# Patient Record
Sex: FEMALE | Race: WHITE | NOT HISPANIC OR LATINO | Employment: OTHER | URBAN - METROPOLITAN AREA
[De-identification: names, ages, dates, MRNs, and addresses within clinical notes are randomized per-mention and may not be internally consistent; named-entity substitution may affect disease eponyms.]

---

## 2017-01-16 ENCOUNTER — APPOINTMENT (OUTPATIENT)
Dept: ADMISSIONS | Facility: MEDICAL CENTER | Age: 62
End: 2017-01-16
Payer: COMMERCIAL

## 2017-01-18 ENCOUNTER — APPOINTMENT (OUTPATIENT)
Dept: ADMISSIONS | Facility: MEDICAL CENTER | Age: 62
End: 2017-01-18
Attending: OPHTHALMOLOGY
Payer: COMMERCIAL

## 2017-01-19 ENCOUNTER — HOSPITAL ENCOUNTER (OUTPATIENT)
Dept: RADIOLOGY | Facility: MEDICAL CENTER | Age: 62
End: 2017-01-19
Attending: NURSE PRACTITIONER
Payer: COMMERCIAL

## 2017-01-19 DIAGNOSIS — Z13.9 SCREENING: ICD-10-CM

## 2017-01-19 PROCEDURE — G0202 SCR MAMMO BI INCL CAD: HCPCS

## 2017-01-24 ENCOUNTER — HOSPITAL ENCOUNTER (OUTPATIENT)
Facility: MEDICAL CENTER | Age: 62
End: 2017-01-24
Attending: OPHTHALMOLOGY | Admitting: OPHTHALMOLOGY
Payer: COMMERCIAL

## 2017-01-24 VITALS
BODY MASS INDEX: 36.21 KG/M2 | OXYGEN SATURATION: 93 % | RESPIRATION RATE: 16 BRPM | SYSTOLIC BLOOD PRESSURE: 137 MMHG | WEIGHT: 204.37 LBS | TEMPERATURE: 97.1 F | HEIGHT: 63 IN | DIASTOLIC BLOOD PRESSURE: 72 MMHG

## 2017-01-24 PROBLEM — H25.12 NUCLEAR SCLEROTIC CATARACT OF LEFT EYE: Status: ACTIVE | Noted: 2017-01-24

## 2017-01-24 PROCEDURE — 700101 HCHG RX REV CODE 250

## 2017-01-24 PROCEDURE — 160002 HCHG RECOVERY MINUTES (STAT): Performed by: OPHTHALMOLOGY

## 2017-01-24 PROCEDURE — 501749 HCHG SHELL REV 276: Performed by: OPHTHALMOLOGY

## 2017-01-24 PROCEDURE — 160029 HCHG SURGERY MINUTES - 1ST 30 MINS LEVEL 4: Performed by: OPHTHALMOLOGY

## 2017-01-24 PROCEDURE — 700111 HCHG RX REV CODE 636 W/ 250 OVERRIDE (IP): Performed by: ANESTHESIOLOGY

## 2017-01-24 PROCEDURE — 700111 HCHG RX REV CODE 636 W/ 250 OVERRIDE (IP)

## 2017-01-24 PROCEDURE — 160009 HCHG ANES TIME/MIN: Performed by: OPHTHALMOLOGY

## 2017-01-24 PROCEDURE — 501539 HCHG TIP, PHACO: Performed by: OPHTHALMOLOGY

## 2017-01-24 PROCEDURE — 160048 HCHG OR STATISTICAL LEVEL 1-5: Performed by: OPHTHALMOLOGY

## 2017-01-24 PROCEDURE — 160035 HCHG PACU - 1ST 60 MINS PHASE I: Performed by: OPHTHALMOLOGY

## 2017-01-24 DEVICE — IMPLANTABLE DEVICE: Type: IMPLANTABLE DEVICE | Status: FUNCTIONAL

## 2017-01-24 RX ORDER — KETOROLAC TROMETHAMINE 5 MG/ML
SOLUTION OPHTHALMIC
Status: DISCONTINUED | OUTPATIENT
Start: 2017-01-24 | End: 2017-01-24 | Stop reason: HOSPADM

## 2017-01-24 RX ORDER — PHENYLEPHRINE HYDROCHLORIDE 25 MG/ML
SOLUTION/ DROPS OPHTHALMIC
Status: COMPLETED
Start: 2017-01-24 | End: 2017-01-24

## 2017-01-24 RX ORDER — SODIUM CHLORIDE, SODIUM LACTATE, POTASSIUM CHLORIDE, CALCIUM CHLORIDE 600; 310; 30; 20 MG/100ML; MG/100ML; MG/100ML; MG/100ML
INJECTION, SOLUTION INTRAVENOUS CONTINUOUS
Status: DISCONTINUED | OUTPATIENT
Start: 2017-01-24 | End: 2017-01-24 | Stop reason: HOSPADM

## 2017-01-24 RX ORDER — TETRACAINE HYDROCHLORIDE 5 MG/ML
SOLUTION OPHTHALMIC
Status: DISCONTINUED | OUTPATIENT
Start: 2017-01-24 | End: 2017-01-24 | Stop reason: HOSPADM

## 2017-01-24 RX ORDER — CYCLOPENTOLATE HYDROCHLORIDE 10 MG/ML
SOLUTION/ DROPS OPHTHALMIC
Status: COMPLETED
Start: 2017-01-24 | End: 2017-01-24

## 2017-01-24 RX ORDER — MOXIFLOXACIN 5 MG/ML
SOLUTION/ DROPS OPHTHALMIC
Status: DISCONTINUED
Start: 2017-01-24 | End: 2017-01-24 | Stop reason: HOSPADM

## 2017-01-24 RX ORDER — MOXIFLOXACIN 5 MG/ML
SOLUTION/ DROPS OPHTHALMIC
Status: DISCONTINUED | OUTPATIENT
Start: 2017-01-24 | End: 2017-01-24 | Stop reason: HOSPADM

## 2017-01-24 RX ORDER — TETRACAINE HYDROCHLORIDE 5 MG/ML
SOLUTION OPHTHALMIC
Status: COMPLETED
Start: 2017-01-24 | End: 2017-01-24

## 2017-01-24 RX ORDER — LIDOCAINE HYDROCHLORIDE 10 MG/ML
INJECTION, SOLUTION EPIDURAL; INFILTRATION; INTRACAUDAL; PERINEURAL
Status: DISCONTINUED | OUTPATIENT
Start: 2017-01-24 | End: 2017-01-24 | Stop reason: HOSPADM

## 2017-01-24 RX ADMIN — TETRACAINE HYDROCHLORIDE 1 DROP: 5 SOLUTION OPHTHALMIC at 06:49

## 2017-01-24 RX ADMIN — CYCLOPENTOLATE HYDROCHLORIDE 1 DROP: 10 SOLUTION/ DROPS OPHTHALMIC at 07:00

## 2017-01-24 RX ADMIN — TETRACAINE HYDROCHLORIDE 1 DROP: 5 SOLUTION OPHTHALMIC at 06:55

## 2017-01-24 RX ADMIN — CYCLOPENTOLATE HYDROCHLORIDE 1 DROP: 10 SOLUTION/ DROPS OPHTHALMIC at 06:49

## 2017-01-24 RX ADMIN — PHENYLEPHRINE HYDROCHLORIDE 1 DROP: 2.5 SOLUTION/ DROPS OPHTHALMIC at 07:00

## 2017-01-24 RX ADMIN — SODIUM CHLORIDE, POTASSIUM CHLORIDE, SODIUM LACTATE AND CALCIUM CHLORIDE 500 ML: 600; 310; 30; 20 INJECTION, SOLUTION INTRAVENOUS at 06:52

## 2017-01-24 RX ADMIN — TETRACAINE HYDROCHLORIDE 1 DROP: 5 SOLUTION OPHTHALMIC at 07:00

## 2017-01-24 RX ADMIN — PHENYLEPHRINE HYDROCHLORIDE 1 DROP: 2.5 SOLUTION/ DROPS OPHTHALMIC at 06:55

## 2017-01-24 RX ADMIN — PHENYLEPHRINE HYDROCHLORIDE 1 DROP: 2.5 SOLUTION/ DROPS OPHTHALMIC at 06:49

## 2017-01-24 RX ADMIN — CYCLOPENTOLATE HYDROCHLORIDE 1 DROP: 10 SOLUTION/ DROPS OPHTHALMIC at 06:55

## 2017-01-24 ASSESSMENT — PAIN SCALES - GENERAL
PAINLEVEL_OUTOF10: 0
PAINLEVEL_OUTOF10: 0

## 2017-01-24 NOTE — IP AVS SNAPSHOT
1/24/2017          Sona Thakur  [address] [place] [address]    Dear Sona:    [facility] wants to ensure your discharge home is safe and you or your loved ones have had all your questions answered regarding your care after you leave the hospital.    You may receive a telephone call within two days of your discharge.  This call is to make certain you understand your discharge instructions as well as ensure we provided you with the best care possible during your stay with us.     The call will only last approximately 3-5 minutes and will be done by a nurse.    Once again, we want to ensure your discharge home is safe and that you have a clear understanding of any next steps in your care.  If you have any questions or concerns, please do not hesitate to contact us, we are here for you.  Thank you for choosing [facility] for your healthcare needs.    Sincerely,    Shlomo Jean    [facility]

## 2017-01-24 NOTE — IP AVS SNAPSHOT
" After Visit Summary                                                                                                                Name:Sona Thakur  Medical Record Number:[id number]  CSN: [id number]    YOB: 1955   Age: 61 y.o.  Sex: female  HT:1.6 m (5' 3\") WT: 92.7 kg (204 lb 5.9 oz)          Admit Date: 1/24/2017     Discharge Date:   Today's Date: 1/24/2017  Attending Doctor:  Yuan Sousa M.D.                  Allergies:  Allegra; Lipitor; and Pravastatin              Follow-up Information     1. Follow up with Yuan Sousa M.D. Today.    Specialty:  Ophthalmology    Why:  For wound re-check    Contact information    [address]  [place] [address] [phone]          Discharge Instructions         ACTIVITY: Rest and take it easy for the first 24 hours.  A responsible adult is recommended to remain with you during that time.  It is normal to feel sleepy.  We encourage you to not do anything that requires balance, judgment or coordination.    MILD FLU-LIKE SYMPTOMS ARE NORMAL. YOU MAY EXPERIENCE GENERALIZED MUSCLE ACHES, THROAT IRRITATION, HEADACHE AND/OR SOME NAUSEA.    FOR 24 HOURS DO NOT:  Drive, operate machinery or run household appliances.  Drink beer or alcoholic beverages.   Make important decisions or sign legal documents.    SPECIAL INSTRUCTIONS: PER MD'S INSTRUCTIONS    DIET: To avoid nausea, slowly advance diet as tolerated, avoiding spicy or greasy foods for the first day.  Add more substantial food to your diet according to your physician's instructions.  Babies can be fed formula or breast milk as soon as they are hungry.  INCREASE FLUIDS AND FIBER TO AVOID CONSTIPATION.    SURGICAL DRESSING/BATHING: PER MD'S INSTRUCTIONS    FOLLOW-UP APPOINTMENT:  A follow-up appointment should be arranged with your doctor in TOMORROW; call to schedule.    You should CALL YOUR PHYSICIAN if you develop:  Fever greater than 101 degrees F.  Pain not relieved by medication, or " persistent nausea or vomiting.  Excessive bleeding (blood soaking through dressing) or unexpected drainage from the wound.  Extreme redness or swelling around the incision site, drainage of pus or foul smelling drainage.  Inability to urinate or empty your bladder within 8 hours.  Problems with breathing or chest pain.    You should call 911 if you develop problems with breathing or chest pain.  If you are unable to contact your doctor or surgical center, you should go to the nearest emergency room or urgent care center.  Physician's telephone #: [phone]    If any questions arise, call your doctor.  If your doctor is not available, please feel free to call the Surgical Center at [phone].  The Center is open Monday through Friday from 7AM to 7PM.  You can also call the [facility] HOTLINE open 24 hours/day, 7 days/week and speak to a nurse at [phone], or toll free at [phone].    A registered nurse may call you a few days after your surgery to see how you are doing after your procedure.    MEDICATIONS: Resume taking daily medication.  Take prescribed pain medication with food.  If no medication is prescribed, you may take non-aspirin pain medication if needed.  PAIN MEDICATION CAN BE VERY CONSTIPATING.  Take a stool softener or laxative such as senokot, pericolace, or milk of magnesia if needed.    Prescription given for NA.  Last pain medication given at .    If your physician has prescribed pain medication that includes Acetaminophen (Tylenol), do not take additional Acetaminophen (Tylenol) while taking the prescribed medication.    Depression / Suicide Risk    As you are discharged from this [facility] facility, it is important to learn how to keep safe from harming yourself.    Recognize the warning signs:  · Abrupt changes in personality, positive or negative- including increase in energy   · Giving away possessions  · Change in eating patterns- significant weight changes-  positive or  negative  · Change in sleeping patterns- unable to sleep or sleeping all the time   · Unwillingness or inability to communicate  · Depression  · Unusual sadness, discouragement and loneliness  · Talk of wanting to die  · Neglect of personal appearance   · Rebelliousness- reckless behavior  · Withdrawal from people/activities they love  · Confusion- inability to concentrate     If you or a loved one observes any of these behaviors or has concerns about self-harm, here's what you can do:  · Talk about it- your feelings and reasons for harming yourself  · Remove any means that you might use to hurt yourself (examples: pills, rope, extension cords, firearm)  · Get professional help from the community (Mental Health, Substance Abuse, psychological counseling)  · Do not be alone:Call your Safe Contact- someone whom you trust who will be there for you.  · Call your local CRISIS HOTLINE 949-4834 or 976-358-8059  · Call your local Children's Mobile Crisis Response Team [place] (728) 000-7066 or www.[employer]  · Call the toll free National Suicide Prevention Hotlines   · National Suicide Prevention Lifeline 236-030-IFRT (1606)  · National Hope Line Network 800-SUICIDE (639-0242)       Medication List      ASK your doctor about these medications        Instructions    acetaminophen 325 MG Tabs   Commonly known as:  TYLENOL    Take 2 Tabs by mouth every four hours as needed.   Dose:  650 mg       acetaminophen/caffeine/butalbital 325-40-50 mg -40 MG Tabs   Commonly known as:  FIORICET    Doctor's comments:  Must last 30 days   Take 1 Tab by mouth every four hours as needed for Headache.   Dose:  1 Tab       ADVIL PO    Take  by mouth.       aspirin  MG Tbec   Commonly known as:  ECOTRIN    Take 1 Tab by mouth every day.   Dose:  325 mg       chlorthalidone 25 MG Tabs   Commonly known as:  HYGROTON    Take 1 Tab by mouth every day.   Dose:  25 mg       cholestyramine 4 G packet   Commonly known as:   QUESTRAN    Take 4 g by mouth 2 times a day.   Dose:  1 Packet       fish oil 1000 MG Caps capsule    Take 1 Cap by mouth 3 times a day, with meals.   Dose:  1000 mg       losartan 50 MG Tabs   Commonly known as:  COZAAR    Take 1 Tab by mouth every day.   Dose:  50 mg       omeprazole 20 MG delayed-release capsule   Commonly known as:  PRILOSEC    take 20 mgs AM and 40 mgs PM.       potassium chloride SA 10 MEQ Tbcr   Commonly known as:  K-DUR    Take 1 Tab by mouth in the PM on Mon, Wed, Fri, and Sat.   Dose:  10 mEq       timolol 0.5 % Soln   Commonly known as:  TIMOPTIC    Place 1 Drop in both eyes 2 times a day.   Dose:  1 Drop       tramadol 50 MG Tabs   Commonly known as:  ULTRAM    Take 1 Tab by mouth every 24 hours as needed.   Dose:  50 mg       travoprost 0.004 % Soln   Commonly known as:  TRAVATAN Z    Place 1 Drop in left eye every evening.   Dose:  1 Drop       Vitamin D 2000 UNITS Caps    Take 1 Cap by mouth every day.   Dose:  2000 Units               Medication Information     Above and/or attached are the medications your physician expects you to take upon discharge. Review all of your home medications and newly ordered medications with your doctor and/or pharmacist. Follow medication instructions as directed by your doctor and/or pharmacist. Please keep your medication list with you and share with your physician. Update the information when medications are discontinued, doses are changed, or new medications (including over-the-counter products) are added; and carry medication information at all times in the event of emergency situations.        Resources     Quit Smoking / Tobacco Use:    I understand the use of any tobacco products increases my chance of suffering from future heart disease or stroke and could cause other illnesses which may shorten my life. Quitting the use of tobacco products is the single most important thing I can do to improve my health. For further information on smoking /  tobacco cessation call a Toll Free Quit Line at 1-176.419.8407 (*[employer] [place]) or 1-562.730.4041 (American Lung Association) or you can access the web based program at www.lungusa.org.    [place] Tobacco Users Help Line:  (680) 178-8791       Toll Free: 1-242.483.7441  Quit Tobacco Program [facility] (685)547-9133    Crisis Hotline:    [place] Crisis Hotline:  2-262-PESJRXG or 1-933.994.2517    [place] Crisis Hotline:    1-619.133.4673 or 220-643-9306    Discharge Survey:   Thank you for choosing [facility]. We hope we did everything we could to make your hospital stay a pleasant one. You may be receiving a survey and we would appreciate your time and participation in answering the questions. Your input is very valuable to us in our efforts to improve our service to our patients and their families.            Signatures     My signature on this form indicates that:    1. I acknowledge receipt and understanding of these Home Care Instruction.  2. My questions regarding this information have been answered to my satisfaction.  3. I have formulated a plan with my discharge nurse to obtain my prescribed medications for home.    __________________________________      __________________________________                   Patient Signature                                 Guardian/Responsible Adult Signature      __________________________________                 __________       ________                       Nurse Signature                                               Date                 Time

## 2017-01-24 NOTE — DISCHARGE INSTRUCTIONS
ACTIVITY: Rest and take it easy for the first 24 hours.  A responsible adult is recommended to remain with you during that time.  It is normal to feel sleepy.  We encourage you to not do anything that requires balance, judgment or coordination.    MILD FLU-LIKE SYMPTOMS ARE NORMAL. YOU MAY EXPERIENCE GENERALIZED MUSCLE ACHES, THROAT IRRITATION, HEADACHE AND/OR SOME NAUSEA.    FOR 24 HOURS DO NOT:  Drive, operate machinery or run household appliances.  Drink beer or alcoholic beverages.   Make important decisions or sign legal documents.    SPECIAL INSTRUCTIONS: PER MD'S INSTRUCTIONS    DIET: To avoid nausea, slowly advance diet as tolerated, avoiding spicy or greasy foods for the first day.  Add more substantial food to your diet according to your physician's instructions.  Babies can be fed formula or breast milk as soon as they are hungry.  INCREASE FLUIDS AND FIBER TO AVOID CONSTIPATION.    SURGICAL DRESSING/BATHING: PER MD'S INSTRUCTIONS    FOLLOW-UP APPOINTMENT:  A follow-up appointment should be arranged with your doctor in TOMORROW; call to schedule.    You should CALL YOUR PHYSICIAN if you develop:  Fever greater than 101 degrees F.  Pain not relieved by medication, or persistent nausea or vomiting.  Excessive bleeding (blood soaking through dressing) or unexpected drainage from the wound.  Extreme redness or swelling around the incision site, drainage of pus or foul smelling drainage.  Inability to urinate or empty your bladder within 8 hours.  Problems with breathing or chest pain.    You should call 911 if you develop problems with breathing or chest pain.  If you are unable to contact your doctor or surgical center, you should go to the nearest emergency room or urgent care center.  Physician's telephone #: [phone]    If any questions arise, call your doctor.  If your doctor is not available, please feel free to call the Surgical Center at [phone].  The Center is open Monday through Friday from  7AM to 7PM.  You can also call the HEALTH HOTLINE open 24 hours/day, 7 days/week and speak to a nurse at [phone], or toll free at [phone].    A registered nurse may call you a few days after your surgery to see how you are doing after your procedure.    MEDICATIONS: Resume taking daily medication.  Take prescribed pain medication with food.  If no medication is prescribed, you may take non-aspirin pain medication if needed.  PAIN MEDICATION CAN BE VERY CONSTIPATING.  Take a stool softener or laxative such as senokot, pericolace, or milk of magnesia if needed.    Prescription given for NA.  Last pain medication given at NA.    If your physician has prescribed pain medication that includes Acetaminophen (Tylenol), do not take additional Acetaminophen (Tylenol) while taking the prescribed medication.    Depression / Suicide Risk    As you are discharged from this [facility] facility, it is important to learn how to keep safe from harming yourself.    Recognize the warning signs:  · Abrupt changes in personality, positive or negative- including increase in energy   · Giving away possessions  · Change in eating patterns- significant weight changes-  positive or negative  · Change in sleeping patterns- unable to sleep or sleeping all the time   · Unwillingness or inability to communicate  · Depression  · Unusual sadness, discouragement and loneliness  · Talk of wanting to die  · Neglect of personal appearance   · Rebelliousness- reckless behavior  · Withdrawal from people/activities they love  · Confusion- inability to concentrate     If you or a loved one observes any of these behaviors or has concerns about self-harm, here's what you can do:  · Talk about it- your feelings and reasons for harming yourself  · Remove any means that you might use to hurt yourself (examples: pills, rope, extension cords, firearm)  · Get professional help from the community (Mental Health, Substance Abuse, psychological  counseling)  · Do not be alone:Call your Safe Contact- someone whom you trust who will be there for you.  · Call your local CRISIS HOTLINE 259-8226 or 303-576-2049  · Call your local Children's Mobile Crisis Response Team [place] (666) 683-2090 or www.[employer]  · Call the toll free National Suicide Prevention Hotlines   · National Suicide Prevention Lifeline 286-203-PXIH (0171)  · National Hope Line Network 800-SUICIDE (719-9518)

## 2017-01-24 NOTE — OR NURSING
Patient properly identified in pre op. Patient allergies and NPO status verified, home medication reconciliation completed and belongings secured. Patient verbalizes understanding of pain scale, expected course of stay and plan of care. Surgical site verified with patient. IV access established.

## 2017-01-24 NOTE — OP REPORT
DATE OF SERVICE:  01/24/2017    PREOPERATIVE DIAGNOSIS:  Cataract, left eye.    POSTOPERATIVE DIAGNOSIS:  Cataract, left eye.    PROCEDURE:  Phacoemulsification with intraocular lens implant, left eye.    SURGEON:  Yuan Sousa MD    ANESTHESIA:  Local MAC.    PROSTHETIC DEVICES:  Softec HD intraocular lens, 14.0 diopter, serial   #[id number].    INDICATIONS:  The patient has a visually significant cataract in the left eye.    DESCRIPTION OF OPERATION:  The patient was placed in the supine position on   the operating room table.  Appropriate anesthetic monitors were positioned.    The eye was prepped and draped in the usual sterile fashion for ocular surgery   using Betadine solution.  A wire lid speculum was positioned for exposure.  A   clear cornea temporal incision was made with a robby keratome and a   paracentesis was made with a robby knife.  The anterior chamber was filled   with viscoelastic and a continuous curvilinear capsulorrhexis was made with   the capsulorrhexis forceps.  The lens was hydrodissected and the nucleus was   removed using the phacoemulsification handpiece and the cortex was aspirated   with the IA handpiece.  The capsular bag was filled with viscoelastic and the   intraocular lens was positioned into the capsular bag.  Residual viscoelastic   was aspirated from the anterior chamber, and the wound was hydrated with   saline solution producing a watertight closure.  The wire lid speculum was   removed and the patient was taken to the recovery room in stable condition.       ____________________________________     MD BIBI MOSLEY / NTS    DD:  01/24/2017 08:07:59  DT:  01/24/2017 08:19:09    D#:  [id number]  Job#:  [id number]

## 2017-02-14 ENCOUNTER — HOSPITAL ENCOUNTER (OUTPATIENT)
Facility: MEDICAL CENTER | Age: 62
End: 2017-02-14
Attending: OPHTHALMOLOGY | Admitting: OPHTHALMOLOGY
Payer: COMMERCIAL

## 2017-02-14 VITALS
HEIGHT: 63 IN | HEART RATE: 68 BPM | DIASTOLIC BLOOD PRESSURE: 66 MMHG | OXYGEN SATURATION: 91 % | SYSTOLIC BLOOD PRESSURE: 111 MMHG | WEIGHT: 203.48 LBS | TEMPERATURE: 97 F | BODY MASS INDEX: 36.05 KG/M2 | RESPIRATION RATE: 18 BRPM

## 2017-02-14 PROCEDURE — 501539 HCHG TIP, PHACO: Performed by: OPHTHALMOLOGY

## 2017-02-14 PROCEDURE — 700101 HCHG RX REV CODE 250

## 2017-02-14 PROCEDURE — 700111 HCHG RX REV CODE 636 W/ 250 OVERRIDE (IP): Performed by: ANESTHESIOLOGY

## 2017-02-14 PROCEDURE — 99153 MOD SED SAME PHYS/QHP EA: CPT | Performed by: OPHTHALMOLOGY

## 2017-02-14 PROCEDURE — 160002 HCHG RECOVERY MINUTES (STAT): Performed by: OPHTHALMOLOGY

## 2017-02-14 PROCEDURE — 160029 HCHG SURGERY MINUTES - 1ST 30 MINS LEVEL 4: Performed by: OPHTHALMOLOGY

## 2017-02-14 PROCEDURE — 99152 MOD SED SAME PHYS/QHP 5/>YRS: CPT | Performed by: OPHTHALMOLOGY

## 2017-02-14 PROCEDURE — 160048 HCHG OR STATISTICAL LEVEL 1-5: Performed by: OPHTHALMOLOGY

## 2017-02-14 PROCEDURE — 160035 HCHG PACU - 1ST 60 MINS PHASE I: Performed by: OPHTHALMOLOGY

## 2017-02-14 PROCEDURE — 700111 HCHG RX REV CODE 636 W/ 250 OVERRIDE (IP)

## 2017-02-14 PROCEDURE — 501749 HCHG SHELL REV 276: Performed by: OPHTHALMOLOGY

## 2017-02-14 DEVICE — IMPLANTABLE DEVICE: Type: IMPLANTABLE DEVICE | Status: FUNCTIONAL

## 2017-02-14 RX ORDER — SODIUM CHLORIDE, SODIUM LACTATE, POTASSIUM CHLORIDE, CALCIUM CHLORIDE 600; 310; 30; 20 MG/100ML; MG/100ML; MG/100ML; MG/100ML
INJECTION, SOLUTION INTRAVENOUS CONTINUOUS
Status: DISCONTINUED | OUTPATIENT
Start: 2017-02-14 | End: 2017-02-14 | Stop reason: HOSPADM

## 2017-02-14 RX ORDER — MOXIFLOXACIN 5 MG/ML
SOLUTION/ DROPS OPHTHALMIC
Status: DISCONTINUED | OUTPATIENT
Start: 2017-02-14 | End: 2017-02-14 | Stop reason: HOSPADM

## 2017-02-14 RX ORDER — TETRACAINE HYDROCHLORIDE 5 MG/ML
SOLUTION OPHTHALMIC
Status: COMPLETED
Start: 2017-02-14 | End: 2017-02-14

## 2017-02-14 RX ORDER — TETRACAINE HYDROCHLORIDE 5 MG/ML
SOLUTION OPHTHALMIC
Status: DISCONTINUED | OUTPATIENT
Start: 2017-02-14 | End: 2017-02-14 | Stop reason: HOSPADM

## 2017-02-14 RX ORDER — KETOROLAC TROMETHAMINE 5 MG/ML
SOLUTION OPHTHALMIC
Status: DISCONTINUED | OUTPATIENT
Start: 2017-02-14 | End: 2017-02-14 | Stop reason: HOSPADM

## 2017-02-14 RX ORDER — CYCLOPENTOLATE HYDROCHLORIDE 10 MG/ML
SOLUTION/ DROPS OPHTHALMIC
Status: COMPLETED
Start: 2017-02-14 | End: 2017-02-14

## 2017-02-14 RX ORDER — PHENYLEPHRINE HYDROCHLORIDE 25 MG/ML
SOLUTION/ DROPS OPHTHALMIC
Status: COMPLETED
Start: 2017-02-14 | End: 2017-02-14

## 2017-02-14 RX ADMIN — CYCLOPENTOLATE HYDROCHLORIDE 1 DROP: 10 SOLUTION/ DROPS OPHTHALMIC at 07:09

## 2017-02-14 RX ADMIN — PHENYLEPHRINE HYDROCHLORIDE 1 DROP: 2.5 SOLUTION/ DROPS OPHTHALMIC at 07:14

## 2017-02-14 RX ADMIN — PHENYLEPHRINE HYDROCHLORIDE 1 DROP: 2.5 SOLUTION/ DROPS OPHTHALMIC at 07:19

## 2017-02-14 RX ADMIN — CYCLOPENTOLATE HYDROCHLORIDE 1 DROP: 10 SOLUTION/ DROPS OPHTHALMIC at 07:19

## 2017-02-14 RX ADMIN — TETRACAINE HYDROCHLORIDE 1 DROP: 5 SOLUTION OPHTHALMIC at 07:09

## 2017-02-14 RX ADMIN — CYCLOPENTOLATE HYDROCHLORIDE 1 DROP: 10 SOLUTION/ DROPS OPHTHALMIC at 07:14

## 2017-02-14 RX ADMIN — TETRACAINE HYDROCHLORIDE 1 DROP: 5 SOLUTION OPHTHALMIC at 07:14

## 2017-02-14 RX ADMIN — TETRACAINE HYDROCHLORIDE 1 DROP: 5 SOLUTION OPHTHALMIC at 07:19

## 2017-02-14 RX ADMIN — PHENYLEPHRINE HYDROCHLORIDE 1 DROP: 2.5 SOLUTION/ DROPS OPHTHALMIC at 07:09

## 2017-02-14 ASSESSMENT — PAIN SCALES - GENERAL
PAINLEVEL_OUTOF10: 0

## 2017-02-14 NOTE — IP AVS SNAPSHOT
2/14/2017          Sona Thakur  [address] [place] [address]    Dear Sona:    [facility] wants to ensure your discharge home is safe and you or your loved ones have had all your questions answered regarding your care after you leave the hospital.    You may receive a telephone call within two days of your discharge.  This call is to make certain you understand your discharge instructions as well as ensure we provided you with the best care possible during your stay with us.     The call will only last approximately 3-5 minutes and will be done by a nurse.    Once again, we want to ensure your discharge home is safe and that you have a clear understanding of any next steps in your care.  If you have any questions or concerns, please do not hesitate to contact us, we are here for you.  Thank you for choosing [facility] for your healthcare needs.    Sincerely,    Shlomo Jean    [facility] [place] [facility]

## 2017-02-14 NOTE — IP AVS SNAPSHOT
" Home Care Instructions                                                                                                                Name:Sona Thakur  Medical Record Number:[id number]  CSN: [id number]    YOB: 1955   Age: 61 y.o.  Sex: female  HT:1.6 m (5' 3\") WT: 92.3 kg (203 lb 7.8 oz)          Admit Date: 2/14/2017     Discharge Date:   Today's Date: 2/14/2017  Attending Doctor:  Yuan Sousa M.D.                  Allergies:  Allegra; Lipitor; and Pravastatin              Follow-up Information     1. Follow up with Yuan Sousa M.D. In 1 day.    Specialty:  Ophthalmology    Why:  For wound re-check    Contact information    [address] [place] [address]  [phone]          Discharge Instructions         ACTIVITY: Rest and take it easy for the first 24 hours.  A responsible adult is recommended to remain with you during that time.  It is normal to feel sleepy.  We encourage you to not do anything that requires balance, judgment or coordination.    MILD FLU-LIKE SYMPTOMS ARE NORMAL. YOU MAY EXPERIENCE GENERALIZED MUSCLE ACHES, THROAT IRRITATION, HEADACHE AND/OR SOME NAUSEA.    FOR 24 HOURS DO NOT:  Drive, operate machinery or run household appliances.  Drink beer or alcoholic beverages.   Make important decisions or sign legal documents.    SPECIAL INSTRUCTIONS: PER MD'S INSTRUCTIONS    DIET: To avoid nausea, slowly advance diet as tolerated, avoiding spicy or greasy foods for the first day.  Add more substantial food to your diet according to your physician's instructions.  Babies can be fed formula or breast milk as soon as they are hungry.  INCREASE FLUIDS AND FIBER TO AVOID CONSTIPATION.    SURGICAL DRESSING/BATHING: PER MD'S INSTRUCTIONS    FOLLOW-UP APPOINTMENT:  A follow-up appointment should be arranged with your doctor in TOMORROW; call to schedule.    You should CALL YOUR PHYSICIAN if you develop:  Fever greater than 101 degrees F.  Pain not relieved by medication, or " persistent nausea or vomiting.  Excessive bleeding (blood soaking through dressing) or unexpected drainage from the wound.  Extreme redness or swelling around the incision site, drainage of pus or foul smelling drainage.  Inability to urinate or empty your bladder within 8 hours.  Problems with breathing or chest pain.    You should call 911 if you develop problems with breathing or chest pain.  If you are unable to contact your doctor or surgical center, you should go to the nearest emergency room or urgent care center.  Physician's telephone #: [phone]    If any questions arise, call your doctor.  If your doctor is not available, please feel free to call the Surgical Center at [phone].  The Center is open Monday through Friday from 7AM to 7PM.  You can also call the [facility] HOTLINE open 24 hours/day, 7 days/week and speak to a nurse at [phone], or toll free at [phone].    A registered nurse may call you a few days after your surgery to see how you are doing after your procedure.    MEDICATIONS: Resume taking daily medication.  Take prescribed pain medication with food.  If no medication is prescribed, you may take non-aspirin pain medication if needed.  PAIN MEDICATION CAN BE VERY CONSTIPATING.  Take a stool softener or laxative such as senokot, pericolace, or milk of magnesia if needed.    Prescription given for NA.  Last pain medication given at .    If your physician has prescribed pain medication that includes Acetaminophen (Tylenol), do not take additional Acetaminophen (Tylenol) while taking the prescribed medication.    Depression / Suicide Risk    As you are discharged from this [facility] facility, it is important to learn how to keep safe from harming yourself.    Recognize the warning signs:  · Abrupt changes in personality, positive or negative- including increase in energy   · Giving away possessions  · Change in eating patterns- significant weight changes-  positive or  negative  · Change in sleeping patterns- unable to sleep or sleeping all the time   · Unwillingness or inability to communicate  · Depression  · Unusual sadness, discouragement and loneliness  · Talk of wanting to die  · Neglect of personal appearance   · Rebelliousness- reckless behavior  · Withdrawal from people/activities they love  · Confusion- inability to concentrate     If you or a loved one observes any of these behaviors or has concerns about self-harm, here's what you can do:  · Talk about it- your feelings and reasons for harming yourself  · Remove any means that you might use to hurt yourself (examples: pills, rope, extension cords, firearm)  · Get professional help from the community (Mental Health, Substance Abuse, psychological counseling)  · Do not be alone:Call your Safe Contact- someone whom you trust who will be there for you.  · Call your local CRISIS HOTLINE 691-2230 or 073-336-8195  · Call your local Children's Mobile Crisis Response Team [place] (663) 098-6136 or www.[employer]  · Call the toll free National Suicide Prevention Hotlines   · National Suicide Prevention Lifeline 578-382-AJHU (3250)  · National Hope Line Network 800-SUICIDE (863-5927)       Medication List      ASK your doctor about these medications        Instructions    acetaminophen 325 MG Tabs   Commonly known as:  TYLENOL    Take 2 Tabs by mouth every four hours as needed.   Dose:  650 mg       acetaminophen/caffeine/butalbital 325-40-50 mg -40 MG Tabs   Commonly known as:  FIORICET    Doctor's comments:  Must last 30 days   Take 1 Tab by mouth every four hours as needed for Headache.   Dose:  1 Tab       ADVIL PO    Take  by mouth.       aspirin  MG Tbec   Commonly known as:  ECOTRIN    Take 1 Tab by mouth every day.   Dose:  325 mg       chlorthalidone 25 MG Tabs   Commonly known as:  HYGROTON    Take 1 Tab by mouth every day.   Dose:  25 mg       cholestyramine 4 G packet   Commonly known as:   QUESTRAN    Take 4 g by mouth 2 times a day.   Dose:  1 Packet       fish oil 1000 MG Caps capsule    Take 1 Cap by mouth 3 times a day, with meals.   Dose:  1000 mg       losartan 50 MG Tabs   Commonly known as:  COZAAR    Take 1 Tab by mouth every day.   Dose:  50 mg       omeprazole 20 MG delayed-release capsule   Commonly known as:  PRILOSEC    take 20 mgs AM and 40 mgs PM.       potassium chloride SA 10 MEQ Tbcr   Commonly known as:  K-DUR    Take 1 Tab by mouth in the PM on Mon, Wed, Fri, and Sat.   Dose:  10 mEq       timolol 0.5 % Soln   Commonly known as:  TIMOPTIC    Place 1 Drop in both eyes 2 times a day.   Dose:  1 Drop       tramadol 50 MG Tabs   Commonly known as:  ULTRAM    Take 1 Tab by mouth every 24 hours as needed.   Dose:  50 mg       travoprost 0.004 % Soln   Commonly known as:  TRAVATAN Z    Place 1 Drop in left eye every evening.   Dose:  1 Drop       Vitamin D 2000 UNITS Caps    Take 1 Cap by mouth every day.   Dose:  2000 Units               Medication Information     Above and/or attached are the medications your physician expects you to take upon discharge. Review all of your home medications and newly ordered medications with your doctor and/or pharmacist. Follow medication instructions as directed by your doctor and/or pharmacist. Please keep your medication list with you and share with your physician. Update the information when medications are discontinued, doses are changed, or new medications (including over-the-counter products) are added; and carry medication information at all times in the event of emergency situations.        Resources     Quit Smoking / Tobacco Use:    I understand the use of any tobacco products increases my chance of suffering from future heart disease or stroke and could cause other illnesses which may shorten my life. Quitting the use of tobacco products is the single most important thing I can do to improve my health. For further information on smoking /  tobacco cessation call a Toll Free Quit Line at 1-727.145.2333 (*[employer] [place]) or 1-696.597.6110 (American Lung Association) or you can access the web based program at www.lungusa.org.    [place] Tobacco Users Help Line:  (434) 445-7846       Toll Free: 1-285.789.8972  Quit Tobacco Program [facility] (129)[phone]    Crisis Hotline:    [place] Crisis Hotline:  6-285-JPWZLEX or 1-372.271.2448    [place] Crisis Hotline:    1-408.482.5809 or 307-032-0142    Discharge Survey:   Thank you for choosing [facility]. We hope we did everything we could to make your hospital stay a pleasant one. You may be receiving a survey and we would appreciate your time and participation in answering the questions. Your input is very valuable to us in our efforts to improve our service to our patients and their families.            Signatures     My signature on this form indicates that:    1. I acknowledge receipt and understanding of these Home Care Instruction.  2. My questions regarding this information have been answered to my satisfaction.  3. I have formulated a plan with my discharge nurse to obtain my prescribed medications for home.    __________________________________      __________________________________                   Patient Signature                                 Guardian/Responsible Adult Signature      __________________________________                 __________       ________                       Nurse Signature                                               Date                 Time

## 2017-02-14 NOTE — OP REPORT
DATE OF SERVICE:  02/14/2017    PREOPERATIVE DIAGNOSIS:  Cataract, right eye.    POSTOPERATIVE DIAGNOSIS:  Cataract, right eye.    PROCEDURE PERFORMED:  Phacoemulsification with intraocular lens implant, right   eye.    SURGEON:  Yuan Sousa MD    ANESTHESIA:  Local MAC.    PROSTHETIC DEVICES:  Softec HD intraocular lens, 15.25 diopter, serial   #[id number].    INDICATIONS:  The patient has a visually significant cataract in the right   eye.    DESCRIPTION OF OPERATION:  The patient was placed in the supine position on   the operating room table.  Appropriate anesthetic monitors were positioned.    The eye was prepped and draped in the usual sterile fashion for ocular surgery   using Betadine solution.  A wire lid speculum was positioned for exposure.  A   clear cornea temporal incision was made with a robby keratome and a   paracentesis was made with a robby knife.  The anterior chamber was filled   with viscoelastic and a continuous curvilinear capsulorrhexis was made with   the capsulorrhexis forceps.  The lens was hydrodissected and the nucleus was   removed using the phacoemulsification handpiece and the cortex was aspirated   with the IA handpiece.  The capsular bag was filled with viscoelastic and the   intraocular lens was positioned into the capsular bag.  Residual viscoelastic   was aspirated from the anterior chamber, and the wound was hydrated with   saline solution producing a watertight closure.  The wire lid speculum was   removed and the patient was taken to the recovery room in stable condition.       ____________________________________     MD BIBI MOSLEY / NTS    DD:  02/14/2017 08:42:28  DT:  02/14/2017 09:32:24    D#:  [id number]  Job#:  [id number]

## 2017-02-14 NOTE — DISCHARGE INSTRUCTIONS
ACTIVITY: Rest and take it easy for the first 24 hours.  A responsible adult is recommended to remain with you during that time.  It is normal to feel sleepy.  We encourage you to not do anything that requires balance, judgment or coordination.    MILD FLU-LIKE SYMPTOMS ARE NORMAL. YOU MAY EXPERIENCE GENERALIZED MUSCLE ACHES, THROAT IRRITATION, HEADACHE AND/OR SOME NAUSEA.    FOR 24 HOURS DO NOT:  Drive, operate machinery or run household appliances.  Drink beer or alcoholic beverages.   Make important decisions or sign legal documents.    SPECIAL INSTRUCTIONS: PER MD'S INSTRUCTIONS    DIET: To avoid nausea, slowly advance diet as tolerated, avoiding spicy or greasy foods for the first day.  Add more substantial food to your diet according to your physician's instructions.  Babies can be fed formula or breast milk as soon as they are hungry.  INCREASE FLUIDS AND FIBER TO AVOID CONSTIPATION.    SURGICAL DRESSING/BATHING: PER MD'S INSTRUCTIONS    FOLLOW-UP APPOINTMENT:  A follow-up appointment should be arranged with your doctor in TOMORROW; call to schedule.    You should CALL YOUR PHYSICIAN if you develop:  Fever greater than 101 degrees F.  Pain not relieved by medication, or persistent nausea or vomiting.  Excessive bleeding (blood soaking through dressing) or unexpected drainage from the wound.  Extreme redness or swelling around the incision site, drainage of pus or foul smelling drainage.  Inability to urinate or empty your bladder within 8 hours.  Problems with breathing or chest pain.    You should call 911 if you develop problems with breathing or chest pain.  If you are unable to contact your doctor or surgical center, you should go to the nearest emergency room or urgent care center.  Physician's telephone #: [phone]    If any questions arise, call your doctor.  If your doctor is not available, please feel free to call the Surgical Center at [phone].  The Center is open Monday through Friday from  7AM to 7PM.  You can also call the HEALTH HOTLINE open 24 hours/day, 7 days/week and speak to a nurse at [phone], or toll free at [phone].    A registered nurse may call you a few days after your surgery to see how you are doing after your procedure.    MEDICATIONS: Resume taking daily medication.  Take prescribed pain medication with food.  If no medication is prescribed, you may take non-aspirin pain medication if needed.  PAIN MEDICATION CAN BE VERY CONSTIPATING.  Take a stool softener or laxative such as senokot, pericolace, or milk of magnesia if needed.    Prescription given for NA.  Last pain medication given at NA.    If your physician has prescribed pain medication that includes Acetaminophen (Tylenol), do not take additional Acetaminophen (Tylenol) while taking the prescribed medication.    Depression / Suicide Risk    As you are discharged from this [facility] facility, it is important to learn how to keep safe from harming yourself.    Recognize the warning signs:  · Abrupt changes in personality, positive or negative- including increase in energy   · Giving away possessions  · Change in eating patterns- significant weight changes-  positive or negative  · Change in sleeping patterns- unable to sleep or sleeping all the time   · Unwillingness or inability to communicate  · Depression  · Unusual sadness, discouragement and loneliness  · Talk of wanting to die  · Neglect of personal appearance   · Rebelliousness- reckless behavior  · Withdrawal from people/activities they love  · Confusion- inability to concentrate     If you or a loved one observes any of these behaviors or has concerns about self-harm, here's what you can do:  · Talk about it- your feelings and reasons for harming yourself  · Remove any means that you might use to hurt yourself (examples: pills, rope, extension cords, firearm)  · Get professional help from the community (Mental Health, Substance Abuse, psychological  counseling)  · Do not be alone:Call your Safe Contact- someone whom you trust who will be there for you.  · Call your local CRISIS HOTLINE 369-1924 or 004-724-9098  · Call your local Children's Mobile Crisis Response Team [place] (341) 815-5768 or www.[employer]  · Call the toll free National Suicide Prevention Hotlines   · National Suicide Prevention Lifeline 761-981-ACVO (9419)  · National Hope Line Network 800-SUICIDE (179-0345)

## 2017-04-07 ENCOUNTER — OFFICE VISIT (OUTPATIENT)
Dept: URGENT CARE | Facility: CLINIC | Age: 62
End: 2017-04-07
Payer: COMMERCIAL

## 2017-04-07 VITALS
HEART RATE: 66 BPM | RESPIRATION RATE: 16 BRPM | TEMPERATURE: 97.3 F | DIASTOLIC BLOOD PRESSURE: 70 MMHG | BODY MASS INDEX: 36.5 KG/M2 | WEIGHT: 206 LBS | OXYGEN SATURATION: 95 % | SYSTOLIC BLOOD PRESSURE: 104 MMHG

## 2017-04-07 DIAGNOSIS — J40 BRONCHITIS: ICD-10-CM

## 2017-04-07 DIAGNOSIS — J06.9 ACUTE URI: ICD-10-CM

## 2017-04-07 PROCEDURE — 99214 OFFICE O/P EST MOD 30 MIN: CPT | Performed by: PHYSICIAN ASSISTANT

## 2017-04-07 RX ORDER — AZITHROMYCIN 250 MG/1
TABLET, FILM COATED ORAL
Qty: 6 TAB | Refills: 0 | Status: SHIPPED | OUTPATIENT
Start: 2017-04-07 | End: 2017-05-15

## 2017-04-07 RX ORDER — PREDNISONE 20 MG/1
40 TABLET ORAL EVERY MORNING
Qty: 10 TAB | Refills: 0 | Status: SHIPPED | OUTPATIENT
Start: 2017-04-07 | End: 2017-05-15

## 2017-04-07 ASSESSMENT — ENCOUNTER SYMPTOMS
NAUSEA: 0
DIARRHEA: 0
MYALGIAS: 1
SORE THROAT: 1
DIZZINESS: 0
ABDOMINAL PAIN: 0
COUGH: 1
SHORTNESS OF BREATH: 0
HEADACHES: 1
FEVER: 1
CHILLS: 0

## 2017-04-07 NOTE — MR AVS SNAPSHOT
"        Sona Thakur   2017 3:25 PM   Office Visit   MRN: [id number]    Department:  [facility]   Dept Phone:  [phone]    Description:  Female : 1955   Provider:  Chyna Valdovinos PA-C           Reason for Visit     Sinus Problem x 1 wk, nasal congestion, runny nose, face pressure, post nasal drip and throat feels sore     Cough x 1 wk, some productive cough and chest feels heavy      Allergies as of 2017     Allergen Noted Reactions    Allegra 2010       \"Gave me a bad headache    Lipitor [Atorvastatin Calcium] 03/15/2013       Pravastatin 2016       Back, legs and hip pain      You were diagnosed with     Bronchitis   [784785]       Acute URI   [144735]         Vital Signs     Blood Pressure Pulse Temperature Respirations Weight Oxygen Saturation    104/70 mmHg 66 36.3 °C (97.3 °F) 16 93.441 kg (206 lb) 95%    Smoking Status                   Former Smoker           Basic Information     Date Of Birth Sex Race Ethnicity Preferred Language    1955 Female White Non- English      Your appointments     2017  7:30 AM   Established Patient with JOSS Pedersen   [facility] [place] [facility]    [address]  [facility] [id number] [phone]           You will be receiving a confirmation call a few days before your appointment from our automated call confirmation system.              Problem List              ICD-10-CM Priority Class Noted - Resolved    Preventative health care Z00.00   2010 - Present    GERD (gastroesophageal reflux disease) K21.9   2012 - Present    Allergic rhinitis    2012 - Present    Hypertension I10   2012 - Present    Atrial fibrillation (CMS-[facility]) I48.91   2/15/2013 - Present    Glaucoma H40.9   Unknown - Present    Cataract H26.9   Unknown - Present    Nuclear sclerotic cataract of left eye H25.12   2017 - Present    Nuclear cataract H25.10   2017 - " Present      Health Maintenance        Date Due Completion Dates    MAMMOGRAM 1/19/2018 1/19/2017, 12/30/2015, 11/17/2014, 11/15/2013, 11/14/2012, 11/7/2011, 11/2/2010, 10/28/2009, 10/28/2009, 10/27/2008, 10/27/2008, 10/23/2007, 10/23/2007, 10/5/2006, 10/4/2005, 9/15/2004    IMM DTaP/Tdap/Td Vaccine (2 - Td) 5/23/2023 5/23/2013    COLONOSCOPY 3/29/2026 3/29/2016 (Done), 10/1/2005 (Done)    Override on 3/29/2016: Done    Override on 10/1/2005: Done            Current Immunizations     Influenza TIV (IM) 10/21/2014, 10/30/2013    Influenza Vaccine Adult HD 10/5/2016, 10/19/2015    SHINGLES VACCINE 6/26/2015  4:49 PM    Tdap Vaccine 5/23/2013      Below and/or attached are the medications your provider expects you to take. Review all of your home medications and newly ordered medications with your provider and/or pharmacist. Follow medication instructions as directed by your provider and/or pharmacist. Please keep your medication list with you and share with your provider. Update the information when medications are discontinued, doses are changed, or new medications (including over-the-counter products) are added; and carry medication information at all times in the event of emergency situations     Allergies:  ALLEGRA - (reactions not documented)     LIPITOR - (reactions not documented)     PRAVASTATIN - (reactions not documented)               Medications  Valid as of: April 07, 2017 -  4:08 PM    Generic Name Brand Name Tablet Size Instructions for use    Acetaminophen (Tab) TYLENOL 325 MG Take 2 Tabs by mouth every four hours as needed.        Aspirin (Tablet Delayed Response) ECOTRIN 325 MG Take 1 Tab by mouth every day.        Azithromycin (Tab) ZITHROMAX 250 MG 2 tabs day number one then one tab daily for remaining 4 days        Butalbital-APAP-Caffeine (Tab) FIORICET -40 MG Take 1 Tab by mouth every four hours as needed for Headache.        Chlorthalidone (Tab) HYGROTON 25 MG Take 1 Tab by mouth every  day.        Cholecalciferol (Cap) Vitamin D 2000 UNITS Take 1 Cap by mouth every day.        Cholestyramine (Pack) QUESTRAN 4 G Take 4 g by mouth 2 times a day.        Ibuprofen   Take  by mouth.        Losartan Potassium (Tab) COZAAR 50 MG Take 1 Tab by mouth every day.        Omega-3 Fatty Acids (Cap) fish oil 1000 MG Take 1 Cap by mouth 3 times a day, with meals.        Omeprazole (CAPSULE DELAYED RELEASE) PRILOSEC 20 MG take 20 mgs AM and 40 mgs PM.        Potassium Chloride Ellen CR (Tab CR) K-DUR 10 MEQ Take 1 Tab by mouth in the PM on Mon, Wed, Fri, and Sat.        PredniSONE (Tab) DELTASONE 20 MG Take 2 Tabs by mouth every morning.        Timolol Maleate (Solution) TIMOPTIC 0.5 % Place 1 Drop in both eyes 2 times a day.        TraMADol HCl (Tab) ULTRAM 50 MG Take 1 Tab by mouth every 24 hours as needed.        Travoprost (Solution) TRAVATAN Z 0.004 % Place 1 Drop in left eye every evening.        .                 Medicines prescribed today were sent to:     [facility] [address] [place] [address]), [place] - [address] () [place] [address]    Phone: [phone] Fax: [phone]    Open 24 Hours?: No      Medication refill instructions:       If your prescription bottle indicates you have medication refills left, it is not necessary to call your provider’s office. Please contact your pharmacy and they will refill your medication.    If your prescription bottle indicates you do not have any refills left, you may request refills at any time through one of the following ways: The online [employer] system (except Urgent Care), by calling your provider’s office, or by asking your pharmacy to contact your provider’s office with a refill request. Medication refills are processed only during regular business hours and may not be available until the next business day. Your provider may request additional information or to have a follow-up visit with you prior to refilling your medication.   *Please  Note: Medication refills are assigned a new Rx number when refilled electronically. Your pharmacy may indicate that no refills were authorized even though a new prescription for the same medication is available at the pharmacy. Please request the medicine by name with the pharmacy before contacting your provider for a refill.           [employer] Access Code: Activation code not generated  Current behaview Status: Active

## 2017-04-07 NOTE — PROGRESS NOTES
Subjective:      Sona Thakur is a 61 y.o. female who presents with Sinus Problem and Cough    Current medications reviewed.  Past Medical History   Diagnosis Date   • Allergy    • Hypertension    • GERD (gastroesophageal reflux disease)    • Unspecified cataract      early stages   • Atrial fibrillation (CMS-[facility]) 2/15/2013   • Heart burn    • Arthritis      osteoarthritis, right knee, left hand   • High cholesterol    • Glaucoma      left eye     Social History   Substance Use Topics   • Smoking status: Former Smoker -- 0.50 packs/day for 5 years     Types: Cigars     Quit date: 01/28/2015   • Smokeless tobacco: Never Used   • Alcohol Use: No     Family History Reviewed: noncontributory      Over one week ill with sinus congestion and drainage, now has burning in chest and heavy cough.  Denies sob, sputum, fevers in last 2 days.      Sinus Problem  Associated symptoms include congestion, coughing, headaches and a sore throat. Pertinent negatives include no chills, ear pain or shortness of breath.   Cough  Associated symptoms include a fever, headaches, myalgias and a sore throat. Pertinent negatives include no chest pain, chills, ear pain, rash or shortness of breath.       Review of Systems   Constitutional: Positive for fever and malaise/fatigue. Negative for chills.   HENT: Positive for congestion and sore throat. Negative for ear pain.    Respiratory: Positive for cough. Negative for shortness of breath.    Cardiovascular: Negative for chest pain.   Gastrointestinal: Negative for nausea, abdominal pain and diarrhea.   Musculoskeletal: Positive for myalgias. Negative for joint pain.   Skin: Negative for rash.   Neurological: Positive for headaches. Negative for dizziness.   All other systems reviewed and are negative.         Objective:     /70 mmHg  Pulse 66  Temp(Src) 36.3 °C (97.3 °F)  Resp 16  Wt 93.441 kg (206 lb)  SpO2 95%     Physical Exam   Constitutional: She is oriented to person,  place, and time. She appears well-developed and well-nourished. No distress.   HENT:   Right Ear: Tympanic membrane and ear canal normal.   Left Ear: Tympanic membrane and ear canal normal.   Nose: Mucosal edema present. Right sinus exhibits no maxillary sinus tenderness and no frontal sinus tenderness. Left sinus exhibits no maxillary sinus tenderness and no frontal sinus tenderness.   Mouth/Throat: Posterior oropharyngeal edema and posterior oropharyngeal erythema present. No oropharyngeal exudate.   Eyes: EOM are normal. Pupils are equal, round, and reactive to light.   Cardiovascular: Normal rate and regular rhythm.    Pulmonary/Chest: Effort normal. No respiratory distress. She has decreased breath sounds (mild) in the right middle field, the right lower field, the left middle field and the left lower field. She has no wheezes. She has no rales.   Lymphadenopathy:     She has no cervical adenopathy.   Neurological: She is alert and oriented to person, place, and time.   Skin: Skin is warm and dry.   Nursing note and vitals reviewed.              Assessment/Plan:     1. Bronchitis  azithromycin (ZITHROMAX) 250 MG Tab    predniSONE (DELTASONE) 20 MG Tab   2. Acute URI  azithromycin (ZITHROMAX) 250 MG Tab    predniSONE (DELTASONE) 20 MG Tab     Take all abx, push fluids rest.  OTC meds discussed to control symptoms.  Follow up with pcp in 2 weeks with further concerns. Patient reports understanding and agrees with plan.

## 2017-05-01 DIAGNOSIS — E87.6 HYPOKALEMIA: ICD-10-CM

## 2017-05-01 RX ORDER — POTASSIUM CHLORIDE 750 MG/1
10 TABLET, EXTENDED RELEASE ORAL
Qty: 30 TAB | Refills: 0 | Status: SHIPPED | OUTPATIENT
Start: 2017-05-01 | End: 2017-09-20 | Stop reason: SDUPTHER

## 2017-05-12 LAB
CHOLEST SERPL-MCNC: 225 MG/DL (ref 100–199)
COMMENT 011824: ABNORMAL
HDLC SERPL-MCNC: 55 MG/DL
LDLC SERPL CALC-MCNC: 128 MG/DL (ref 0–99)
TRIGL SERPL-MCNC: 208 MG/DL (ref 0–149)
VLDLC SERPL CALC-MCNC: 42 MG/DL (ref 5–40)

## 2017-05-14 RX ORDER — CHLORTHALIDONE 25 MG/1
TABLET ORAL
Qty: 90 TAB | Refills: 0 | Status: SHIPPED | OUTPATIENT
Start: 2017-05-14 | End: 2017-08-13 | Stop reason: SDUPTHER

## 2017-05-14 RX ORDER — LOSARTAN POTASSIUM 50 MG/1
TABLET ORAL
Qty: 90 TAB | Refills: 0 | Status: SHIPPED | OUTPATIENT
Start: 2017-05-14 | End: 2017-08-13 | Stop reason: SDUPTHER

## 2017-05-14 RX ORDER — OMEPRAZOLE 20 MG/1
CAPSULE, DELAYED RELEASE ORAL
Qty: 270 CAP | Refills: 0 | Status: SHIPPED | OUTPATIENT
Start: 2017-05-14 | End: 2017-09-25 | Stop reason: SDUPTHER

## 2017-05-15 ENCOUNTER — OFFICE VISIT (OUTPATIENT)
Dept: MEDICAL GROUP | Facility: MEDICAL CENTER | Age: 62
End: 2017-05-15
Payer: COMMERCIAL

## 2017-05-15 VITALS
HEIGHT: 63 IN | OXYGEN SATURATION: 97 % | TEMPERATURE: 97 F | HEART RATE: 63 BPM | WEIGHT: 203 LBS | DIASTOLIC BLOOD PRESSURE: 82 MMHG | BODY MASS INDEX: 35.97 KG/M2 | SYSTOLIC BLOOD PRESSURE: 130 MMHG

## 2017-05-15 DIAGNOSIS — E66.9 OBESITY (BMI 30-39.9): ICD-10-CM

## 2017-05-15 DIAGNOSIS — E78.5 HYPERLIPIDEMIA LDL GOAL <100: ICD-10-CM

## 2017-05-15 DIAGNOSIS — M25.50 ARTHRALGIA, UNSPECIFIED JOINT: ICD-10-CM

## 2017-05-15 DIAGNOSIS — G43.909 MIGRAINE SYNDROME: ICD-10-CM

## 2017-05-15 DIAGNOSIS — M79.10 MYALGIA: ICD-10-CM

## 2017-05-15 PROCEDURE — 99214 OFFICE O/P EST MOD 30 MIN: CPT | Performed by: NURSE PRACTITIONER

## 2017-05-15 RX ORDER — BUTALBITAL, ACETAMINOPHEN AND CAFFEINE 50; 325; 40 MG/1; MG/1; MG/1
1 TABLET ORAL EVERY 4 HOURS PRN
Qty: 30 TAB | Refills: 0 | Status: SHIPPED | OUTPATIENT
Start: 2017-05-15 | End: 2017-11-15 | Stop reason: SDUPTHER

## 2017-05-15 RX ORDER — TRAMADOL HYDROCHLORIDE 50 MG/1
50 TABLET ORAL
Qty: 30 TAB | Refills: 0 | Status: SHIPPED | OUTPATIENT
Start: 2017-05-15 | End: 2017-10-04

## 2017-05-15 NOTE — MR AVS SNAPSHOT
"        Sona Thakur   5/15/2017 3:30 PM   Office Visit   MRN: [id number]    Department:  [facility]   Dept Phone:  [phone]    Description:  Female : 1955   Provider:  JOSS Pedersen           Allergies as of 5/15/2017     Allergen Noted Reactions    Allegra 2010       \"Gave me a bad headache    Lipitor [Atorvastatin Calcium] 03/15/2013       Pravastatin 2016       Back, legs and hip pain      You were diagnosed with     Hyperlipidemia LDL goal <100   [052336]   improve diet and exercise.  f/u 6 months    Myalgia   [2013]   intolerant of statins.  continue low fat/chol/complex carb diet, inc exercise.  f/u 6 months call for lab slip    Arthralgia, unspecified joint   [7696499]   refill ultram.  last refill was .      Migraine syndrome   [743453]   refill fiorcet    Obesity (BMI 30-39.9)   [057023]         Vital Signs     Blood Pressure Pulse Temperature Height Weight Body Mass Index    130/82 mmHg 63 36.1 °C (97 °F) 1.6 m (5' 3\") 92.08 kg (203 lb) 35.97 kg/m2    Oxygen Saturation Breastfeeding? Smoking Status             97% No Former Smoker         Basic Information     Date Of Birth Sex Race Ethnicity Preferred Language    1955 Female White Non- English      Your appointments     Nov 15, 2017  7:30 AM   Established Patient with JOSS Pedersen   [facility])    [address]  [facility] [id number] [phone]           You will be receiving a confirmation call a few days before your appointment from our automated call confirmation system.              Problem List              ICD-10-CM Priority Class Noted - Resolved    Preventative health care Z00.00   2010 - Present    GERD (gastroesophageal reflux disease) K21.9   2012 - Present    Allergic rhinitis    2012 - Present    Hypertension I10   2012 - Present    Atrial fibrillation (CMS-HCC) I48.91   2/15/2013 - Present   " Glaucoma H40.9   Unknown - Present    Cataract H26.9   Unknown - Present    Nuclear sclerotic cataract of left eye H25.12   1/24/2017 - Present    Nuclear cataract H25.10   2/14/2017 - Present    Obesity (BMI 30-39.9) E66.9   5/15/2017 - Present      Health Maintenance        Date Due Completion Dates    MAMMOGRAM 1/19/2018 1/19/2017, 12/30/2015, 11/17/2014, 11/15/2013, 11/14/2012, 11/7/2011, 11/2/2010, 10/28/2009, 10/28/2009, 10/27/2008, 10/27/2008, 10/23/2007, 10/23/2007, 10/5/2006, 10/4/2005, 9/15/2004    IMM DTaP/Tdap/Td Vaccine (2 - Td) 5/23/2023 5/23/2013    COLONOSCOPY 3/29/2026 3/29/2016 (Done), 10/1/2005 (Done)    Override on 3/29/2016: Done    Override on 10/1/2005: Done            Current Immunizations     Influenza TIV (IM) 10/21/2014, 10/30/2013    Influenza Vaccine Adult HD 10/5/2016, 10/19/2015    SHINGLES VACCINE 6/26/2015  4:49 PM    Tdap Vaccine 5/23/2013      Below and/or attached are the medications your provider expects you to take. Review all of your home medications and newly ordered medications with your provider and/or pharmacist. Follow medication instructions as directed by your provider and/or pharmacist. Please keep your medication list with you and share with your provider. Update the information when medications are discontinued, doses are changed, or new medications (including over-the-counter products) are added; and carry medication information at all times in the event of emergency situations     Allergies:  ALLEGRA - (reactions not documented)     LIPITOR - (reactions not documented)     PRAVASTATIN - (reactions not documented)               Medications  Valid as of: May 15, 2017 -  4:00 PM    Generic Name Brand Name Tablet Size Instructions for use    Acetaminophen (Tab) TYLENOL 325 MG Take 2 Tabs by mouth every four hours as needed.        Aspirin (Tablet Delayed Response) ECOTRIN 325 MG Take 1 Tab by mouth every day.        Butalbital-APAP-Caffeine (Tab) FIORICET -40 MG  Take 1 Tab by mouth every four hours as needed for Headache.        Chlorthalidone (Tab) HYGROTON 25 MG TAKE ONE TABLET BY MOUTH ONCE DAILY        Cholecalciferol (Cap) Vitamin D 2000 UNITS Take 1 Cap by mouth every day.        Cholestyramine (Pack) QUESTRAN 4 G Take 4 g by mouth 2 times a day.        Ibuprofen   Take  by mouth.        Losartan Potassium (Tab) COZAAR 50 MG TAKE ONE TABLET BY MOUTH ONCE DAILY        Omega-3 Fatty Acids (Cap) fish oil 1000 MG Take 1 Cap by mouth 3 times a day, with meals.        Omeprazole (CAPSULE DELAYED RELEASE) PRILOSEC 20 MG TAKE ONE CAPSULE BY MOUTH IN THE MORNING AND THEN TAKE TWO CAPS IN THE EVENING        Potassium Chloride Ellen CR (Tab CR) K-DUR 10 MEQ Take 1 Tab by mouth in the PM on Mon, Wed, Fri, and Sat.        Timolol Maleate (Solution) TIMOPTIC 0.5 % Place 1 Drop in both eyes 2 times a day.        TraMADol HCl (Tab) ULTRAM 50 MG Take 1 Tab by mouth every 24 hours as needed.        Travoprost (Solution) TRAVATAN Z 0.004 % Place 1 Drop in left eye every evening.        .                 Medicines prescribed today were sent to:     [facility] [address] [place] [address] (), [place] [address] () [place] [address]    Phone: [phone] Fax: [phone]    Open 24 Hours?: No      Medication refill instructions:       If your prescription bottle indicates you have medication refills left, it is not necessary to call your provider’s office. Please contact your pharmacy and they will refill your medication.    If your prescription bottle indicates you do not have any refills left, you may request refills at any time through one of the following ways: The online [employer] system (except Urgent Care), by calling your provider’s office, or by asking your pharmacy to contact your provider’s office with a refill request. Medication refills are processed only during regular business hours and may not be available until the next business day. Your provider may  request additional information or to have a follow-up visit with you prior to refilling your medication.   *Please Note: Medication refills are assigned a new Rx number when refilled electronically. Your pharmacy may indicate that no refills were authorized even though a new prescription for the same medication is available at the pharmacy. Please request the medicine by name with the pharmacy before contacting your provider for a refill.           [facility] Access Code: Activation code not generated  Current [facility] Status: Active

## 2017-05-15 NOTE — PROGRESS NOTES
Subjective:      Sona Thakur is a 61 y.o. female who presents with No chief complaint on file.            HPI  Seen in f/u for migraines and joint pain r/t OA.  She is using ultram rarely for her various joint pains.  It controls her pain well along with activity.   She has migraines.  Sometimes r/t allergies.  Needs fiorcet refilled.  Uses it rarely.  Also has been grinking her teeth.   She hasn't been able to take the cholestyramine regularly d/t lots of gas.  Couldn't do and work.  She retired 1 week ago. Going to try and take more regularly.  She was taken off lipitor d/t myalgias all over.  Lipitor hurt her legs.  Pravastatin hurt her hips and back.  She has gained 20 lbs over the last 2 yrs d/t lots of stress with work and ill husbad.  Now she has quit work.  Going to start exercising.   Reviewed lab wiht pt. Her LP shows much inc in trg from 98 to 208.   She has been eating a lot of carbs.  HDL is good at 55.  LDL is up from 104 to 128.      Patient Active Problem List    Diagnosis Date Noted   • Obesity (BMI 30-39.9) 05/15/2017   • Nuclear cataract 02/14/2017   • Nuclear sclerotic cataract of left eye 01/24/2017   • Cataract    • Glaucoma    • Atrial fibrillation (CMS-[facility]) 02/15/2013   • GERD (gastroesophageal reflux disease) 05/22/2012   • Allergic rhinitis 05/22/2012   • Hypertension 05/22/2012   • Preventative health care 11/09/2010     Current Outpatient Prescriptions   Medication Sig Dispense Refill   • chlorthalidone (HYGROTON) 25 MG Tab TAKE ONE TABLET BY MOUTH ONCE DAILY 90 Tab 0   • omeprazole (PRILOSEC) 20 MG delayed-release capsule TAKE ONE CAPSULE BY MOUTH IN THE MORNING AND THEN TAKE TWO CAPS IN THE EVENING 270 Cap 0   • losartan (COZAAR) 50 MG Tab TAKE ONE TABLET BY MOUTH ONCE DAILY 90 Tab 0   • potassium chloride SA (K-DUR) 10 MEQ Tab CR Take 1 Tab by mouth in the PM on Mon, Wed, Fri, and Sat. 30 Tab 0   • cholestyramine (QUESTRAN) 4 G packet Take 4 g by mouth 2 times a day. 60 Each 3  "  • tramadol (ULTRAM) 50 MG Tab Take 1 Tab by mouth every 24 hours as needed. 30 Tab 0   • Omega-3 Fatty Acids (FISH OIL) 1000 MG Cap capsule Take 1 Cap by mouth 3 times a day, with meals. 90 Cap 0   • Cholecalciferol (VITAMIN D) 2000 UNITS Cap Take 1 Cap by mouth every day. 30 Cap 0   • acetaminophen (TYLENOL) 325 MG Tab Take 2 Tabs by mouth every four hours as needed. 30 Tab 0   • acetaminophen/caffeine/butalbital 325-40-50 mg (FIORICET) -40 MG Tab Take 1 Tab by mouth every four hours as needed for Headache. 30 Tab 0   • aspirin EC (ECOTRIN) 325 MG Tablet Delayed Response Take 1 Tab by mouth every day. 60 Tab    • timolol (TIMOPTIC) 0.5 % SOLN Place 1 Drop in both eyes 2 times a day.     • Ibuprofen (ADVIL PO) Take  by mouth.     • travoprost (TRAVOPROST) 0.004 % SOLN Place 1 Drop in left eye every evening.       No current facility-administered medications for this visit.     Allergies   Allergen Reactions   • Allegra      \"Gave me a bad headache   • Lipitor [Atorvastatin Calcium]    • Pravastatin      Back, legs and hip pain         ROS    Review of Systems   Constitutional: Negative.  Negative for fever, chills, weight loss, malaise/fatigue and diaphoresis.   HENT: Negative.  Negative for hearing loss, ear pain, nosebleeds, congestion, sore throat, neck pain, tinnitus and ear discharge.    Respiratory: Negative.  Negative for cough, hemoptysis, sputum production, shortness of breath, wheezing and stridor.    Cardiovascular: Negative.  Negative for chest pain, palpitations, orthopnea, claudication, leg swelling and PND.   Gastrointestinal: denies nausea, vomiting, diarrhea, constipation, heartburn, melena or hematochezia.  Genitourinary: Denies dysuria, hematuria, urinary incontinence, frequency or urgency.         Objective:     /82 mmHg  Pulse 63  Temp(Src) 36.1 °C (97 °F)  Ht 1.6 m (5' 3\")  Wt 92.08 kg (203 lb)  BMI 35.97 kg/m2  SpO2 97%  Breastfeeding? No     Physical Exam      Physical " Exam   Vitals reviewed.  Constitutional: oriented to person, place, and time. appears well-developed and well-nourished. No distress.   Cardiovascular: Normal rate, regular rhythm, normal heart sounds and intact distal pulses.  Exam reveals no gallop and no friction rub.  No murmur heard.  No carotid bruits.   Pulmonary/Chest: Effort normal and breath sounds normal. No stridor. No respiratory distress. no wheezes or rales. exhibits no tenderness.   Musculoskeletal: Normal range of motion. exhibits no edema. taran pedal pulses 2+.  Neurological: alert and oriented to person, place, and time. exhibits normal muscle tone. Coordination normal.   Skin: Skin is warm and dry. no diaphoresis.   Psychiatric: normal mood and affect. behavior is normal.            Assessment/Plan:     1. Hyperlipidemia LDL goal <100      improve diet and exercise.  f/u 6 months   2. Myalgia  tramadol (ULTRAM) 50 MG Tab    intolerant of statins.  continue low fat/chol/complex carb diet, inc exercise.  f/u 6 months call for lab slip   3. Arthralgia, unspecified joint      refill ultram.  last refill was 12/16.     4. Migraine syndrome  acetaminophen/caffeine/butalbital 325-40-50 mg (FIORICET) -40 MG Tab    refill fiorcet   5. Obesity (BMI 30-39.9)  Patient identified as having weight management issue.  Appropriate orders and counseling given.

## 2017-06-13 ENCOUNTER — OFFICE VISIT (OUTPATIENT)
Dept: MEDICAL GROUP | Facility: MEDICAL CENTER | Age: 62
End: 2017-06-13
Payer: COMMERCIAL

## 2017-06-13 VITALS
BODY MASS INDEX: 36.32 KG/M2 | SYSTOLIC BLOOD PRESSURE: 128 MMHG | TEMPERATURE: 97 F | HEART RATE: 78 BPM | DIASTOLIC BLOOD PRESSURE: 86 MMHG | OXYGEN SATURATION: 92 % | WEIGHT: 205 LBS | HEIGHT: 63 IN

## 2017-06-13 DIAGNOSIS — F32.89 OTHER DEPRESSION: ICD-10-CM

## 2017-06-13 DIAGNOSIS — M26.649 TMJ ARTHRITIS: ICD-10-CM

## 2017-06-13 PROCEDURE — 99214 OFFICE O/P EST MOD 30 MIN: CPT | Performed by: NURSE PRACTITIONER

## 2017-06-13 RX ORDER — DICLOFENAC SODIUM 75 MG/1
75 TABLET, DELAYED RELEASE ORAL 2 TIMES DAILY
Qty: 30 TAB | Refills: 0 | Status: SHIPPED | OUTPATIENT
Start: 2017-06-13 | End: 2017-10-04

## 2017-06-13 NOTE — PROGRESS NOTES
Subjective:      Sona Thakur is a 61 y.o. female who presents with Depression            Depression      Seen in f/u for depression.  She is having left jaw pain.  Went to dentist and mouth guard iddn't help. Pain in TMJ area.  Crackling in ear. p ain in lower jaw now all the way down.  Pain with eating and chewing.  Somedays pain is severe.  Trying to eat softer foods.  Using advil and tyl with limited relief.  Her  just had his 5th cva.  She is his caregiver.  Last week had a seizure.  She just feels too tired.  She has retired. They don'th ave any family here.   She has taken meds in the past once for depression but that was long ago. Having some suicidal thoughts.  He is still in rehab now but requires lots of care.   All she does is sleep.  She feels guilty about him having the strokes.        Patient Active Problem List    Diagnosis Date Noted   • Obesity (BMI 30-39.9) 05/15/2017   • Nuclear cataract 02/14/2017   • Nuclear sclerotic cataract of left eye 01/24/2017   • Cataract    • Glaucoma    • Atrial fibrillation (CMS-HCC) 02/15/2013   • GERD (gastroesophageal reflux disease) 05/22/2012   • Allergic rhinitis 05/22/2012   • Hypertension 05/22/2012   • Preventative health care 11/09/2010     Current Outpatient Prescriptions   Medication Sig Dispense Refill   • tramadol (ULTRAM) 50 MG Tab Take 1 Tab by mouth every 24 hours as needed. 30 Tab 0   • acetaminophen/caffeine/butalbital 325-40-50 mg (FIORICET) -40 MG Tab Take 1 Tab by mouth every four hours as needed for Headache. 30 Tab 0   • chlorthalidone (HYGROTON) 25 MG Tab TAKE ONE TABLET BY MOUTH ONCE DAILY 90 Tab 0   • omeprazole (PRILOSEC) 20 MG delayed-release capsule TAKE ONE CAPSULE BY MOUTH IN THE MORNING AND THEN TAKE TWO CAPS IN THE EVENING 270 Cap 0   • losartan (COZAAR) 50 MG Tab TAKE ONE TABLET BY MOUTH ONCE DAILY 90 Tab 0   • potassium chloride SA (K-DUR) 10 MEQ Tab CR Take 1 Tab by mouth in the PM on Mon, Wed, Fri, and Sat. 30  "Tab 0   • cholestyramine (QUESTRAN) 4 G packet Take 4 g by mouth 2 times a day. 60 Each 3   • Omega-3 Fatty Acids (FISH OIL) 1000 MG Cap capsule Take 1 Cap by mouth 3 times a day, with meals. 90 Cap 0   • Cholecalciferol (VITAMIN D) 2000 UNITS Cap Take 1 Cap by mouth every day. 30 Cap 0   • acetaminophen (TYLENOL) 325 MG Tab Take 2 Tabs by mouth every four hours as needed. 30 Tab 0   • aspirin EC (ECOTRIN) 325 MG Tablet Delayed Response Take 1 Tab by mouth every day. 60 Tab    • timolol (TIMOPTIC) 0.5 % SOLN Place 1 Drop in both eyes 2 times a day.     • Ibuprofen (ADVIL PO) Take  by mouth.     • travoprost (TRAVOPROST) 0.004 % SOLN Place 1 Drop in left eye every evening.       No current facility-administered medications for this visit.     Allergies   Allergen Reactions   • Allegra      \"Gave me a bad headache   • Lipitor [Atorvastatin Calcium]    • Pravastatin      Back, legs and hip pain       ROS    Review of Systems   Constitutional: Negative.  Negative for fever, chills, weight loss, malaise/fatigue and diaphoresis.   HENT: Negative.  Negative for hearing loss, ear pain, nosebleeds, congestion, sore throat, neck pain, tinnitus and ear discharge.    Respiratory: Negative.  Negative for cough, hemoptysis, sputum production, shortness of breath, wheezing and stridor.    Cardiovascular: Negative.  Negative for chest pain, palpitations, orthopnea, claudication, leg swelling and PND.   Gastrointestinal: denies nausea, vomiting, diarrhea, constipation, heartburn, melena or hematochezia.  Genitourinary: Denies dysuria, hematuria, urinary incontinence, frequency or urgency.         Objective:     /86 mmHg  Pulse 78  Temp(Src) 36.1 °C (97 °F)  Ht 1.6 m (5' 3\")  Wt 92.987 kg (205 lb)  BMI 36.32 kg/m2  SpO2 92%  Breastfeeding? No     Physical Exam      Physical Exam   Vitals reviewed.  Constitutional: oriented to person, place, and time. appears well-developed and well-nourished. No distress.   HENT:  Head: " Normocephalic and atraumatic. Right Ear: External ear normal. Left Ear: External ear normal. Nose: Nose normal. Mouth/Throat: Oropharynx is clear and moist. No oropharyngeal exudate.  taran tm wnl.  Eyes: Right eye exhibits no discharge. Left eye exhibits no discharge. No scleral icterus.  Neck: No JVD present.  Cardiovascular: Normal rate, regular rhythm, normal heart sounds and intact distal pulses.  Exam reveals no gallop and no friction rub.  No murmur heard.  No carotid bruits.   Pulmonary/Chest: Effort normal and breath sounds normal. No stridor. No respiratory distress. no wheezes or rales. exhibits no tenderness.   Musculoskeletal: Normal range of motion. exhibits no edema. taran pedal pulses 2+.  Lymphadenopathy: no cervical or supraclavicular adenopathy.   Abd:  No CVAT,  Soft,  Bs noted in all quadrants.  No HSM.  No abdominal tenderness.  Neurological: alert and oriented to person, place, and time. exhibits normal muscle tone. Coordination normal.   Skin: Skin is warm and dry. no diaphoresis.   Psychiatric: normal mood and affect. behavior is normal.            Assessment/Plan:       1. Other depression  sertraline (ZOLOFT) 50 MG Tab    try zoloft.  f/u if not helping.  call if needs referral to counselor   2. TMJ arthritis  DX-TEMPOROMANDIBULAR JOINTS (TMJ)    diclofenac EC (VOLTAREN) 75 MG Tablet Delayed Response    xray left tmj.  try voltaren.  refer physiatry if not better.

## 2017-06-13 NOTE — MR AVS SNAPSHOT
"        Sona Thakur   2017 8:45 AM   Office Visit   MRN: [id number]    Department:  [facility]   Dept Phone:  [phone]    Description:  Female : 1955   Provider:  JOSS Pedersen           Reason for Visit     Depression           Allergies as of 2017     Allergen Noted Reactions    Allegra 2010       \"Gave me a bad headache    Lipitor [Atorvastatin Calcium] 03/15/2013       Pravastatin 2016       Back, legs and hip pain      You were diagnosed with     Other depression   [3949804]   try zoloft.  f/u if not helping.  call if needs referral to counselor    TMJ arthritis   [[id number]]   xray left tmj.  try voltaren.  refer physiatry if not better.      Vital Signs     Blood Pressure Pulse Temperature Height Weight Body Mass Index    128/86 mmHg 78 36.1 °C (97 °F) 1.6 m (5' 3\") 92.987 kg (205 lb) 36.32 kg/m2    Oxygen Saturation Breastfeeding? Smoking Status             92% No Former Smoker         Basic Information     Date Of Birth Sex Race Ethnicity Preferred Language    1955 Female White Non- English      Your appointments     Nov 15, 2017  7:30 AM   Established Patient with JOSS Pedersen   [facility])    [address]  [facility] [address] [phone]           You will be receiving a confirmation call a few days before your appointment from our automated call confirmation system.              Problem List              ICD-10-CM Priority Class Noted - Resolved    Preventative health care Z00.00   2010 - Present    GERD (gastroesophageal reflux disease) K21.9   2012 - Present    Allergic rhinitis    2012 - Present    Hypertension I10   2012 - Present    Atrial fibrillation (CMS-HCC) I48.91   2/15/2013 - Present    Glaucoma H40.9   Unknown - Present    Cataract H26.9   Unknown - Present    Nuclear sclerotic cataract of left eye H25.12   2017 - Present    Nuclear " cataract H25.10   2/14/2017 - Present    Obesity (BMI 30-39.9) E66.9   5/15/2017 - Present      Health Maintenance        Date Due Completion Dates    MAMMOGRAM 1/19/2018 1/19/2017, 12/30/2015, 11/17/2014, 11/15/2013, 11/14/2012, 11/7/2011, 11/2/2010, 10/28/2009, 10/28/2009, 10/27/2008, 10/27/2008, 10/23/2007, 10/23/2007, 10/5/2006, 10/4/2005, 9/15/2004    IMM DTaP/Tdap/Td Vaccine (2 - Td) 5/23/2023 5/23/2013    COLONOSCOPY 3/29/2026 3/29/2016 (Done), 10/1/2005 (Done)    Override on 3/29/2016: Done    Override on 10/1/2005: Done            Current Immunizations     Influenza TIV (IM) 10/21/2014, 10/30/2013    Influenza Vaccine Adult HD 10/5/2016, 10/19/2015    SHINGLES VACCINE 6/26/2015  4:49 PM    Tdap Vaccine 5/23/2013      Below and/or attached are the medications your provider expects you to take. Review all of your home medications and newly ordered medications with your provider and/or pharmacist. Follow medication instructions as directed by your provider and/or pharmacist. Please keep your medication list with you and share with your provider. Update the information when medications are discontinued, doses are changed, or new medications (including over-the-counter products) are added; and carry medication information at all times in the event of emergency situations     Allergies:  ALLEGRA - (reactions not documented)     LIPITOR - (reactions not documented)     PRAVASTATIN - (reactions not documented)               Medications  Valid as of: June 13, 2017 -  9:21 AM    Generic Name Brand Name Tablet Size Instructions for use    Acetaminophen (Tab) TYLENOL 325 MG Take 2 Tabs by mouth every four hours as needed.        Aspirin (Tablet Delayed Response) ECOTRIN 325 MG Take 1 Tab by mouth every day.        Butalbital-APAP-Caffeine (Tab) FIORICET -40 MG Take 1 Tab by mouth every four hours as needed for Headache.        Chlorthalidone (Tab) HYGROTON 25 MG TAKE ONE TABLET BY MOUTH ONCE DAILY         Cholecalciferol (Cap) Vitamin D 2000 UNITS Take 1 Cap by mouth every day.        Cholestyramine (Pack) QUESTRAN 4 G Take 4 g by mouth 2 times a day.        Diclofenac Sodium (Tablet Delayed Response) VOLTAREN 75 MG Take 1 Tab by mouth 2 times a day.        Ibuprofen   Take  by mouth.        Losartan Potassium (Tab) COZAAR 50 MG TAKE ONE TABLET BY MOUTH ONCE DAILY        Omega-3 Fatty Acids (Cap) fish oil 1000 MG Take 1 Cap by mouth 3 times a day, with meals.        Omeprazole (CAPSULE DELAYED RELEASE) PRILOSEC 20 MG TAKE ONE CAPSULE BY MOUTH IN THE MORNING AND THEN TAKE TWO CAPS IN THE EVENING        Potassium Chloride Ellen CR (Tab CR) K-DUR 10 MEQ Take 1 Tab by mouth in the PM on Mon, Wed, Fri, and Sat.        Sertraline HCl (Tab) ZOLOFT 50 MG Take 1 Tab by mouth every day.        Timolol Maleate (Solution) TIMOPTIC 0.5 % Place 1 Drop in both eyes 2 times a day.        TraMADol HCl (Tab) ULTRAM 50 MG Take 1 Tab by mouth every 24 hours as needed.        Travoprost (Solution) TRAVATAN Z 0.004 % Place 1 Drop in left eye every evening.        .                 Medicines prescribed today were sent to:     [facility] [address] [place] [address]), [place] - [address]) [place] [address]    Phone: [phone] Fax: [phone]    Open 24 Hours?: No      Medication refill instructions:       If your prescription bottle indicates you have medication refills left, it is not necessary to call your provider’s office. Please contact your pharmacy and they will refill your medication.    If your prescription bottle indicates you do not have any refills left, you may request refills at any time through one of the following ways: The online [email or web address] system (except Urgent Care), by calling your provider’s office, or by asking your pharmacy to contact your provider’s office with a refill request. Medication refills are processed only during regular business hours and may not be available until the next  business day. Your provider may request additional information or to have a follow-up visit with you prior to refilling your medication.   *Please Note: Medication refills are assigned a new Rx number when refilled electronically. Your pharmacy may indicate that no refills were authorized even though a new prescription for the same medication is available at the pharmacy. Please request the medicine by name with the pharmacy before contacting your provider for a refill.        Your To Do List     Future Labs/Procedures Complete By Expires    DX-TEMPOROMANDIBULAR JOINTS (TMJ)  As directed 12/14/2017         Yours Florally Access Code: Activation code not generated  Current Yours Florally Status: Active

## 2017-06-16 ENCOUNTER — HOSPITAL ENCOUNTER (OUTPATIENT)
Dept: RADIOLOGY | Facility: MEDICAL CENTER | Age: 62
End: 2017-06-16
Attending: NURSE PRACTITIONER
Payer: COMMERCIAL

## 2017-06-16 DIAGNOSIS — M26.649 TMJ ARTHRITIS: ICD-10-CM

## 2017-06-16 PROCEDURE — 70330 X-RAY EXAM OF JAW JOINTS: CPT

## 2017-06-18 ENCOUNTER — TELEPHONE (OUTPATIENT)
Dept: MEDICAL GROUP | Facility: MEDICAL CENTER | Age: 62
End: 2017-06-18

## 2017-06-18 DIAGNOSIS — R68.84 JAW PAIN: ICD-10-CM

## 2017-06-19 NOTE — TELEPHONE ENCOUNTER
Please let pt know that the TMJ is wnl but they recommend further testing.  We can proceed with MRI if she is willing.

## 2017-06-25 ENCOUNTER — HOSPITAL ENCOUNTER (OUTPATIENT)
Dept: RADIOLOGY | Facility: MEDICAL CENTER | Age: 62
End: 2017-06-25
Attending: NURSE PRACTITIONER
Payer: COMMERCIAL

## 2017-06-25 DIAGNOSIS — R68.84 JAW PAIN: ICD-10-CM

## 2017-06-25 PROCEDURE — 70336 MAGNETIC IMAGE JAW JOINT: CPT

## 2017-06-27 ENCOUNTER — TELEPHONE (OUTPATIENT)
Dept: MEDICAL GROUP | Facility: MEDICAL CENTER | Age: 62
End: 2017-06-27

## 2017-06-27 NOTE — TELEPHONE ENCOUNTER
----- Message from Andre Gordon M.D. sent at 6/26/2017  8:04 PM PDT -----  Please notify alla's patient that the MRI of her jaw is normal

## 2017-07-09 ENCOUNTER — TELEPHONE (OUTPATIENT)
Dept: MEDICAL GROUP | Facility: MEDICAL CENTER | Age: 62
End: 2017-07-09

## 2017-07-09 DIAGNOSIS — R68.84 JAW PAIN: ICD-10-CM

## 2017-07-10 NOTE — TELEPHONE ENCOUNTER
Pt states she takes Tramadol at times for the pain. She would like referral placed to specialist. Pt notified their office will contact her to schedule and if she does not hear from them this week to call us.

## 2017-08-14 RX ORDER — CHLORTHALIDONE 25 MG/1
TABLET ORAL
Qty: 90 TAB | Refills: 3 | Status: SHIPPED | OUTPATIENT
Start: 2017-08-14 | End: 2018-10-15 | Stop reason: SDUPTHER

## 2017-08-14 RX ORDER — LOSARTAN POTASSIUM 50 MG/1
TABLET ORAL
Qty: 90 TAB | Refills: 3 | Status: SHIPPED | OUTPATIENT
Start: 2017-08-14 | End: 2018-08-16 | Stop reason: SDUPTHER

## 2017-08-18 ENCOUNTER — HOSPITAL ENCOUNTER (OUTPATIENT)
Dept: RADIOLOGY | Facility: MEDICAL CENTER | Age: 62
End: 2017-08-18
Attending: PAIN MEDICINE
Payer: COMMERCIAL

## 2017-08-18 DIAGNOSIS — M25.551 RIGHT HIP PAIN: ICD-10-CM

## 2017-08-18 PROCEDURE — 73502 X-RAY EXAM HIP UNI 2-3 VIEWS: CPT | Mod: RT

## 2017-09-15 ENCOUNTER — OFFICE VISIT (OUTPATIENT)
Dept: MEDICAL GROUP | Facility: MEDICAL CENTER | Age: 62
End: 2017-09-15
Payer: COMMERCIAL

## 2017-09-15 VITALS
RESPIRATION RATE: 16 BRPM | WEIGHT: 203 LBS | HEIGHT: 63 IN | DIASTOLIC BLOOD PRESSURE: 84 MMHG | HEART RATE: 68 BPM | OXYGEN SATURATION: 96 % | SYSTOLIC BLOOD PRESSURE: 124 MMHG | BODY MASS INDEX: 35.97 KG/M2 | TEMPERATURE: 97.3 F

## 2017-09-15 DIAGNOSIS — Z80.3 FAMILY HISTORY OF BREAST CANCER: ICD-10-CM

## 2017-09-15 DIAGNOSIS — F43.9 STRESS: ICD-10-CM

## 2017-09-15 DIAGNOSIS — N63.0 BREAST NODULE: ICD-10-CM

## 2017-09-15 PROCEDURE — G0206 DX MAMMO INCL CAD UNI: HCPCS | Performed by: NURSE PRACTITIONER

## 2017-09-15 PROCEDURE — 99214 OFFICE O/P EST MOD 30 MIN: CPT | Performed by: NURSE PRACTITIONER

## 2017-09-15 RX ORDER — SERTRALINE HYDROCHLORIDE 100 MG/1
100 TABLET, FILM COATED ORAL DAILY
Qty: 30 TAB | Refills: 1 | Status: SHIPPED | OUTPATIENT
Start: 2017-09-15 | End: 2017-11-22 | Stop reason: SDUPTHER

## 2017-09-15 NOTE — ASSESSMENT & PLAN NOTE
Primary caregiver for her  has had several hemorrhagic strokes, requires daily assistance at home  She's felt overwhelmed, was having some slight depression with this. She started on Zoloft 50 mg daily which does seem to be helping but questions if she may need a dose increase  Denies SI/HI, manic behavior, insomnia or hypersomnia

## 2017-09-15 NOTE — PROGRESS NOTES
Subjective:     Chief Complaint   Patient presents with   • Breast Problem     Sona Thakur is a 61 y.o. female here today to follow up on:    Stress  Primary caregiver for her  has had several hemorrhagic strokes, requires daily assistance at home  She's felt overwhelmed, was having some slight depression with this. She started on Zoloft 50 mg daily which does seem to be helping but questions if she may need a dose increase  Denies SI/HI, manic behavior, insomnia or hypersomnia    Breast nodule  Noticed a small firm area in the left inner breast 2 days ago that is raising concern for her. It has been tender, has not changed in size. There is no nipple discharge or skin changes   she did have 3 maternal aunts with breast cancer  Her last mammogram was in January and normal         Current medicines (including changes today)  Current Outpatient Prescriptions   Medication Sig Dispense Refill   • sertraline (ZOLOFT) 100 MG Tab Take 1 Tab by mouth every day. 30 Tab 1   • chlorthalidone (HYGROTON) 25 MG Tab TAKE ONE TABLET BY MOUTH ONCE DAILY 90 Tab 3   • losartan (COZAAR) 50 MG Tab TAKE ONE TABLET BY MOUTH ONCE DAILY 90 Tab 3   • sertraline (ZOLOFT) 50 MG Tab Take 1 Tab by mouth every day. 30 Tab 3   • diclofenac EC (VOLTAREN) 75 MG Tablet Delayed Response Take 1 Tab by mouth 2 times a day. 30 Tab 0   • tramadol (ULTRAM) 50 MG Tab Take 1 Tab by mouth every 24 hours as needed. 30 Tab 0   • acetaminophen/caffeine/butalbital 325-40-50 mg (FIORICET) -40 MG Tab Take 1 Tab by mouth every four hours as needed for Headache. 30 Tab 0   • omeprazole (PRILOSEC) 20 MG delayed-release capsule TAKE ONE CAPSULE BY MOUTH IN THE MORNING AND THEN TAKE TWO CAPS IN THE EVENING 270 Cap 0   • potassium chloride SA (K-DUR) 10 MEQ Tab CR Take 1 Tab by mouth in the PM on Mon, Wed, Fri, and Sat. 30 Tab 0   • cholestyramine (QUESTRAN) 4 G packet Take 4 g by mouth 2 times a day. 60 Each 3   • Omega-3 Fatty Acids (FISH OIL) 1000  "MG Cap capsule Take 1 Cap by mouth 3 times a day, with meals. 90 Cap 0   • Cholecalciferol (VITAMIN D) 2000 UNITS Cap Take 1 Cap by mouth every day. 30 Cap 0   • acetaminophen (TYLENOL) 325 MG Tab Take 2 Tabs by mouth every four hours as needed. 30 Tab 0   • aspirin EC (ECOTRIN) 325 MG Tablet Delayed Response Take 1 Tab by mouth every day. 60 Tab    • timolol (TIMOPTIC) 0.5 % SOLN Place 1 Drop in both eyes 2 times a day.     • Ibuprofen (ADVIL PO) Take  by mouth.     • travoprost (TRAVOPROST) 0.004 % SOLN Place 1 Drop in left eye every evening.       No current facility-administered medications for this visit.      She  has a past medical history of Allergy; Arthritis; Atrial fibrillation (CMS-[facility]) (2/15/2013); GERD (gastroesophageal reflux disease); Glaucoma; Heart burn; High cholesterol; Hypertension; and Unspecified cataract. She also has no past medical history of COPD or Fall.    ROS included above     Objective:     Blood pressure 124/84, pulse 68, temperature 36.3 °C (97.3 °F), resp. rate 16, height 1.6 m (5' 3\"), weight 92.1 kg (203 lb), SpO2 96 %. Body mass index is 35.96 kg/m².     Physical Exam:  General: Alert, oriented in no acute distress.  Eye contact is good, speech is normal, affect calm  Lungs: clear to auscultation bilaterally, normal effort, no wheeze/ rhonchi/ rales.  CV: regular rate and rhythm, S1, S2, no murmur  Breast: Right breast smooth, no masses, no nipple discharge. Left breast with approximately 1-2 cm firm mobile nodule at 8:00. No skin changes, no retraction or nipple discharge  Ext: no edema, color normal, vascularity normal, temperature normal    Assessment and Plan:   The following treatment plan was discussed   1. Breast nodule  1-2 cm firm nodule in the left inner breast at approximately 8:00, possibly a cyst. However, given her family history have ordered a diagnostic mammogram. Follow-up pending results. Advised to reduce caffeine consumption and see if this helps with her " discomfort  CA DX MAMMO UNI, INCLUDES CAD    MA-MAMMO DIAGNOSTIC UNILAT W/ANIYAH W/CAD LEFT       2. Stress  Primary caregiver for her , feeling overwhelmed and stressed. She has had improvement with 50 mg Zoloft and is requesting increased dose. Increase to 100 mg daily, contact me with any concerns  sertraline (ZOLOFT) 100 MG Tab   3. Family history of breast cancer                 Followup: Pending test         Please note that this dictation was created using voice recognition software. I have worked with consultants from the vendor as well as technical experts from [facility] [employer] to optimize the interface. I have made every reasonable attempt to correct obvious errors, but I expect that there are errors of grammar and possibly content that I did not discover before finalizing the note.

## 2017-09-15 NOTE — ASSESSMENT & PLAN NOTE
Noticed a small firm area in the left inner breast 2 days ago that is raising concern for her. It has been tender, has not changed in size. There is no nipple discharge or skin changes   she did have 3 maternal aunts with breast cancer  Her last mammogram was in January and normal

## 2017-09-20 DIAGNOSIS — E87.6 HYPOKALEMIA: ICD-10-CM

## 2017-09-20 RX ORDER — POTASSIUM CHLORIDE 750 MG/1
TABLET, EXTENDED RELEASE ORAL
Qty: 30 TAB | Refills: 3 | Status: SHIPPED | OUTPATIENT
Start: 2017-09-20 | End: 2018-04-10 | Stop reason: SDUPTHER

## 2017-09-25 RX ORDER — OMEPRAZOLE 20 MG/1
CAPSULE, DELAYED RELEASE ORAL
Qty: 90 CAP | Refills: 1 | Status: SHIPPED | OUTPATIENT
Start: 2017-09-25 | End: 2018-01-08 | Stop reason: SDUPTHER

## 2017-10-04 ENCOUNTER — HOSPITAL ENCOUNTER (OUTPATIENT)
Dept: RADIOLOGY | Facility: MEDICAL CENTER | Age: 62
End: 2017-10-04
Attending: NURSE PRACTITIONER
Payer: COMMERCIAL

## 2017-10-04 ENCOUNTER — APPOINTMENT (OUTPATIENT)
Dept: ADMISSIONS | Facility: MEDICAL CENTER | Age: 62
End: 2017-10-04
Attending: NURSE PRACTITIONER
Payer: COMMERCIAL

## 2017-10-04 DIAGNOSIS — Z01.812 PRE-OPERATIVE LABORATORY EXAMINATION: ICD-10-CM

## 2017-10-04 DIAGNOSIS — Z80.3 FAMILY HISTORY OF BREAST CANCER: ICD-10-CM

## 2017-10-04 DIAGNOSIS — N63.0 BREAST NODULE: ICD-10-CM

## 2017-10-04 LAB — EKG IMPRESSION: NORMAL

## 2017-10-04 PROCEDURE — 76642 ULTRASOUND BREAST LIMITED: CPT | Mod: LT

## 2017-10-04 PROCEDURE — 93005 ELECTROCARDIOGRAM TRACING: CPT

## 2017-10-04 PROCEDURE — G0206 DX MAMMO INCL CAD UNI: HCPCS | Mod: LT

## 2017-10-04 PROCEDURE — 93010 ELECTROCARDIOGRAM REPORT: CPT | Performed by: INTERNAL MEDICINE

## 2017-10-04 RX ORDER — ASCORBIC ACID 500 MG
500 TABLET ORAL DAILY
COMMUNITY

## 2017-10-11 ENCOUNTER — HOSPITAL ENCOUNTER (OUTPATIENT)
Facility: MEDICAL CENTER | Age: 62
End: 2017-10-11
Attending: OPHTHALMOLOGY | Admitting: OPHTHALMOLOGY
Payer: COMMERCIAL

## 2017-10-11 VITALS
DIASTOLIC BLOOD PRESSURE: 81 MMHG | HEART RATE: 79 BPM | RESPIRATION RATE: 16 BRPM | BODY MASS INDEX: 34.51 KG/M2 | HEIGHT: 64 IN | OXYGEN SATURATION: 94 % | WEIGHT: 202.16 LBS | TEMPERATURE: 97.3 F | SYSTOLIC BLOOD PRESSURE: 141 MMHG

## 2017-10-11 PROBLEM — H53.453: Status: ACTIVE | Noted: 2017-10-11

## 2017-10-11 PROCEDURE — 500128 HCHG BOVIE, NEEDLE TIP 3CM: Performed by: OPHTHALMOLOGY

## 2017-10-11 PROCEDURE — 160035 HCHG PACU - 1ST 60 MINS PHASE I: Performed by: OPHTHALMOLOGY

## 2017-10-11 PROCEDURE — 502240 HCHG MISC OR SUPPLY RC 0272: Performed by: OPHTHALMOLOGY

## 2017-10-11 PROCEDURE — 500558 HCHG EYE SHIELD W/GARTER (FOX): Performed by: OPHTHALMOLOGY

## 2017-10-11 PROCEDURE — 700101 HCHG RX REV CODE 250

## 2017-10-11 PROCEDURE — 160002 HCHG RECOVERY MINUTES (STAT): Performed by: OPHTHALMOLOGY

## 2017-10-11 PROCEDURE — 160028 HCHG SURGERY MINUTES - 1ST 30 MINS LEVEL 3: Performed by: OPHTHALMOLOGY

## 2017-10-11 PROCEDURE — 160039 HCHG SURGERY MINUTES - EA ADDL 1 MIN LEVEL 3: Performed by: OPHTHALMOLOGY

## 2017-10-11 PROCEDURE — 500123 HCHG BOVIE, CONTROL W/BLADE: Performed by: OPHTHALMOLOGY

## 2017-10-11 PROCEDURE — 700102 HCHG RX REV CODE 250 W/ 637 OVERRIDE(OP)

## 2017-10-11 PROCEDURE — 160048 HCHG OR STATISTICAL LEVEL 1-5: Performed by: OPHTHALMOLOGY

## 2017-10-11 PROCEDURE — A9270 NON-COVERED ITEM OR SERVICE: HCPCS

## 2017-10-11 PROCEDURE — 160036 HCHG PACU - EA ADDL 30 MINS PHASE I: Performed by: OPHTHALMOLOGY

## 2017-10-11 PROCEDURE — 502573 HCHG PACK, ENT: Performed by: OPHTHALMOLOGY

## 2017-10-11 PROCEDURE — 160009 HCHG ANES TIME/MIN: Performed by: OPHTHALMOLOGY

## 2017-10-11 PROCEDURE — 500126 HCHG BOVIE, NEEDLE TIP: Performed by: OPHTHALMOLOGY

## 2017-10-11 PROCEDURE — 700111 HCHG RX REV CODE 636 W/ 250 OVERRIDE (IP)

## 2017-10-11 PROCEDURE — 501838 HCHG SUTURE GENERAL: Performed by: OPHTHALMOLOGY

## 2017-10-11 RX ORDER — LIDOCAINE HYDROCHLORIDE 10 MG/ML
INJECTION, SOLUTION INFILTRATION; PERINEURAL
Status: COMPLETED
Start: 2017-10-11 | End: 2017-10-11

## 2017-10-11 RX ORDER — LIDOCAINE HYDROCHLORIDE AND EPINEPHRINE BITARTRATE 20; .01 MG/ML; MG/ML
INJECTION, SOLUTION SUBCUTANEOUS
Status: DISCONTINUED | OUTPATIENT
Start: 2017-10-11 | End: 2017-10-11 | Stop reason: HOSPADM

## 2017-10-11 RX ORDER — SODIUM CHLORIDE, SODIUM LACTATE, POTASSIUM CHLORIDE, CALCIUM CHLORIDE 600; 310; 30; 20 MG/100ML; MG/100ML; MG/100ML; MG/100ML
INJECTION, SOLUTION INTRAVENOUS CONTINUOUS
Status: DISCONTINUED | OUTPATIENT
Start: 2017-10-11 | End: 2017-10-11 | Stop reason: HOSPADM

## 2017-10-11 RX ORDER — SCOLOPAMINE TRANSDERMAL SYSTEM 1 MG/1
PATCH, EXTENDED RELEASE TRANSDERMAL
Status: DISCONTINUED
Start: 2017-10-11 | End: 2017-10-11 | Stop reason: HOSPADM

## 2017-10-11 RX ORDER — ERYTHROMYCIN 5 MG/G
OINTMENT OPHTHALMIC
Status: DISCONTINUED | OUTPATIENT
Start: 2017-10-11 | End: 2017-10-11 | Stop reason: HOSPADM

## 2017-10-11 RX ORDER — TETRACAINE HYDROCHLORIDE 5 MG/ML
SOLUTION OPHTHALMIC
Status: DISCONTINUED
Start: 2017-10-11 | End: 2017-10-11 | Stop reason: HOSPADM

## 2017-10-11 RX ORDER — LIDOCAINE HYDROCHLORIDE AND EPINEPHRINE BITARTRATE 20; .01 MG/ML; MG/ML
INJECTION, SOLUTION SUBCUTANEOUS
Status: DISCONTINUED
Start: 2017-10-11 | End: 2017-10-11 | Stop reason: HOSPADM

## 2017-10-11 RX ADMIN — SODIUM CHLORIDE, SODIUM LACTATE, POTASSIUM CHLORIDE, CALCIUM CHLORIDE 1000 ML: 600; 310; 30; 20 INJECTION, SOLUTION INTRAVENOUS at 07:45

## 2017-10-11 ASSESSMENT — PAIN SCALES - GENERAL
PAINLEVEL_OUTOF10: 0

## 2017-10-11 NOTE — OR NURSING
1109 received pt from OR with Dr. Arias, oral airway in place and chin lift used for adequate respirations. VSS breathing even and unlabored  1123 airway removed without difficulty.  1200 ice chips given- pt tolerating well. Pt denies pain or nausea  1300 pt states she feels ready to go home, ride called.  1340 friend at bedside, discharge instructions given- all questions and concerns addressed. IV removed and pt discharged out to car in ..

## 2017-10-11 NOTE — OP REPORT
DATE OF OPERATION:  10/11/2017    ATTENDING SURGEON:  Nitish Martínez MD.    ASSISTANT SURGEON:  None.    ANESTHESIOLOGIST:  BARBIE Arias MD.    ANESTHESIA:  Local MAC.    PREOPERATIVE DIAGNOSES:  1.  Brow ptosis, bilateral.  2.  Dermatochalasis, bilateral upper eyelids.   3.  Visual obstruction, both eyes secondary to brow ptosis and dermatochalasis..    POSTOPERATIVE DIAGNOSES:  1.  Brow ptosis, bilateral.  2.  Dermatochalasis, bilateral upper eyelids.   3.  Visual obstruction, both eyes secondary to brow ptosis.    PROCEDURE PERFORMED:   1.  Direct browplasty, functional, bilateral.  2.  Blepharoplasty, functional, bilateral upper eyelids.     SPECIMENS:  None.    IMPLANTS:  None.    COMPLICATIONS:  None.    ESTIMATED BLOOD LOSS:  Less than 5 mL.    INDICATIONS FOR PROCEDURE:  This is an 61 y.o. female with a history of   visual obstruction secondary to a combination of brow ptosis and dermatochalasis.  This was confirmed on visual   field testing.  Because of these findings, I recommended to the patient to   undergo the above listed procedures.  The risks, benefits and alternatives   were explained to the patient in detail and informed consent was signed.    PROCEDURE IN DETAIL:  The patient was first met in the preoperative holding   area and sat upright and appropriate markings were placed superior to the brow   area indicating peak of the brow just medial to the lateral canthus and   lateral to the lateral limbus.  The brow was then marked appropriately for   direct browplasty by approximating the amount that would need to be removed to allow appropriate elevation of the brow.      Once these marks were made, the patient was brought to the operating room and   laid supine on the operating room table.  After the induction of general anesthesia, the full face was then prepped and   draped in usual sterile fashion.  The brow incisions markings were completed and verified for symmetry.      The upper eyelid  blepharoplasty was commenced next.  A lid crease incision was then designed using a   marking pen taking into account the patient's natural lid crease at 9 mm   bilaterally. The superior extent of the blepharoplasty excision area was then   marked out with a caliper from the inferior brow hairs measuring 12 mm. This   was then verified with smooth forceps and then the excision area was then   drawn in completely with a marking pen. The procedure was then begun on   the right side first.     The right upper eyelid was then infiltrated with 2% lidocaine with   epinephrine. A #15 blade was used to incise the skin down to the orbicularis fibers along the excision areas marked out previously. Bipolar cautery was used for hemostasis. A monopolar cautery was then used to excise a skin flap (skin-muscle medially) within the ellipse marked. Additional hemostasis was obtained with bipolar cautery. The medial fat pad was exposed with sharp dissection through the orbital septum with additional exposure using a rake retractor.  The medial fat pad was teased out with a combination of sharp and careful blunt dissection.  Bipolar cautery was used for hemostasis as needed.  The fat pad was infiltrated with local anesthetic and then excised using a clamp-cut-cautery method.  The blepharoplasty was then closed with several interrupted lid crease reforming 7-0 Prolene sutures. The wound was then closed with a running 7-0 Prolene suture.     Attention was then turned to the contralateral left upper eyelid where the identical   procedure was performed.     The bilateral brows were then infiltrated with 2% lidocaine with epinephrine.     The procedure was then begun on the right side.    A #15 blade was used to incise the skin markings above the brow as outlined.    Bipolar cautery was used for hemostasis.  Monopolar cautery was then used to   dissect more deeply down to the level of the muscular layer.  The browplasty   incision area was  then excised in the premuscular plane, taking care medially   to stay superficial to not violate any of the neurovascular bundles present.    Bipolar cautery was used for hemostasis on the base of the wound.  The edge of   the orbicularis layer was then plicated to the frontalis muscle using   multiple buried interrupted 5-0 Mersilene sutures along with the length of the   incision.  The deep subcutaneous layer was closed with interrupted 5-0 Vicryl   sutures in a buried manner.  The skin was then closed with multiple   interrupted vertical mattress sutures of 6-0 Prolene to provide good skin   eversion and then the length of the wound was then closed with a running 6-0   Prolene suture.      The identical procedure was then performed on the   contralateral side.  The patient tolerated the procedure well.  Antibiotic   ointment was placed on the incisions at the conclusion of the case.  The patient was extubated and brought to the PACU in stable condition.

## 2017-10-11 NOTE — DISCHARGE INSTRUCTIONS
Post Operative Instructions  Dr. Martínez [phone]    -Follow up as scheduled in 1 week. Patient to call office for any questions regarding follow up, date/time - [phone]    -Head of bed elevated for 48 hours    -Cool compresses/Ice packs 20 minutes on, then 20 minutes off for 48 hours while awake. After 48 hours begin heat packs four times a day until follow up appointment with M.D. WHEN APPLYING ICE, LAY FLAT, OTHERWISE ELEVATE HEAD AS INSTRUCTED    -Eye Shield at bedtime    -Erythromycin 0.5% ophthalmic ointment to Right and Left Eyes and to the incisions three times a day        -No bending, straining, stooping or lifting    -For pain medication: Tylenol #3 1-2 tabs PO q4-6 hrs PRN severe pain    -Keep pressure patch clean and dry. Do not remove until follow-up appointment

## 2017-10-31 ENCOUNTER — APPOINTMENT (RX ONLY)
Dept: URBAN - METROPOLITAN AREA CLINIC 31 | Facility: CLINIC | Age: 62
Setting detail: DERMATOLOGY
End: 2017-10-31

## 2017-10-31 DIAGNOSIS — L82.1 OTHER SEBORRHEIC KERATOSIS: ICD-10-CM

## 2017-10-31 DIAGNOSIS — L57.0 ACTINIC KERATOSIS: ICD-10-CM

## 2017-10-31 DIAGNOSIS — D18.0 HEMANGIOMA: ICD-10-CM

## 2017-10-31 DIAGNOSIS — D22 MELANOCYTIC NEVI: ICD-10-CM

## 2017-10-31 DIAGNOSIS — L57.8 OTHER SKIN CHANGES DUE TO CHRONIC EXPOSURE TO NONIONIZING RADIATION: ICD-10-CM

## 2017-10-31 PROBLEM — D23.72 OTHER BENIGN NEOPLASM OF SKIN OF LEFT LOWER LIMB, INCLUDING HIP: Status: ACTIVE | Noted: 2017-10-31

## 2017-10-31 PROBLEM — D18.01 HEMANGIOMA OF SKIN AND SUBCUTANEOUS TISSUE: Status: ACTIVE | Noted: 2017-10-31

## 2017-10-31 PROBLEM — D22.5 MELANOCYTIC NEVI OF TRUNK: Status: ACTIVE | Noted: 2017-10-31

## 2017-10-31 PROCEDURE — ? LIQUID NITROGEN

## 2017-10-31 PROCEDURE — ? COUNSELING

## 2017-10-31 PROCEDURE — 99386 PREV VISIT NEW AGE 40-64: CPT | Mod: 25

## 2017-10-31 PROCEDURE — 17003 DESTRUCT PREMALG LES 2-14: CPT

## 2017-10-31 PROCEDURE — 17000 DESTRUCT PREMALG LESION: CPT

## 2017-10-31 ASSESSMENT — LOCATION SIMPLE DESCRIPTION DERM
LOCATION SIMPLE: NOSE
LOCATION SIMPLE: LEFT ANTERIOR NECK
LOCATION SIMPLE: RIGHT UPPER BACK
LOCATION SIMPLE: RIGHT FOREARM
LOCATION SIMPLE: LEFT UPPER BACK
LOCATION SIMPLE: LEFT FOREARM
LOCATION SIMPLE: LEFT HAND
LOCATION SIMPLE: CHEST
LOCATION SIMPLE: RIGHT CHEEK
LOCATION SIMPLE: RIGHT HAND

## 2017-10-31 ASSESSMENT — LOCATION DETAILED DESCRIPTION DERM
LOCATION DETAILED: LEFT SUPERIOR ANTERIOR NECK
LOCATION DETAILED: RIGHT MID-UPPER BACK
LOCATION DETAILED: NASAL DORSUM
LOCATION DETAILED: LEFT MEDIAL INFERIOR CHEST
LOCATION DETAILED: LEFT ULNAR DORSAL HAND
LOCATION DETAILED: LEFT MEDIAL UPPER BACK
LOCATION DETAILED: RIGHT RADIAL DORSAL HAND
LOCATION DETAILED: NASAL SUPRATIP
LOCATION DETAILED: RIGHT PROXIMAL DORSAL FOREARM
LOCATION DETAILED: LEFT PROXIMAL DORSAL FOREARM
LOCATION DETAILED: RIGHT INFERIOR CENTRAL MALAR CHEEK

## 2017-10-31 ASSESSMENT — LOCATION ZONE DERM
LOCATION ZONE: NOSE
LOCATION ZONE: FACE
LOCATION ZONE: HAND
LOCATION ZONE: NECK
LOCATION ZONE: TRUNK
LOCATION ZONE: ARM

## 2017-10-31 NOTE — PROCEDURE: LIQUID NITROGEN
Detail Level: Detailed
Post-Care Instructions: I reviewed with the patient in detail post-care instructions. Patient is to wear sunprotection, and avoid picking at any of the treated lesions. Pt may apply Vaseline to crusted or scabbing areas.
Consent: The patient's consent was obtained including but not limited to risks of crusting, scabbing, blistering, scarring, darker or lighter pigmentary change, recurrence, incomplete removal and infection.
Duration Of Freeze Thaw-Cycle (Seconds): 15
Render Post-Care Instructions In Note?: no
Number Of Freeze-Thaw Cycles: 1 freeze-thaw cycle

## 2017-11-01 ENCOUNTER — HOSPITAL ENCOUNTER (OUTPATIENT)
Dept: RADIOLOGY | Facility: REHABILITATION | Age: 62
End: 2017-11-01
Attending: PAIN MEDICINE
Payer: COMMERCIAL

## 2017-11-01 ENCOUNTER — HOSPITAL ENCOUNTER (OUTPATIENT)
Dept: PAIN MANAGEMENT | Facility: REHABILITATION | Age: 62
End: 2017-11-01
Attending: PAIN MEDICINE
Payer: COMMERCIAL

## 2017-11-01 VITALS
BODY MASS INDEX: 35.43 KG/M2 | SYSTOLIC BLOOD PRESSURE: 137 MMHG | TEMPERATURE: 97.6 F | DIASTOLIC BLOOD PRESSURE: 84 MMHG | RESPIRATION RATE: 16 BRPM | WEIGHT: 199.96 LBS | OXYGEN SATURATION: 95 % | HEIGHT: 63 IN | HEART RATE: 60 BPM

## 2017-11-01 PROCEDURE — 99153 MOD SED SAME PHYS/QHP EA: CPT

## 2017-11-01 PROCEDURE — 99152 MOD SED SAME PHYS/QHP 5/>YRS: CPT

## 2017-11-01 PROCEDURE — 64636 DESTROY L/S FACET JNT ADDL: CPT

## 2017-11-01 PROCEDURE — 64635 DESTROY LUMB/SAC FACET JNT: CPT

## 2017-11-01 PROCEDURE — 700111 HCHG RX REV CODE 636 W/ 250 OVERRIDE (IP)

## 2017-11-01 RX ORDER — LIDOCAINE HYDROCHLORIDE 10 MG/ML
INJECTION, SOLUTION EPIDURAL; INFILTRATION; INTRACAUDAL; PERINEURAL
Status: COMPLETED
Start: 2017-11-01 | End: 2017-11-01

## 2017-11-01 RX ORDER — BUPIVACAINE HYDROCHLORIDE 2.5 MG/ML
INJECTION, SOLUTION EPIDURAL; INFILTRATION; INTRACAUDAL
Status: COMPLETED
Start: 2017-11-01 | End: 2017-11-01

## 2017-11-01 RX ORDER — ASPIRIN 325 MG
325 TABLET ORAL DAILY
COMMUNITY
End: 2019-03-11

## 2017-11-01 RX ORDER — TRIAMCINOLONE ACETONIDE 40 MG/ML
INJECTION, SUSPENSION INTRA-ARTICULAR; INTRAMUSCULAR
Status: COMPLETED
Start: 2017-11-01 | End: 2017-11-01

## 2017-11-01 RX ORDER — MIDAZOLAM HYDROCHLORIDE 1 MG/ML
INJECTION INTRAMUSCULAR; INTRAVENOUS
Status: COMPLETED
Start: 2017-11-01 | End: 2017-11-01

## 2017-11-01 RX ADMIN — FENTANYL CITRATE 50 MCG: 50 INJECTION, SOLUTION INTRAMUSCULAR; INTRAVENOUS at 14:39

## 2017-11-01 RX ADMIN — TRIAMCINOLONE ACETONIDE 40 MG: 40 INJECTION, SUSPENSION INTRA-ARTICULAR; INTRAMUSCULAR at 14:48

## 2017-11-01 RX ADMIN — LIDOCAINE HYDROCHLORIDE 25 ML: 10 INJECTION, SOLUTION EPIDURAL; INFILTRATION; INTRACAUDAL; PERINEURAL at 14:39

## 2017-11-01 RX ADMIN — FENTANYL CITRATE 50 MCG: 50 INJECTION, SOLUTION INTRAMUSCULAR; INTRAVENOUS at 14:43

## 2017-11-01 RX ADMIN — MIDAZOLAM HYDROCHLORIDE 2 MG: 1 INJECTION, SOLUTION INTRAMUSCULAR; INTRAVENOUS at 14:35

## 2017-11-01 RX ADMIN — BUPIVACAINE HYDROCHLORIDE 3 ML: 2.5 INJECTION, SOLUTION EPIDURAL; INFILTRATION; INTRACAUDAL; PERINEURAL at 14:48

## 2017-11-01 ASSESSMENT — PAIN SCALES - GENERAL
PAINLEVEL_OUTOF10: 3
PAINLEVEL_OUTOF10: 0

## 2017-11-01 NOTE — OP REPORT
Bipolar Radiofrequency Ablation of Lumbar and Sacroiliac Joint Nerves.  Surgeon: Leticia Edgar M.D.  ASSISTANT:  None  Site:  [facility]   Duration of Anesthesia:27 minutes    PREOPERATIVE DIAGNOSIS:  Lumbosacral spondylosis with Sacroiliitis.    POSTOPERATIVE DIAGNOSIS:  Lumbosacral spondylosis with Sacroiliitis.    PROCEDURE PERFORMED:  1. Bipolar Radiofrequency ablation of the L4 medial branch nerve on the Right side.  2. Additional levels, L5 dorsal rami and lateral branches of the S1, S2 and S3 on the Right side.  3. Fluoroscopy for precise needle placement.    ANESTHESIA:  Local infiltration with monitored anesthesia care conscious sedation.    MONITORS:   Automatic blood pressure cuff and pulse oximetry.    INDICATIONS:  2 consecutive successful blocks with 80% improvement of pain after the blocks.  REVIEW OF SYSTEMS:  Negative for fever, chills, chest pain, SOB, bleeding abnormalities, nausea, vomiting, diarrhea, worsening edema, or new rashes.    FOCUSED PHYSICAL EXAMINATION:  The patient is awake, alert and oriented, and is in no acute distress.  Vital signs are stable.  The patient is afebrile.  The rest of the PE is essentially unchanged from the patient's recent visit to our office.    We explained the procedure to the patient including the risks, benefits and alternatives to the procedure.  The patient verbalized understanding and was willing to proceed.    PROCEDURE IN DETAIL:  An informed consent was obtained.  The patient was taken to the procedure room and was positively identified by the staff and the attending physician.  The patient was positioned prone on the procedure bed.  Vital signs were monitored as above and remained stable throughout the procedure.  The skin was prepped and draped in the standard sterile fashion.  A surgical pause (time-out) was performed and was agreed upon by the members of the team. A fluoroscopic view of the lumbar spine was obtained, and the area of interest was  identified.  The skin was anesthetized using 1% lidocaine and 25-gauge 1-1/2-inch needle.    After that, one 16-gauge 10-cm RF cannulas with 10-mm active tip was advanced on to juncture L5 superior articular process and L5 transverse process on the right. Full contact with the bone was established. Additional needles, 2 needles at each level were placed at the juncture of S1 SAP and Sacral ala on the right and lateral to right S1, S2, and S3 foramen by using epsilon (Each level was stimulated for sensory fibers at 50 Hz, and the patient's pain was reproduced at approximately 1.2 V.)  (The motor fiber recruitment was ruled out by stimulation at 2 Hz in the range between 1.5 to 2.5 V.)  Prior to lesioning, each nerve was anesthetized with 0.5 mL of Lidocaine 1%. For L4 medial branch and L5 dorsal rami one lesion per level was performed and after that at each level, we performed three lesions at 90 degrees centigrade, 105 seconds in duration at 5:30, 4:00 and 2:30 o'clock.   Before removing the radiofrequency cannulae, each site was injected with 1 ml of the mixture containing 40 mg Kenalog and 4 cc of bupivacaine 0.25%.    All needles were withdrawn.  The patient tolerated the procedure well.  The patient was transferred in stable condition to the recovery room.    COMPLICATIONS:  None.    PAIN SCORE BEFORE THE PROCEDURE:     PAIN SCORE AFTER THE PROCEDURE:      DISPOSITON:  1. Discharge to home when the discharge criteria are met.  2. Follow up in two weeks in the Pain Clinic.  3. (The patient will resume their medications.

## 2017-11-01 NOTE — PROGRESS NOTES
Reviewed Plan of Care with patient. Confirmed that he/she has stopped all blood thinning medications since 10/29/17.   Confirmed that she has a . Reviewed home care instructions with patient prior to procedure. All questions and concerns addressed.   Dr Herve vazquez.

## 2017-11-01 NOTE — PROGRESS NOTES
Pt positioned prone by RN,CSt and Xray tech. Arms hanging off the table, hands resting on stool under table. . Pillow placed under feet and lower legs by CST. Grounding pad placed on left calf by CST.

## 2017-11-15 ENCOUNTER — OFFICE VISIT (OUTPATIENT)
Dept: MEDICAL GROUP | Facility: MEDICAL CENTER | Age: 62
End: 2017-11-15
Payer: COMMERCIAL

## 2017-11-15 VITALS
HEART RATE: 53 BPM | OXYGEN SATURATION: 95 % | TEMPERATURE: 97 F | WEIGHT: 200 LBS | SYSTOLIC BLOOD PRESSURE: 104 MMHG | BODY MASS INDEX: 35.44 KG/M2 | HEIGHT: 63 IN | DIASTOLIC BLOOD PRESSURE: 72 MMHG

## 2017-11-15 DIAGNOSIS — G43.909 MIGRAINE SYNDROME: ICD-10-CM

## 2017-11-15 DIAGNOSIS — F45.8 BRUXISM (TEETH GRINDING): ICD-10-CM

## 2017-11-15 DIAGNOSIS — F41.9 ANXIETY AND DEPRESSION: ICD-10-CM

## 2017-11-15 DIAGNOSIS — M25.551 RIGHT HIP PAIN: ICD-10-CM

## 2017-11-15 DIAGNOSIS — E55.9 VITAMIN D DEFICIENCY: ICD-10-CM

## 2017-11-15 DIAGNOSIS — R73.01 IFG (IMPAIRED FASTING GLUCOSE): ICD-10-CM

## 2017-11-15 DIAGNOSIS — F32.A ANXIETY AND DEPRESSION: ICD-10-CM

## 2017-11-15 DIAGNOSIS — E78.5 HYPERLIPIDEMIA LDL GOAL <100: ICD-10-CM

## 2017-11-15 DIAGNOSIS — E66.9 OBESITY (BMI 35.0-39.9 WITHOUT COMORBIDITY): ICD-10-CM

## 2017-11-15 PROCEDURE — 99214 OFFICE O/P EST MOD 30 MIN: CPT | Performed by: NURSE PRACTITIONER

## 2017-11-15 RX ORDER — BUTALBITAL, ACETAMINOPHEN AND CAFFEINE 50; 325; 40 MG/1; MG/1; MG/1
1 TABLET ORAL EVERY 4 HOURS PRN
Qty: 30 TAB | Refills: 0 | Status: SHIPPED | OUTPATIENT
Start: 2017-11-15 | End: 2018-04-10 | Stop reason: SDUPTHER

## 2017-11-15 ASSESSMENT — PATIENT HEALTH QUESTIONNAIRE - PHQ9: CLINICAL INTERPRETATION OF PHQ2 SCORE: 0

## 2017-11-15 NOTE — PROGRESS NOTES
Subjective:     Sona Thakur is a 61 y.o. female who presents with   Chief Complaint   Patient presents with   • Results   .    HPI:     Seen in f/u for depression and anxiety.  She was started on zoloft several months ago.  Then inc to 100 mg in sept.  Now feeling better.    Her dog  last nite.  She is still having left ear and jaw pain.   That started about 6-8 months ago.  Tried mouth guards.  Tried steroid shots.  Not helping.  There is clicking with opening mouth.  She has seen dentist.  Mouth guard will help if she wears during the day it helps.  She is still having mild rt hip pain.  The shots did not help a lot.  She has also had an ablation 2 weeks ago.  MRI shows taran SI joint and lumbar OA.    She is due lab.  Her last LP in  was not at goal.  Her last a1c in  was 6.0.  Last vitamin d was low  Normal.  Needs fiorcet refill for migraines.      Patient Active Problem List    Diagnosis Date Noted   • Obesity (BMI 35.0-39.9 without comorbidity) ([facility]) 11/15/2017   • Nasal step visual field defect of both eyes 10/11/2017   • Family history of breast cancer 09/15/2017   • Stress 09/15/2017   • Breast nodule 09/15/2017   • Obesity (BMI 30-39.9) 05/15/2017   • Nuclear cataract 2017   • Nuclear sclerotic cataract of left eye 2017   • Cataract    • Glaucoma    • Atrial fibrillation (CMS-HCC) 02/15/2013   • GERD (gastroesophageal reflux disease) 2012   • Allergic rhinitis 2012   • Hypertension 2012   • Preventative health care 2010       Current medicines (including changes today)  Current Outpatient Prescriptions   Medication Sig Dispense Refill   • acetaminophen/caffeine/butalbital 325-40-50 mg (FIORICET) -40 MG Tab Take 1 Tab by mouth every four hours as needed for Headache. 30 Tab 0   • aspirin (ASA) 325 MG Tab Take 325 mg by mouth every day.     • ascorbic acid (ASCORBIC ACID) 500 MG Tab Take 500 mg by mouth every day.     • calcium-vitamin D (CALCIUM  "500 + D) 500-125 MG-UNIT Tab Take 1 Tab by mouth every day.     • omeprazole (PRILOSEC) 20 MG delayed-release capsule TAKE ONE CAPSULE BY MOUTH IN THE MORNING AND TWO IN THE EVENING 90 Cap 1   • KLOR-CON M10 10 MEQ Tab CR TAKE ONE TABLET BY MOUTH IN THE EVENING ON  MONDAY,  WEDNESDAY,  FRIDAY  AND  SATURDAY 30 Tab 3   • sertraline (ZOLOFT) 100 MG Tab Take 1 Tab by mouth every day. (Patient taking differently: Take 100 mg by mouth every evening.) 30 Tab 1   • chlorthalidone (HYGROTON) 25 MG Tab TAKE ONE TABLET BY MOUTH ONCE DAILY 90 Tab 3   • losartan (COZAAR) 50 MG Tab TAKE ONE TABLET BY MOUTH ONCE DAILY 90 Tab 3   • cholestyramine (QUESTRAN) 4 G packet Take 4 g by mouth 2 times a day. 60 Each 3   • Omega-3 Fatty Acids (FISH OIL) 1000 MG Cap capsule Take 1 Cap by mouth 3 times a day, with meals. 90 Cap 0   • Cholecalciferol (VITAMIN D) 2000 UNITS Cap Take 1 Cap by mouth every day. 30 Cap 0   • acetaminophen (TYLENOL) 325 MG Tab Take 2 Tabs by mouth every four hours as needed. 30 Tab 0   • timolol (TIMOPTIC) 0.5 % SOLN Place 1 Drop in both eyes 2 times a day.     • travoprost (TRAVOPROST) 0.004 % SOLN Place 1 Drop in left eye every evening.       No current facility-administered medications for this visit.        Allergies   Allergen Reactions   • Allegra      \"Gave me a bad headache   • Lipitor [Atorvastatin Calcium]      \"Hurts my bones\"   • Pravastatin      Back, legs and hip pain       ROS  Constitutional: Negative. Negative for fever, chills, weight loss, malaise/fatigue and diaphoresis.   HENT: Negative. Negative for hearing loss, ear pain, nosebleeds, congestion, sore throat, neck pain, tinnitus and ear discharge.   Respiratory: Negative. Negative for cough, hemoptysis, sputum production, shortness of breath, wheezing and stridor.   Cardiovascular: Negative. Negative for chest pain, palpitations, orthopnea, claudication, leg swelling and PND.   Gastrointestinal: Denies nausea, vomiting, diarrhea, constipation, " "heartburn, melena or hematochezia.  Genitourinary: Denies dysuria, hematuria, urinary incontinence, frequency or urgency.        Objective:     Blood pressure 104/72, pulse (!) 53, temperature 36.1 °C (97 °F), height 1.6 m (5' 3\"), weight 90.7 kg (200 lb), SpO2 95 %, not currently breastfeeding. Body mass index is 35.43 kg/m².    Physical Exam:  Physical Exam   Vitals reviewed.  Constitutional: oriented to person, place, and time. appears well-developed and well-nourished. No distress.   HENT:  Head: Normocephalic and atraumatic. Right Ear: External ear normal. Left Ear: External ear normal. Nose: Nose normal. Mouth/Throat: Oropharynx is clear and moist. No oropharyngeal exudate.  taran tm wnl.  Eyes: Right eye exhibits no discharge. Left eye exhibits no discharge. No scleral icterus.  Neck: No JVD present.  Cardiovascular: Normal rate, regular rhythm, normal heart sounds and intact distal pulses.  Exam reveals no gallop and no friction rub.  No murmur heard.  No carotid bruits.   Pulmonary/Chest: Effort normal and breath sounds normal. No stridor. No respiratory distress. no wheezes or rales. exhibits no tenderness.   Musculoskeletal: Normal range of motion. exhibits no edema. taran pedal pulses 2+.  Lymphadenopathy: no cervical or supraclavicular adenopathy.   Neurological: alert and oriented to person, place, and time. exhibits normal muscle tone. Coordination normal.   Skin: Skin is warm and dry. no diaphoresis.   Psychiatric: normal mood and affect. behavior is normal.        Assessment and Plan:     The following treatment plan was discussed:    1. Anxiety and depression      now controlled on zoloft 100 mg daily   2. Bruxism (teeth grinding)      wear mouth guard all the time.     3. Migraine syndrome  acetaminophen/caffeine/butalbital 325-40-50 mg (FIORICET) -40 MG Tab    refill fiorcet   4. Right hip pain      continue f/u with pain mgmt - Nairizi   5. IFG (impaired fasting glucose)  CMP14+LP    HGB A1C " WITH EAG ESTIMATION    MICROALB/CREAT RATIO RAND. UR    do lab and f/u for review   6. Hyperlipidemia LDL goal <100  CMP14+LP    do lab and f/u   7. Vitamin D deficiency  VITAMIN D 25-HYDROXY   8. Obesity (BMI 35.0-39.9 without comorbidity)  Patient identified as having weight management issue.  Appropriate orders and counseling given.         Followup: No Follow-up on file.

## 2017-11-22 DIAGNOSIS — F43.9 STRESS: ICD-10-CM

## 2017-11-22 RX ORDER — SERTRALINE HYDROCHLORIDE 100 MG/1
TABLET, FILM COATED ORAL
Qty: 90 TAB | Refills: 1 | Status: SHIPPED | OUTPATIENT
Start: 2017-11-22 | End: 2018-05-18 | Stop reason: SDUPTHER

## 2018-01-08 DIAGNOSIS — E78.5 HYPERLIPIDEMIA LDL GOAL <100: ICD-10-CM

## 2018-01-08 RX ORDER — OMEPRAZOLE 20 MG/1
CAPSULE, DELAYED RELEASE ORAL
Qty: 90 CAP | Refills: 0 | Status: SHIPPED | OUTPATIENT
Start: 2018-01-08 | End: 2018-02-16 | Stop reason: SDUPTHER

## 2018-01-08 RX ORDER — CHOLESTYRAMINE 4 G/9G
POWDER, FOR SUSPENSION ORAL
Qty: 60 EACH | Refills: 0 | Status: SHIPPED | OUTPATIENT
Start: 2018-01-08 | End: 2018-02-26 | Stop reason: SDUPTHER

## 2018-02-19 RX ORDER — OMEPRAZOLE 20 MG/1
CAPSULE, DELAYED RELEASE ORAL
Qty: 90 CAP | Refills: 0 | Status: SHIPPED | OUTPATIENT
Start: 2018-02-19 | End: 2018-03-27 | Stop reason: SDUPTHER

## 2018-02-19 RX ORDER — OMEPRAZOLE 20 MG/1
CAPSULE, DELAYED RELEASE ORAL
Qty: 90 CAP | Refills: 0 | OUTPATIENT
Start: 2018-02-19

## 2018-02-26 DIAGNOSIS — E78.5 HYPERLIPIDEMIA LDL GOAL <100: ICD-10-CM

## 2018-02-26 RX ORDER — CHOLESTYRAMINE 4 G/9G
POWDER, FOR SUSPENSION ORAL
Qty: 60 EACH | Refills: 0 | Status: SHIPPED | OUTPATIENT
Start: 2018-02-26 | End: 2018-04-01 | Stop reason: SDUPTHER

## 2018-03-15 ENCOUNTER — TELEPHONE (OUTPATIENT)
Dept: MEDICAL GROUP | Facility: MEDICAL CENTER | Age: 63
End: 2018-03-15

## 2018-03-15 LAB
25(OH)D3+25(OH)D2 SERPL-MCNC: 42.5 NG/ML (ref 30–100)
ALBUMIN SERPL-MCNC: 4.1 G/DL (ref 3.6–4.8)
ALBUMIN/CREAT UR: <6.8 MG/G CREAT (ref 0–30)
ALBUMIN/GLOB SERPL: 1.5 {RATIO} (ref 1.2–2.2)
ALP SERPL-CCNC: 92 IU/L (ref 39–117)
ALT SERPL-CCNC: 30 IU/L (ref 0–32)
AST SERPL-CCNC: 29 IU/L (ref 0–40)
BILIRUB SERPL-MCNC: 0.4 MG/DL (ref 0–1.2)
BUN SERPL-MCNC: 12 MG/DL (ref 8–27)
BUN/CREAT SERPL: 22 (ref 12–28)
CALCIUM SERPL-MCNC: 9.3 MG/DL (ref 8.7–10.3)
CHLORIDE SERPL-SCNC: 99 MMOL/L (ref 96–106)
CHOLEST SERPL-MCNC: 232 MG/DL (ref 100–199)
CHOLEST/HDLC SERPL: 4 RATIO UNITS (ref 0–4.4)
CO2 SERPL-SCNC: 25 MMOL/L (ref 18–29)
CREAT SERPL-MCNC: 0.55 MG/DL (ref 0.57–1)
CREAT UR-MCNC: 44.2 MG/DL
EST. AVERAGE GLUCOSE BLD GHB EST-MCNC: 117 MG/DL
GFR SERPLBLD CREATININE-BSD FMLA CKD-EPI: 100 ML/MIN/1.73
GFR SERPLBLD CREATININE-BSD FMLA CKD-EPI: 115 ML/MIN/1.73
GLOBULIN SER CALC-MCNC: 2.7 G/DL (ref 1.5–4.5)
GLUCOSE SERPL-MCNC: 84 MG/DL (ref 65–99)
HBA1C MFR BLD: 5.7 % (ref 4.8–5.6)
HDLC SERPL-MCNC: 58 MG/DL
LABORATORY COMMENT REPORT: ABNORMAL
LDLC SERPL CALC-MCNC: 138 MG/DL (ref 0–99)
MICROALBUMIN UR-MCNC: <3 UG/ML
POTASSIUM SERPL-SCNC: 3.6 MMOL/L (ref 3.5–5.2)
PROT SERPL-MCNC: 6.8 G/DL (ref 6–8.5)
SODIUM SERPL-SCNC: 141 MMOL/L (ref 134–144)
TRIGL SERPL-MCNC: 178 MG/DL (ref 0–149)
VLDLC SERPL CALC-MCNC: 36 MG/DL (ref 5–40)

## 2018-03-15 NOTE — TELEPHONE ENCOUNTER
----- Message from JOSS Fountain sent at 3/15/2018  3:50 PM PDT -----  Please have pt set appointment to review and discuss treatment for labs.

## 2018-03-27 RX ORDER — OMEPRAZOLE 20 MG/1
CAPSULE, DELAYED RELEASE ORAL
Qty: 90 CAP | Refills: 0 | Status: SHIPPED | OUTPATIENT
Start: 2018-03-27 | End: 2018-04-29 | Stop reason: SDUPTHER

## 2018-04-01 DIAGNOSIS — E78.5 HYPERLIPIDEMIA LDL GOAL <100: ICD-10-CM

## 2018-04-02 ENCOUNTER — HOSPITAL ENCOUNTER (OUTPATIENT)
Dept: RADIOLOGY | Facility: MEDICAL CENTER | Age: 63
End: 2018-04-02
Attending: NURSE PRACTITIONER
Payer: COMMERCIAL

## 2018-04-02 DIAGNOSIS — Z12.39 SCREENING BREAST EXAMINATION: ICD-10-CM

## 2018-04-02 PROCEDURE — 77063 BREAST TOMOSYNTHESIS BI: CPT

## 2018-04-02 RX ORDER — CHOLESTYRAMINE 4 G/9G
POWDER, FOR SUSPENSION ORAL
Qty: 60 EACH | Refills: 0 | Status: SHIPPED | OUTPATIENT
Start: 2018-04-02 | End: 2018-05-18 | Stop reason: SDUPTHER

## 2018-04-10 ENCOUNTER — OFFICE VISIT (OUTPATIENT)
Dept: MEDICAL GROUP | Facility: MEDICAL CENTER | Age: 63
End: 2018-04-10
Payer: COMMERCIAL

## 2018-04-10 VITALS
BODY MASS INDEX: 34.91 KG/M2 | TEMPERATURE: 97.1 F | HEIGHT: 63 IN | DIASTOLIC BLOOD PRESSURE: 72 MMHG | WEIGHT: 197 LBS | OXYGEN SATURATION: 92 % | SYSTOLIC BLOOD PRESSURE: 118 MMHG | HEART RATE: 62 BPM

## 2018-04-10 DIAGNOSIS — M54.41 CHRONIC RIGHT-SIDED LOW BACK PAIN WITH RIGHT-SIDED SCIATICA: ICD-10-CM

## 2018-04-10 DIAGNOSIS — G89.29 CHRONIC RIGHT-SIDED LOW BACK PAIN WITH RIGHT-SIDED SCIATICA: ICD-10-CM

## 2018-04-10 DIAGNOSIS — E78.5 HYPERLIPIDEMIA LDL GOAL <100: ICD-10-CM

## 2018-04-10 DIAGNOSIS — R73.01 IFG (IMPAIRED FASTING GLUCOSE): ICD-10-CM

## 2018-04-10 DIAGNOSIS — G43.909 MIGRAINE SYNDROME: ICD-10-CM

## 2018-04-10 DIAGNOSIS — E87.6 HYPOKALEMIA: ICD-10-CM

## 2018-04-10 PROCEDURE — 99214 OFFICE O/P EST MOD 30 MIN: CPT | Performed by: NURSE PRACTITIONER

## 2018-04-10 RX ORDER — BUTALBITAL, ACETAMINOPHEN AND CAFFEINE 50; 325; 40 MG/1; MG/1; MG/1
1 TABLET ORAL EVERY 4 HOURS PRN
Qty: 30 TAB | Refills: 1 | Status: SHIPPED | OUTPATIENT
Start: 2018-04-10 | End: 2019-10-17 | Stop reason: SDUPTHER

## 2018-04-10 RX ORDER — POTASSIUM CHLORIDE 750 MG/1
10 TABLET, EXTENDED RELEASE ORAL DAILY
Qty: 90 TAB | Refills: 3 | Status: SHIPPED | OUTPATIENT
Start: 2018-04-10 | End: 2019-05-13 | Stop reason: SDUPTHER

## 2018-04-10 NOTE — PROGRESS NOTES
Subjective:     Sona Thakur is a 62 y.o. female who presents with hypokalemia.    HPI:   Seen in f/u for hypokalemia.  [facility]  They did lab in jan andh er K+ was low.  They told her to take k+ daily. That is what she is going.   She is on fiorcet for her migraines.  She is going on a month long trip in may.  Needs refills.  Her last refill was in nov.    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   She is still having some lower back pain and rt SI joint pain.  She has seen [facility] for the pain.  Had an ablation. It didn't help at all.  She has bought a new mattress.  That has helped.  Using advil and tyl for pain.   Reviewed lab with pt.  D, alb/cr ratio, CMP is wnl.   a1c is down from 6.0 in 2016 to 5.7.    LP shows trg are up.  HDL is at goal. LDL is high at 138.  Goal is <100.  It was 104 last time. S heis on questran.  Taking approp.  Intolerant of statins.  No watching her diet or exercising regulary.      Patient Active Problem List    Diagnosis Date Noted   • Obesity (BMI 35.0-39.9 without comorbidity) ([facility]) 11/15/2017   • Nasal step visual field defect of both eyes 10/11/2017   • Family history of breast cancer 09/15/2017   • Stress 09/15/2017   • Breast nodule 09/15/2017   • Obesity (BMI 30-39.9) 05/15/2017   • Nuclear cataract 02/14/2017   • Nuclear sclerotic cataract of left eye 01/24/2017   • Cataract    • Glaucoma    • Atrial fibrillation (CMS-HCC) 02/15/2013   • GERD (gastroesophageal reflux disease) 05/22/2012   • Allergic rhinitis 05/22/2012   • Hypertension 05/22/2012   • Preventative health care 11/09/2010       Current medicines (including changes today)  Current Outpatient Prescriptions   Medication Sig Dispense Refill   • potassium chloride SA (KLOR-CON M10) 10 MEQ Tab CR Take 1 Tab by mouth every day. 90 Tab 3   • acetaminophen/caffeine/butalbital 325-40-50 mg (FIORICET) -40 MG Tab Take 1 Tab by mouth every four hours as  "needed for Headache. 30 Tab 1   • cholestyramine (QUESTRAN) 4 g packet DISSOLVE & TAKE 1 PACKET BY MOUTH TWICE DAILY 60 Each 0   • omeprazole (PRILOSEC) 20 MG delayed-release capsule TAKE 1 CAPSULE BY MOUTH ONCE DAILY IN  THE  MORNING  AND  TWO  CAPSULES  IN  THE  EVENING 90 Cap 0   • sertraline (ZOLOFT) 100 MG Tab TAKE ONE TABLET BY MOUTH ONCE DAILY 90 Tab 1   • aspirin (ASA) 325 MG Tab Take 325 mg by mouth every day.     • ascorbic acid (ASCORBIC ACID) 500 MG Tab Take 500 mg by mouth every day.     • calcium-vitamin D (CALCIUM 500 + D) 500-125 MG-UNIT Tab Take 1 Tab by mouth every day.     • chlorthalidone (HYGROTON) 25 MG Tab TAKE ONE TABLET BY MOUTH ONCE DAILY 90 Tab 3   • losartan (COZAAR) 50 MG Tab TAKE ONE TABLET BY MOUTH ONCE DAILY 90 Tab 3   • Omega-3 Fatty Acids (FISH OIL) 1000 MG Cap capsule Take 1 Cap by mouth 3 times a day, with meals. 90 Cap 0   • Cholecalciferol (VITAMIN D) 2000 UNITS Cap Take 1 Cap by mouth every day. 30 Cap 0   • acetaminophen (TYLENOL) 325 MG Tab Take 2 Tabs by mouth every four hours as needed. 30 Tab 0   • timolol (TIMOPTIC) 0.5 % SOLN Place 1 Drop in both eyes 2 times a day.     • travoprost (TRAVOPROST) 0.004 % SOLN Place 1 Drop in left eye every evening.       No current facility-administered medications for this visit.        Allergies   Allergen Reactions   • Allegra      \"Gave me a bad headache   • Lipitor [Atorvastatin Calcium]      \"Hurts my bones\"   • Pravastatin      Back, legs and hip pain       ROS  Constitutional: Negative. Negative for fever, chills, weight loss, malaise/fatigue and diaphoresis.   HENT: Negative. Negative for hearing loss, ear pain, nosebleeds, congestion, sore throat, neck pain, tinnitus and ear discharge.   Respiratory: Negative. Negative for cough, hemoptysis, sputum production, shortness of breath, wheezing and stridor.   Cardiovascular: Negative. Negative for chest pain, palpitations, orthopnea, claudication, leg swelling and PND. " "  Gastrointestinal: Denies nausea, vomiting, diarrhea, constipation, heartburn, melena or hematochezia.  Genitourinary: Denies dysuria, hematuria, urinary incontinence, frequency or urgency.        Objective:     Blood pressure 118/72, pulse 62, temperature 36.2 °C (97.1 °F), height 1.6 m (5' 3\"), weight 89.4 kg (197 lb), SpO2 92 %, not currently breastfeeding. Body mass index is 34.9 kg/m².    Physical Exam:  Vitals reviewed.  Constitutional: Oriented to person, place, and time. appears well-developed and well-nourished. No distress.   Cardiovascular: Normal rate, regular rhythm, normal heart sounds and intact distal pulses. Exam reveals no gallop and no friction rub. No murmur heard. No carotid bruits.   Pulmonary/Chest: Effort normal and breath sounds normal. No stridor. No respiratory distress. no wheezes or rales. exhibits no tenderness.   Musculoskeletal: Normal range of motion. exhibits no edema. taran pedal pulses 2+.  Lymphadenopathy: No cervical or supraclavicular adenopathy.   Neurological: Alert and oriented to person, place, and time. exhibits normal muscle tone.  Skin: Skin is warm and dry. No diaphoresis.   Psychiatric: Normal mood and affect. Behavior is normal.      Assessment and Plan:     The following treatment plan was discussed:    1. IFG (impaired fasting glucose)      improved from last time.  improve low carb die tand inc exercise.  f/u 6 months.  call for lab slip   2. Hypokalemia  potassium chloride SA (KLOR-CON M10) 10 MEQ Tab CR    take k+ daily  refilled med   3. Migraine syndrome  acetaminophen/caffeine/butalbital 325-40-50 mg (FIORICET) -40 MG Tab    refill fiorcet   4. Hyperlipidemia LDL goal <100      continue questran.  recheck 6 months.  call for lab slip  not as good as last time.  improve healthy diet and inc exercise   5. Chronic right-sided low back pain with right-sided sciatica      continue otc meds and diet.          Followup: Return in about 6 months (around " 10/10/2018).

## 2018-04-29 RX ORDER — OMEPRAZOLE 20 MG/1
CAPSULE, DELAYED RELEASE ORAL
Qty: 90 CAP | Refills: 2 | Status: SHIPPED | OUTPATIENT
Start: 2018-04-29 | End: 2018-08-02 | Stop reason: SDUPTHER

## 2018-05-14 ENCOUNTER — OFFICE VISIT (OUTPATIENT)
Dept: CARDIOLOGY | Facility: MEDICAL CENTER | Age: 63
End: 2018-05-14
Payer: COMMERCIAL

## 2018-05-14 VITALS
WEIGHT: 195 LBS | HEART RATE: 67 BPM | BODY MASS INDEX: 34.55 KG/M2 | OXYGEN SATURATION: 95 % | SYSTOLIC BLOOD PRESSURE: 116 MMHG | DIASTOLIC BLOOD PRESSURE: 72 MMHG | HEIGHT: 63 IN

## 2018-05-14 DIAGNOSIS — E66.9 OBESITY (BMI 35.0-39.9 WITHOUT COMORBIDITY): ICD-10-CM

## 2018-05-14 DIAGNOSIS — I10 ESSENTIAL HYPERTENSION: ICD-10-CM

## 2018-05-14 DIAGNOSIS — R73.01 IMPAIRED FASTING GLUCOSE: ICD-10-CM

## 2018-05-14 DIAGNOSIS — I48.0 PAROXYSMAL ATRIAL FIBRILLATION (HCC): ICD-10-CM

## 2018-05-14 LAB — EKG IMPRESSION: NORMAL

## 2018-05-14 PROCEDURE — 99214 OFFICE O/P EST MOD 30 MIN: CPT | Performed by: INTERNAL MEDICINE

## 2018-05-14 PROCEDURE — 93000 ELECTROCARDIOGRAM COMPLETE: CPT | Performed by: INTERNAL MEDICINE

## 2018-05-14 NOTE — PROGRESS NOTES
"Arrhythmia Clinic Note (Established patient)    DOS: 5/14/2018    Chief complaint/Reason for consult: F/u pAF    Interval History:  Patient is a 61 yo F with history of PAF s/p x 2 ablation, last one in 2015, with my partner Carter Spencer. She had been unable to tolerate OAC with xarelto due to excessive bleeding and PUD. She has been on  mg qdaily since. She does still feel like she is going into AF she says about 5-10 minutes a couple of times a week.    ROS (+ highlighted in red):  Constitutional: Fevers/chills/fatigue/weightloss  Respiratory: Shortness of breath/cough  Cardiovascular: Chest pain/palpitations/edema/orthopnea/syncope    Past Medical History:   Diagnosis Date   • Allergy    • Anesthesia     nausea and vomitting   • Arthritis     osteoarthritis, right knee, left hand   • Atrial fibrillation (HCC) 2/15/2013   • GERD (gastroesophageal reflux disease)    • Glaucoma     left eye   • Heart burn    • High cholesterol    • Hypertension    • Unspecified cataract     early stages       Allergies   Allergen Reactions   • Allegra      \"Gave me a bad headache   • Lipitor [Atorvastatin Calcium]      \"Hurts my bones\"   • Pravastatin      Back, legs and hip pain       Current Outpatient Prescriptions   Medication Sig Dispense Refill   • omeprazole (PRILOSEC) 20 MG delayed-release capsule TAKE 1 CAPSULE BY MOUTH ONCE DAILY IN THE MORNING AND TAKE 2 CAPSULES ONCE DAILY IN THE EVENING 90 Cap 2   • potassium chloride SA (KLOR-CON M10) 10 MEQ Tab CR Take 1 Tab by mouth every day. 90 Tab 3   • cholestyramine (QUESTRAN) 4 g packet DISSOLVE & TAKE 1 PACKET BY MOUTH TWICE DAILY 60 Each 0   • sertraline (ZOLOFT) 100 MG Tab TAKE ONE TABLET BY MOUTH ONCE DAILY 90 Tab 1   • aspirin (ASA) 325 MG Tab Take 325 mg by mouth every day.     • ascorbic acid (ASCORBIC ACID) 500 MG Tab Take 500 mg by mouth every day.     • calcium-vitamin D (CALCIUM 500 + D) 500-125 MG-UNIT Tab Take 1 Tab by mouth every day.     • chlorthalidone " "(HYGROTON) 25 MG Tab TAKE ONE TABLET BY MOUTH ONCE DAILY 90 Tab 3   • losartan (COZAAR) 50 MG Tab TAKE ONE TABLET BY MOUTH ONCE DAILY 90 Tab 3   • Omega-3 Fatty Acids (FISH OIL) 1000 MG Cap capsule Take 1 Cap by mouth 3 times a day, with meals. 90 Cap 0   • Cholecalciferol (VITAMIN D) 2000 UNITS Cap Take 1 Cap by mouth every day. (Patient taking differently: Take 2,000 Units by mouth 2 Times a Day.) 30 Cap 0   • acetaminophen (TYLENOL) 325 MG Tab Take 2 Tabs by mouth every four hours as needed. 30 Tab 0   • timolol (TIMOPTIC) 0.5 % SOLN Place 1 Drop in both eyes 2 times a day.     • travoprost (TRAVOPROST) 0.004 % SOLN Place 1 Drop in left eye every evening.     • acetaminophen/caffeine/butalbital 325-40-50 mg (FIORICET) -40 MG Tab Take 1 Tab by mouth every four hours as needed for Headache. 30 Tab 1     No current facility-administered medications for this visit.        Physical Exam:  Vitals:    05/14/18 1334   BP: 116/72   Pulse: 67   SpO2: 95%   Weight: 88.5 kg (195 lb)   Height: 1.6 m (5' 2.99\")     General appearance: NAD, conversant   Eyes: anicteric sclerae, moist conjunctivae; no lid-lag; PERRLA  HENT: Atraumatic; oropharynx clear with moist mucous membranes and no mucosal ulcerations; normal hard and soft palate  Neck: Trachea midline; FROM, supple, no thyromegaly or lymphadenopathy  Lungs: CTA, with normal respiratory effort and no intercostal retractions  CV: RRR, no MRGs, no JVD  Abdomen: Soft, non-tender; no masses or HSM  Extremities: No peripheral edema or extremity lymphadenopathy  Skin: Normal temperature, turgor and texture; no rash, ulcers or subcutaneous nodules  Psych: Appropriate affect, alert and oriented to person, place and time    Data:  Labs reviewed    Prior echo/stress reviewed:  Preserved LV systolic function    EKG interpreted by me:  NSR    Impression/Plan:  1. Paroxysmal atrial fibrillation (HCC)  EKG    ZIO PATCH MONITOR   2. Obesity (BMI 35.0-39.9 without comorbidity) (HCC)  "    3. Essential hypertension     4. Impaired fasting glucose       -She does still report 5-10 minutes of palpitations a couple of times a week  -I would like to quantify her AF burden  -If still having significant episodes > 6-10 minutes we should revisit the anticoagulation issue  -She has HTN and DM that is diet controlled for now, CHADSVasc is 3, soon to be 4 once she turns 64  -She may be a candidate for ANDRES closure if significant AF burden    Norbert Richardson MD

## 2018-05-21 ENCOUNTER — APPOINTMENT (RX ONLY)
Dept: URBAN - METROPOLITAN AREA CLINIC 31 | Facility: CLINIC | Age: 63
Setting detail: DERMATOLOGY
End: 2018-05-21

## 2018-05-21 DIAGNOSIS — L82.1 OTHER SEBORRHEIC KERATOSIS: ICD-10-CM

## 2018-05-21 DIAGNOSIS — D22 MELANOCYTIC NEVI: ICD-10-CM

## 2018-05-21 DIAGNOSIS — L57.0 ACTINIC KERATOSIS: ICD-10-CM

## 2018-05-21 PROBLEM — D22.62 MELANOCYTIC NEVI OF LEFT UPPER LIMB, INCLUDING SHOULDER: Status: ACTIVE | Noted: 2018-05-21

## 2018-05-21 PROBLEM — D22.4 MELANOCYTIC NEVI OF SCALP AND NECK: Status: ACTIVE | Noted: 2018-05-21

## 2018-05-21 PROBLEM — D48.5 NEOPLASM OF UNCERTAIN BEHAVIOR OF SKIN: Status: ACTIVE | Noted: 2018-05-21

## 2018-05-21 PROBLEM — D22.39 MELANOCYTIC NEVI OF OTHER PARTS OF FACE: Status: ACTIVE | Noted: 2018-05-21

## 2018-05-21 PROBLEM — D22.61 MELANOCYTIC NEVI OF RIGHT UPPER LIMB, INCLUDING SHOULDER: Status: ACTIVE | Noted: 2018-05-21

## 2018-05-21 PROBLEM — D22.5 MELANOCYTIC NEVI OF TRUNK: Status: ACTIVE | Noted: 2018-05-21

## 2018-05-21 PROCEDURE — 11100: CPT | Mod: 59

## 2018-05-21 PROCEDURE — ? BIOPSY BY SHAVE METHOD

## 2018-05-21 PROCEDURE — ? LIQUID NITROGEN

## 2018-05-21 PROCEDURE — 99213 OFFICE O/P EST LOW 20 MIN: CPT | Mod: 25

## 2018-05-21 PROCEDURE — ? COUNSELING

## 2018-05-21 PROCEDURE — 17000 DESTRUCT PREMALG LESION: CPT

## 2018-05-21 ASSESSMENT — LOCATION DETAILED DESCRIPTION DERM
LOCATION DETAILED: RIGHT PROXIMAL DORSAL FOREARM
LOCATION DETAILED: LEFT PROXIMAL DORSAL FOREARM
LOCATION DETAILED: LEFT SUPERIOR MEDIAL MALAR CHEEK
LOCATION DETAILED: INFERIOR MID FOREHEAD
LOCATION DETAILED: LEFT MEDIAL SUPERIOR CHEST
LOCATION DETAILED: LEFT INFERIOR ANTERIOR NECK
LOCATION DETAILED: LEFT INFERIOR CENTRAL MALAR CHEEK
LOCATION DETAILED: LEFT INFERIOR LATERAL NECK

## 2018-05-21 ASSESSMENT — LOCATION SIMPLE DESCRIPTION DERM
LOCATION SIMPLE: LEFT ANTERIOR NECK
LOCATION SIMPLE: LEFT FOREARM
LOCATION SIMPLE: LEFT CHEEK
LOCATION SIMPLE: CHEST
LOCATION SIMPLE: INFERIOR FOREHEAD
LOCATION SIMPLE: RIGHT FOREARM

## 2018-05-21 ASSESSMENT — LOCATION ZONE DERM
LOCATION ZONE: TRUNK
LOCATION ZONE: FACE
LOCATION ZONE: ARM
LOCATION ZONE: NECK

## 2018-05-21 NOTE — PROCEDURE: LIQUID NITROGEN
Consent: The patient's consent was obtained including but not limited to risks of crusting, scabbing, blistering, scarring, darker or lighter pigmentary change, recurrence, incomplete removal and infection.
Detail Level: Detailed
Render Post-Care Instructions In Note?: no
Number Of Freeze-Thaw Cycles: 1 freeze-thaw cycle
Duration Of Freeze Thaw-Cycle (Seconds): 15
Post-Care Instructions: I reviewed with the patient in detail post-care instructions. Patient is to wear sunprotection, and avoid picking at any of the treated lesions. Pt may apply Vaseline to crusted or scabbing areas.

## 2018-05-21 NOTE — PROCEDURE: BIOPSY BY SHAVE METHOD
Biopsy Type: H and E
Curettage Text: The wound bed was treated with curettage after the biopsy was performed.
Billing Type: Third-Party Bill
Silver Nitrate Text: The wound bed was treated with silver nitrate after the biopsy was performed.
Anesthesia Volume In Cc: 0
Cryotherapy Text: The wound bed was treated with cryotherapy after the biopsy was performed.
Wound Care: Petrolatum
Notification Instructions: Patient will be notified of biopsy results. However, patient instructed to call the office if not contacted within 2 weeks.
Type Of Destruction Used: Curettage
Hemostasis: Aluminum Chloride and Electrocautery
Lab Facility: 
Post-Care Instructions: I reviewed with the patient in detail post-care instructions. Patient is to keep the biopsy site bandaged overnight, and then wash once daily with soap and water and then apply a thin layer of petrolatum once daily until healed.
Destruction After The Procedure: No
Electrodesiccation And Curettage Text: The wound bed was treated with electrodesiccation and curettage after the biopsy was performed.
Detail Level: Detailed
Consent: Written consent was obtained and risks were reviewed including but not limited to scarring, infection, bleeding, scabbing, incomplete removal, nerve damage and allergy to anesthesia.
Dressing: bandage
Anesthesia Type: 1% lidocaine with epinephrine
Electrodesiccation Text: The wound bed was treated with electrodesiccation after the biopsy was performed.
Lab: [id number]
Was A Bandage Applied: Yes
Size Of Lesion In Cm: 0.6

## 2018-08-06 ENCOUNTER — TELEPHONE (OUTPATIENT)
Dept: CARDIOLOGY | Facility: MEDICAL CENTER | Age: 63
End: 2018-08-06

## 2018-08-06 ENCOUNTER — NON-PROVIDER VISIT (OUTPATIENT)
Dept: CARDIOLOGY | Facility: MEDICAL CENTER | Age: 63
End: 2018-08-06
Attending: INTERNAL MEDICINE
Payer: COMMERCIAL

## 2018-08-06 ENCOUNTER — TELEPHONE (OUTPATIENT)
Dept: MEDICAL GROUP | Facility: MEDICAL CENTER | Age: 63
End: 2018-08-06

## 2018-08-06 DIAGNOSIS — R73.01 IFG (IMPAIRED FASTING GLUCOSE): ICD-10-CM

## 2018-08-06 DIAGNOSIS — I48.0 PAROXYSMAL ATRIAL FIBRILLATION (HCC): ICD-10-CM

## 2018-08-06 DIAGNOSIS — E78.5 HYPERLIPIDEMIA LDL GOAL <100: ICD-10-CM

## 2018-08-06 DIAGNOSIS — I47.19 ATRIAL TACHYCARDIA: ICD-10-CM

## 2018-08-06 RX ORDER — OMEPRAZOLE 20 MG/1
CAPSULE, DELAYED RELEASE ORAL
Qty: 90 CAP | Refills: 3 | Status: SHIPPED | OUTPATIENT
Start: 2018-08-06 | End: 2018-12-14 | Stop reason: SDUPTHER

## 2018-08-06 NOTE — LETTER
August 7, 2018        Sona Thakur  [address] [address]        Dear Sona:    We have received a request from your pharmacy to refill your prescription(s). After careful review of your chart, we have noted you are due for labs and a follow up appointment.  We request you call our office at [phone] at your earliest convenience and make an appointment. I have included a fasting lab order for you.    However, when patients are not followed closely by their physician, potential medication complications may go unadressed. We look forward to scheduling an appointment for you, so that we may provide you with the safest and most complete medical care.        If you have any questions or concerns, please don't hesitate to call.        Sincerely,        DOMINIQUE Fountain.    Electronically Signed

## 2018-08-17 RX ORDER — LOSARTAN POTASSIUM 50 MG/1
TABLET ORAL
Qty: 90 TAB | Refills: 3 | Status: SHIPPED | OUTPATIENT
Start: 2018-08-17 | End: 2019-03-25

## 2018-09-17 ENCOUNTER — TELEPHONE (OUTPATIENT)
Dept: CARDIOLOGY | Facility: MEDICAL CENTER | Age: 63
End: 2018-09-17

## 2018-09-17 NOTE — TELEPHONE ENCOUNTER
----- Message from Norbert Richardson M.D. sent at 9/14/2018  9:30 PM PDT -----  No AF on zio. Follow up in 6-8 mo.    ----- Message -----  From: Jojo Sol L.P.N.  Sent: 8/24/2018   8:40 AM  To: Norbert Richardson M.D.    No FV.

## 2018-09-18 LAB
CHOLEST SERPL-MCNC: 205 MG/DL (ref 100–199)
HBA1C MFR BLD: 5.7 % (ref 4.8–5.6)
HDLC SERPL-MCNC: 56 MG/DL
LABORATORY COMMENT REPORT: ABNORMAL
LDLC SERPL CALC-MCNC: 108 MG/DL (ref 0–99)
TRIGL SERPL-MCNC: 207 MG/DL (ref 0–149)
VLDLC SERPL CALC-MCNC: 41 MG/DL (ref 5–40)

## 2018-09-24 ENCOUNTER — OFFICE VISIT (OUTPATIENT)
Dept: MEDICAL GROUP | Facility: MEDICAL CENTER | Age: 63
End: 2018-09-24
Payer: COMMERCIAL

## 2018-09-24 VITALS
DIASTOLIC BLOOD PRESSURE: 78 MMHG | OXYGEN SATURATION: 98 % | TEMPERATURE: 97.9 F | SYSTOLIC BLOOD PRESSURE: 110 MMHG | WEIGHT: 197 LBS | BODY MASS INDEX: 34.91 KG/M2 | HEART RATE: 83 BPM | HEIGHT: 63 IN

## 2018-09-24 DIAGNOSIS — R31.29 OTHER MICROSCOPIC HEMATURIA: ICD-10-CM

## 2018-09-24 DIAGNOSIS — I83.90 VARICOSE VEIN OF LEG: ICD-10-CM

## 2018-09-24 DIAGNOSIS — Z23 NEED FOR INFLUENZA VACCINATION: ICD-10-CM

## 2018-09-24 DIAGNOSIS — E78.5 HYPERLIPIDEMIA LDL GOAL <100: ICD-10-CM

## 2018-09-24 DIAGNOSIS — N81.10 BLADDER PROLAPSE, FEMALE, ACQUIRED: ICD-10-CM

## 2018-09-24 DIAGNOSIS — G89.29 CHRONIC RIGHT SHOULDER PAIN: ICD-10-CM

## 2018-09-24 DIAGNOSIS — E55.9 VITAMIN D DEFICIENCY: ICD-10-CM

## 2018-09-24 DIAGNOSIS — R73.01 IFG (IMPAIRED FASTING GLUCOSE): ICD-10-CM

## 2018-09-24 DIAGNOSIS — M25.511 CHRONIC RIGHT SHOULDER PAIN: ICD-10-CM

## 2018-09-24 PROCEDURE — 90471 IMMUNIZATION ADMIN: CPT | Performed by: NURSE PRACTITIONER

## 2018-09-24 PROCEDURE — 99214 OFFICE O/P EST MOD 30 MIN: CPT | Mod: 25 | Performed by: NURSE PRACTITIONER

## 2018-09-24 PROCEDURE — 90662 IIV NO PRSV INCREASED AG IM: CPT | Performed by: NURSE PRACTITIONER

## 2018-09-24 ASSESSMENT — PATIENT HEALTH QUESTIONNAIRE - PHQ9: CLINICAL INTERPRETATION OF PHQ2 SCORE: 0

## 2018-09-24 NOTE — PROGRESS NOTES
Subjective:     Sona Thakur is a 62 y.o. female who presents with hematuria.    HPI:   Seen in f/u for hematuria.  [facility] and [facility].  She saw urology and cystoscopy was wnl.  She has a prolapsed bladder.  Would like to get repaired.  She doesn't think thta she would not like surgery at this time.    She had a ct abd for w/u for the hematuria.  Has a liver cyst, small hiatal hernia, calcified lung nodule, irregular bladder floor, diverticula and ddd spine.   [facility] did lab.  Her d is borderline at 30.  She is on otc supplement.   She has a warm spot behind rt knee.  No pain.  Hot with occas burning.  Had this on left thigh in past bue resolved.  Sx started 2 weeks ago.    She is due flu shot  Having rt shoulder pain.  Worse in am.  Limited ROM.  Worse pain is in upper rt arm.  This has been there for several months.    Reviewed lab with pt.  Her a1c is stable at 5.7  LP shows trg are u[p from 178 to 207.  HDL is good at 56.  LDL is improved.  Down from 138 to 108.  She is active.      Patient Active Problem List    Diagnosis Date Noted   • Obesity (BMI 35.0-39.9 without comorbidity) ([facility]) 11/15/2017   • Nasal step visual field defect of both eyes 10/11/2017   • Family history of breast cancer 09/15/2017   • Stress 09/15/2017   • Breast nodule 09/15/2017   • Obesity (BMI 30-39.9) 05/15/2017   • Nuclear cataract 02/14/2017   • Nuclear sclerotic cataract of left eye 01/24/2017   • Cataract    • Glaucoma    • Atrial fibrillation (HCC) 02/15/2013   • GERD (gastroesophageal reflux disease) 05/22/2012   • Allergic rhinitis 05/22/2012   • Hypertension 05/22/2012   • Preventative health care 11/09/2010       Current medicines (including changes today)  Current Outpatient Prescriptions   Medication Sig Dispense Refill   • losartan (COZAAR) 50 MG Tab TAKE ONE TABLET BY MOUTH ONCE DAILY 90 Tab 3   • omeprazole (PRILOSEC) 20 MG delayed-release capsule TAKE 1 CAPSULE BY MOUTH ONCE DAILY  "IN THE MORNING AND 2CAPSULES ONCE DAILY IN THE EVENING 90 Cap 3   • sertraline (ZOLOFT) 100 MG Tab TAKE ONE TABLET BY MOUTH ONCE DAILY 90 Tab 3   • cholestyramine (QUESTRAN) 4 g packet DISSOLVE & MIX POWDER IN LIQUID AND TAKE BY MOUTH TWICE DAILY 180 Each 3   • potassium chloride SA (KLOR-CON M10) 10 MEQ Tab CR Take 1 Tab by mouth every day. 90 Tab 3   • acetaminophen/caffeine/butalbital 325-40-50 mg (FIORICET) -40 MG Tab Take 1 Tab by mouth every four hours as needed for Headache. 30 Tab 1   • aspirin (ASA) 325 MG Tab Take 325 mg by mouth every day.     • ascorbic acid (ASCORBIC ACID) 500 MG Tab Take 500 mg by mouth every day.     • calcium-vitamin D (CALCIUM 500 + D) 500-125 MG-UNIT Tab Take 1 Tab by mouth every day.     • chlorthalidone (HYGROTON) 25 MG Tab TAKE ONE TABLET BY MOUTH ONCE DAILY 90 Tab 3   • Omega-3 Fatty Acids (FISH OIL) 1000 MG Cap capsule Take 1 Cap by mouth 3 times a day, with meals. 90 Cap 0   • Cholecalciferol (VITAMIN D) 2000 UNITS Cap Take 1 Cap by mouth every day. (Patient taking differently: Take 2,000 Units by mouth 2 Times a Day.) 30 Cap 0   • acetaminophen (TYLENOL) 325 MG Tab Take 2 Tabs by mouth every four hours as needed. 30 Tab 0   • timolol (TIMOPTIC) 0.5 % SOLN Place 1 Drop in both eyes 2 times a day.     • travoprost (TRAVOPROST) 0.004 % SOLN Place 1 Drop in left eye every evening.       No current facility-administered medications for this visit.        Allergies   Allergen Reactions   • Allegra      \"Gave me a bad headache   • Lipitor [Atorvastatin Calcium]      \"Hurts my bones\"   • Pravastatin      Back, legs and hip pain       ROS  Constitutional: Negative. Negative for fever, chills, weight loss, malaise/fatigue and diaphoresis.   HENT: Negative. Negative for hearing loss, ear pain, nosebleeds, congestion, sore throat, neck pain, tinnitus and ear discharge.   Respiratory: Negative. Negative for cough, hemoptysis, sputum production, shortness of breath, wheezing and " "stridor.   Cardiovascular: Negative. Negative for chest pain, palpitations, orthopnea, claudication, leg swelling and PND.   Gastrointestinal: Denies nausea, vomiting, diarrhea, constipation, heartburn, melena or hematochezia.  Genitourinary: Denies dysuria, hematuria, urinary incontinence, frequency or urgency.        Objective:     Blood pressure 110/78, pulse 83, temperature 36.6 °C (97.9 °F), height 1.6 m (5' 3\"), weight 89.4 kg (197 lb), SpO2 98 %, not currently breastfeeding. Body mass index is 34.9 kg/m².    Physical Exam:  Vitals reviewed.  Constitutional: Oriented to person, place, and time. appears well-developed and well-nourished. No distress.   Cardiovascular: Normal rate, regular rhythm, normal heart sounds and intact distal pulses. Exam reveals no gallop and no friction rub. No murmur heard. No carotid bruits.   Pulmonary/Chest: Effort normal and breath sounds normal. No stridor. No respiratory distress. no wheezes or rales. exhibits no tenderness.   Musculoskeletal: Normal range of motion. exhibits no edema. taran pedal pulses 2+.  Lymphadenopathy: No cervical or supraclavicular adenopathy.   Neurological: Alert and oriented to person, place, and time. exhibits normal muscle tone.  Skin: Skin is warm and dry. No diaphoresis.  Varicose veins behind rt knee and up into rt thigh.    Psychiatric: Normal mood and affect. Behavior is normal.      Assessment and Plan:     The following treatment plan was discussed:    1. Other microscopic hematuria  REFERRAL TO GYNECOLOGY    stable for now. p kajal r/t bladder prolapse.  refer to gyn for tx of prolapze   2. Bladder prolapse, female, acquired  REFERRAL TO GYNECOLOGY    refer gyn for tx.     3. Vitamin D deficiency     4. Chronic right shoulder pain  REFERRAL TO PHYSICAL THERAPY Reason for Therapy: Eval/Treat/Report   5. Varicose vein of leg  REFERRAL TO VASCULAR MEDICINE Reason for Referral? Established Vascular Disease   6. BMI 34.0-34.9,adult  Patient " identified as having weight management issue.  Appropriate orders and counseling given.   7. Need for influenza vaccination  INFLUENZA VACCINE, HIGH DOSE (65+ ONLY)   8. Hyperlipidemia LDL goal <100      improved on meds   9. IFG (impaired fasting glucose)      stable but needs to dec complex carbs in diet.          Followup: Return in about 6 months (around 3/24/2019).

## 2018-09-25 PROCEDURE — 0296T PR EXT ECG > 48HR TO 21 DAY RCRD W/CONECT INTL RCRD: CPT | Performed by: INTERNAL MEDICINE

## 2018-09-25 PROCEDURE — 0298T PR EXT ECG > 48HR TO 21 DAY REVIEW AND INTERPRETATN: CPT | Performed by: INTERNAL MEDICINE

## 2018-10-29 ENCOUNTER — OFFICE VISIT (OUTPATIENT)
Dept: VASCULAR LAB | Facility: MEDICAL CENTER | Age: 63
End: 2018-10-29
Attending: INTERNAL MEDICINE
Payer: COMMERCIAL

## 2018-10-29 VITALS
SYSTOLIC BLOOD PRESSURE: 126 MMHG | HEIGHT: 63 IN | BODY MASS INDEX: 35.24 KG/M2 | DIASTOLIC BLOOD PRESSURE: 70 MMHG | HEART RATE: 76 BPM | WEIGHT: 198.9 LBS

## 2018-10-29 DIAGNOSIS — E66.9 OBESITY (BMI 35.0-39.9 WITHOUT COMORBIDITY): ICD-10-CM

## 2018-10-29 DIAGNOSIS — I83.92 VARICOSE VEINS OF LEFT LOWER EXTREMITY, UNSPECIFIED WHETHER COMPLICATED: ICD-10-CM

## 2018-10-29 DIAGNOSIS — I10 ESSENTIAL HYPERTENSION, BENIGN: ICD-10-CM

## 2018-10-29 DIAGNOSIS — R73.03 PREDIABETES: ICD-10-CM

## 2018-10-29 DIAGNOSIS — E55.9 VITAMIN D DEFICIENCY: ICD-10-CM

## 2018-10-29 DIAGNOSIS — E78.5 DYSLIPIDEMIA: ICD-10-CM

## 2018-10-29 DIAGNOSIS — E78.1 HYPERTRIGLYCERIDEMIA: ICD-10-CM

## 2018-10-29 DIAGNOSIS — M79.89 SWELLING OF LOWER LEG: ICD-10-CM

## 2018-10-29 PROCEDURE — 99204 OFFICE O/P NEW MOD 45 MIN: CPT | Performed by: FAMILY MEDICINE

## 2018-10-29 PROCEDURE — 99212 OFFICE O/P EST SF 10 MIN: CPT | Performed by: FAMILY MEDICINE

## 2018-10-29 RX ORDER — EZETIMIBE 10 MG/1
10 TABLET ORAL DAILY
Qty: 90 TAB | Refills: 3 | Status: SHIPPED | OUTPATIENT
Start: 2018-10-29 | End: 2019-03-25

## 2018-10-29 ASSESSMENT — ENCOUNTER SYMPTOMS
HEMOPTYSIS: 0
WHEEZING: 0
DIZZINESS: 0
COUGH: 0
ABDOMINAL PAIN: 0
SEIZURES: 0
PALPITATIONS: 0
BLOOD IN STOOL: 0
MYALGIAS: 0
CHILLS: 0
DEPRESSION: 0
BLURRED VISION: 0
NERVOUS/ANXIOUS: 0
SORE THROAT: 0
SHORTNESS OF BREATH: 0
VOMITING: 0
ORTHOPNEA: 0
WEAKNESS: 0
FOCAL WEAKNESS: 0
TREMORS: 0
FEVER: 0
HEADACHES: 0
BRUISES/BLEEDS EASILY: 0
DOUBLE VISION: 0
INSOMNIA: 0
NAUSEA: 0
DIARRHEA: 0

## 2018-10-29 NOTE — PATIENT INSTRUCTIONS
1) zetia 10mg daily to lower cholesterol   2) take vitamin D3 4000 units daily to help your muscles   3) recheck labs in about 6 weeks   4) check leg ultrasounds and lab D-dimer upon your return from travel   5) compression stockings and static exercises

## 2018-11-08 ENCOUNTER — APPOINTMENT (OUTPATIENT)
Dept: PHYSICAL THERAPY | Facility: MEDICAL CENTER | Age: 63
End: 2018-11-08
Attending: NURSE PRACTITIONER
Payer: COMMERCIAL

## 2018-11-08 NOTE — OP THERAPY EVALUATION
Outpatient Physical Therapy  INITIAL EVALUATION    [facility]  [address]  [place] [address]  Phone:  [phone]  Fax:  [phone]    Date of Evaluation: 11/08/2018    Patient: Sona Thakur  YOB: 1955  MRN: [id number]     Referring Provider: JOSS Fountain  [address]  [address]  [place] [address]   Referring Diagnosis Chronic right shoulder pain [M25.511, G89.29]     Time Calculation                 Chief Complaint: No chief complaint on file.    Visit Diagnoses     ICD-10-CM   1. Chronic right shoulder pain M25.511    G89.29         Subjective    Past Medical History:   Diagnosis Date   • Allergy    • Anesthesia     nausea and vomitting   • Arthritis     osteoarthritis, right knee, left hand   • Atrial fibrillation (HCC) 2/15/2013   • GERD (gastroesophageal reflux disease)    • Glaucoma     left eye   • Heart burn    • High cholesterol    • Hypertension    • Unspecified cataract     early stages     Past Surgical History:   Procedure Laterality Date   • BROW LIFT Bilateral 10/11/2017    Procedure: BROW LIFT - FOR DIRECT BROWPLASTY;  Surgeon: Nitish Martínez M.D.;  Location: SURGERY SAME DAY [facility];  Service: Ophthalmology   • BLEPHAROPLASTY Bilateral 10/11/2017    Procedure: BLEPHAROPLASTY - UPPER LID;  Surgeon: Nitish Martínez M.D.;  Location: SURGERY SAME DAY [facility];  Service: Ophthalmology   • CATARACT PHACO WITH IOL Right 2/14/2017    Procedure: CATARACT PHACO WITH IOL;  Surgeon: Yuan Sousa M.D.;  Location: SURGERY SURGICAL [facility];  Service:    • CATARACT PHACO WITH IOL Left 1/24/2017    Procedure: CATARACT PHACO WITH IOL;  Surgeon: Yuan Sousa M.D.;  Location: SURGERY SURGICAL [facility];  Service:    • RECOVERY  5/18/2015    Procedure: A-FIB ABLATION CARTO LRG GRP ICD: 427.31 ZAMARRIPA;  Surgeon: Recoveryon Surgery;  Location: SURGERY PRE-POST PROC UNIT [facility];  Service:    • RECOVERY   7/30/2014    Performed by Cath-Recovery Surgery at SURGERY SAME DAY [facility] ORS   • OTHER CARDIAC SURGERY  2014    ablation   • ABDOMINAL HYSTERECTOMY TOTAL  1994    FAUSTINO/BSO for prolapse   • TONSILLECTOMY AND ADENOIDECTOMY  1979     Social History   Substance Use Topics   • Smoking status: Former Smoker     Packs/day: 0.50     Years: 5.00     Types: Cigars     Quit date: 1/28/2015   • Smokeless tobacco: Never Used   • Alcohol use No     Family and Occupational History     Social History   • Marital status:      Spouse name: N/A   • Number of children: N/A   • Years of education: N/A       Objective    Exercises/Treatment  Time-based treatments/modalities:          Assessment/Response/Plan    Functional Limitation G-Codes and Severity Modifiers      Current:     Goal:       Referring provider co-signature:  I have reviewed this plan of care and my co-signature certifies the need for services.  Certification Dates:   From ***     To ***    Physician Signature: ________________________________ Date: ______________

## 2018-11-09 ENCOUNTER — HOSPITAL ENCOUNTER (OUTPATIENT)
Dept: RADIOLOGY | Facility: MEDICAL CENTER | Age: 63
End: 2018-11-09
Attending: FAMILY MEDICINE
Payer: COMMERCIAL

## 2018-11-09 DIAGNOSIS — M79.89 SWELLING OF LOWER LEG: ICD-10-CM

## 2018-11-09 PROCEDURE — 93970 EXTREMITY STUDY: CPT | Mod: 26 | Performed by: SURGERY

## 2018-11-09 PROCEDURE — 93970 EXTREMITY STUDY: CPT

## 2018-11-12 ENCOUNTER — APPOINTMENT (RX ONLY)
Dept: URBAN - METROPOLITAN AREA CLINIC 31 | Facility: CLINIC | Age: 63
Setting detail: DERMATOLOGY
End: 2018-11-12

## 2018-11-12 DIAGNOSIS — L82.1 OTHER SEBORRHEIC KERATOSIS: ICD-10-CM

## 2018-11-12 DIAGNOSIS — L57.8 OTHER SKIN CHANGES DUE TO CHRONIC EXPOSURE TO NONIONIZING RADIATION: ICD-10-CM

## 2018-11-12 DIAGNOSIS — D18.0 HEMANGIOMA: ICD-10-CM

## 2018-11-12 DIAGNOSIS — L57.0 ACTINIC KERATOSIS: ICD-10-CM

## 2018-11-12 DIAGNOSIS — D22 MELANOCYTIC NEVI: ICD-10-CM

## 2018-11-12 PROBLEM — D22.62 MELANOCYTIC NEVI OF LEFT UPPER LIMB, INCLUDING SHOULDER: Status: ACTIVE | Noted: 2018-11-12

## 2018-11-12 PROBLEM — D22.5 MELANOCYTIC NEVI OF TRUNK: Status: ACTIVE | Noted: 2018-11-12

## 2018-11-12 PROBLEM — D18.01 HEMANGIOMA OF SKIN AND SUBCUTANEOUS TISSUE: Status: ACTIVE | Noted: 2018-11-12

## 2018-11-12 PROBLEM — D48.5 NEOPLASM OF UNCERTAIN BEHAVIOR OF SKIN: Status: ACTIVE | Noted: 2018-11-12

## 2018-11-12 PROBLEM — D22.61 MELANOCYTIC NEVI OF RIGHT UPPER LIMB, INCLUDING SHOULDER: Status: ACTIVE | Noted: 2018-11-12

## 2018-11-12 PROCEDURE — 11100: CPT | Mod: 59

## 2018-11-12 PROCEDURE — 17003 DESTRUCT PREMALG LES 2-14: CPT

## 2018-11-12 PROCEDURE — 17000 DESTRUCT PREMALG LESION: CPT

## 2018-11-12 PROCEDURE — ? LIQUID NITROGEN

## 2018-11-12 PROCEDURE — 99396 PREV VISIT EST AGE 40-64: CPT | Mod: 25

## 2018-11-12 PROCEDURE — ? BIOPSY BY SHAVE METHOD

## 2018-11-12 PROCEDURE — ? COUNSELING

## 2018-11-12 ASSESSMENT — LOCATION DETAILED DESCRIPTION DERM
LOCATION DETAILED: LEFT ANTERIOR DISTAL UPPER ARM
LOCATION DETAILED: RIGHT LATERAL SUPERIOR CHEST
LOCATION DETAILED: RIGHT INFERIOR MEDIAL UPPER BACK
LOCATION DETAILED: RIGHT INFERIOR POSTERIOR NECK
LOCATION DETAILED: RIGHT SUPERIOR MEDIAL UPPER BACK
LOCATION DETAILED: LEFT PROXIMAL POSTERIOR UPPER ARM
LOCATION DETAILED: LEFT SUPERIOR MEDIAL MIDBACK
LOCATION DETAILED: LEFT NASAL SIDEWALL
LOCATION DETAILED: LEFT SUPERIOR ANTERIOR NECK
LOCATION DETAILED: RIGHT SUPERIOR MEDIAL MIDBACK
LOCATION DETAILED: LEFT CENTRAL MALAR CHEEK
LOCATION DETAILED: LEFT VENTRAL PROXIMAL FOREARM
LOCATION DETAILED: RIGHT ANTERIOR DISTAL UPPER ARM
LOCATION DETAILED: RIGHT DORSAL MIDDLE METACARPOPHALANGEAL JOINT
LOCATION DETAILED: LEFT INFERIOR MEDIAL UPPER BACK
LOCATION DETAILED: LEFT SUPERIOR FOREHEAD
LOCATION DETAILED: LEFT POSTERIOR SHOULDER
LOCATION DETAILED: LEFT ULNAR DORSAL HAND
LOCATION DETAILED: RIGHT POSTERIOR SHOULDER
LOCATION DETAILED: RIGHT DISTAL LATERAL POSTERIOR UPPER ARM
LOCATION DETAILED: RIGHT MEDIAL INFERIOR CHEST
LOCATION DETAILED: LEFT SUPERIOR UPPER BACK

## 2018-11-12 ASSESSMENT — LOCATION SIMPLE DESCRIPTION DERM
LOCATION SIMPLE: LEFT ANTERIOR NECK
LOCATION SIMPLE: LEFT CHEEK
LOCATION SIMPLE: LEFT FOREHEAD
LOCATION SIMPLE: LEFT NOSE
LOCATION SIMPLE: RIGHT UPPER BACK
LOCATION SIMPLE: POSTERIOR NECK
LOCATION SIMPLE: LEFT POSTERIOR UPPER ARM
LOCATION SIMPLE: LEFT HAND
LOCATION SIMPLE: RIGHT POSTERIOR UPPER ARM
LOCATION SIMPLE: RIGHT LOWER BACK
LOCATION SIMPLE: LEFT FOREARM
LOCATION SIMPLE: LEFT LOWER BACK
LOCATION SIMPLE: RIGHT HAND
LOCATION SIMPLE: LEFT SHOULDER
LOCATION SIMPLE: CHEST
LOCATION SIMPLE: RIGHT UPPER ARM
LOCATION SIMPLE: RIGHT SHOULDER
LOCATION SIMPLE: LEFT UPPER ARM
LOCATION SIMPLE: LEFT UPPER BACK

## 2018-11-12 ASSESSMENT — LOCATION ZONE DERM
LOCATION ZONE: NECK
LOCATION ZONE: TRUNK
LOCATION ZONE: NOSE
LOCATION ZONE: ARM
LOCATION ZONE: HAND
LOCATION ZONE: FACE

## 2018-11-12 NOTE — PROCEDURE: LIQUID NITROGEN
Render Post-Care Instructions In Note?: no
Post-Care Instructions: I reviewed with the patient in detail post-care instructions. Patient is to wear sunprotection, and avoid picking at any of the treated lesions. Pt may apply Vaseline to crusted or scabbing areas.
Number Of Freeze-Thaw Cycles: 1 freeze-thaw cycle
Consent: The patient's consent was obtained including but not limited to risks of crusting, scabbing, blistering, scarring, darker or lighter pigmentary change, recurrence, incomplete removal and infection.
Duration Of Freeze Thaw-Cycle (Seconds): 15
Detail Level: Detailed

## 2018-11-12 NOTE — PROCEDURE: BIOPSY BY SHAVE METHOD
Electrodesiccation Text: The wound bed was treated with electrodesiccation after the biopsy was performed.
Post-Care Instructions: I reviewed with the patient in detail post-care instructions. Patient is to keep the biopsy site bandaged overnight, and then wash once daily with soap and water and then apply a thin layer of petrolatum once daily until healed.
Render Post-Care Instructions In Note?: no
Detail Level: Detailed
Depth Of Biopsy: dermis
Biopsy Type: H and E
Anesthesia Volume In Cc: 0
Consent: Written consent was obtained and risks were reviewed including but not limited to scarring, infection, bleeding, scabbing, incomplete removal, nerve damage and allergy to anesthesia.
Was A Bandage Applied: Yes
Silver Nitrate Text: The wound bed was treated with silver nitrate after the biopsy was performed.
Lab: [id number]
Dressing: bandage
Curettage Text: The wound bed was treated with curettage after the biopsy was performed.
Wound Care: Petrolatum
Lab Facility: 
Hemostasis: Aluminum Chloride and Electrocautery
Notification Instructions: Patient will be notified of biopsy results. However, patient instructed to call the office if not contacted within 2 weeks.
Billing Type: Third-Party Bill
Electrodesiccation And Curettage Text: The wound bed was treated with electrodesiccation and curettage after the biopsy was performed.
Size Of Lesion In Cm: 0.6
Cryotherapy Text: The wound bed was treated with cryotherapy after the biopsy was performed.
Type Of Destruction Used: Curettage
Anesthesia Type: 1% lidocaine with epinephrine

## 2018-11-13 ENCOUNTER — APPOINTMENT (OUTPATIENT)
Dept: PHYSICAL THERAPY | Facility: MEDICAL CENTER | Age: 63
End: 2018-11-13
Payer: COMMERCIAL

## 2018-11-14 ENCOUNTER — TELEPHONE (OUTPATIENT)
Dept: VASCULAR LAB | Facility: MEDICAL CENTER | Age: 63
End: 2018-11-14

## 2018-11-14 ENCOUNTER — PHYSICAL THERAPY (OUTPATIENT)
Dept: PHYSICAL THERAPY | Facility: MEDICAL CENTER | Age: 63
End: 2018-11-14
Attending: NURSE PRACTITIONER
Payer: COMMERCIAL

## 2018-11-14 DIAGNOSIS — M25.511 CHRONIC RIGHT SHOULDER PAIN: ICD-10-CM

## 2018-11-14 DIAGNOSIS — G89.29 CHRONIC RIGHT SHOULDER PAIN: ICD-10-CM

## 2018-11-14 LAB — D DIMER PPP FEU-MCNC: 0.22 MG/L FEU (ref 0–0.49)

## 2018-11-14 PROCEDURE — 97161 PT EVAL LOW COMPLEX 20 MIN: CPT

## 2018-11-14 PROCEDURE — 97110 THERAPEUTIC EXERCISES: CPT

## 2018-11-14 ASSESSMENT — ENCOUNTER SYMPTOMS
PAIN TIMING: INTERMITTENT
ALLEVIATING FACTORS: REST
AWAKENINGS PER NIGHT: 2
ALLEVIATING FACTORS: HEATING PAD
EXACERBATED BY: MOVEMENT
QUALITY: SHARP
EXACERBATED BY: CARRYING
QUALITY: SHOOTING
QUALITY: ACHING
ALLEVIATING FACTORS: CHANGE IN POSITION
PAIN TIMING: IN THE MORNING
ALLEVIATING FACTORS: PAIN MEDICATION
EXACERBATED BY: DRESSING

## 2018-11-14 NOTE — OP THERAPY EVALUATION
Outpatient Physical Therapy  INITIAL EVALUATION    [facility]  [address]  [place] [address]  Phone:  [phone]  Fax:  [phone]    Date of Evaluation: 2018    Patient: Sona Thakur  YOB: 1955  MRN: [id number]     Referring Provider: JOSS Fountain  [address]  [address]  [place] [address]   Referring Diagnosis Chronic right shoulder pain [M25.511, G89.29]     Time Calculation  Start time: 1530  Stop time: 1630 Time Calculation (min): 60 minutes     Physical Therapy Occurrence Codes    Date of onset of impairment:  18   Date physical therapy care plan established or reviewed:  18   Date physical therapy treatment started:  18          Chief Complaint: Shoulder Problem    Visit Diagnoses     ICD-10-CM   1. Chronic right shoulder pain M25.511    G89.29         Subjective:   History of Present Illness:     Mechanism of injury:  The patient reports that last Spring her R arm began hurting. No specific precipitating event, but she was moving furniture around in her home at the time. Also particularly hurt recently when she was pulling on the starter cord of her .     The pain is primarily in upper lateral R arm, between shoulder and elbow. At times more posteriorly. Occasionally radiates down top of R forearm to wrist. Denies numbness/tingling. Denies dizziness, diploplia, drop attack, dysphagia, dysarthria.   Prior level of function:  Retired. 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 Does yard work, housework, helps with dressing and bathing.   Headaches:  no headaches  Sleep disturbance:  Interrupted sleep (if she rolls to her R side)  # Times/Night awakened:  2  Pain:     Current pain ratin    At best pain ratin    At worst pain ratin    Location:  R arm between shoulder and elbow    Quality:  Sharp, shooting and aching    Pain timing:  Intermittent and  in the morning (worse upon waking, shower helps)    Relieving factors:  Rest, change in position, heating pad and pain medication    Aggravating factors:  Dressing, carrying and movement (lifting arms out to the side, putting on t-shirts)    Progression:  Unchanged  Social Support:     Lives in:  One-story house  Hand dominance:  Right  Diagnostic Tests:     None        Past Medical History:   Diagnosis Date   • Allergy    • Anesthesia     nausea and vomitting   • Arthritis     osteoarthritis, right knee, left hand   • Atrial fibrillation (HCC) 2/15/2013   • GERD (gastroesophageal reflux disease)    • Glaucoma     left eye   • Heart burn    • High cholesterol    • Hypertension    • Unspecified cataract     early stages     Past Surgical History:   Procedure Laterality Date   • BROW LIFT Bilateral 10/11/2017    Procedure: BROW LIFT - FOR DIRECT BROWPLASTY;  Surgeon: Nitish Martínez M.D.;  Location: SURGERY SAME DAY [facility];  Service: Ophthalmology   • BLEPHAROPLASTY Bilateral 10/11/2017    Procedure: BLEPHAROPLASTY - UPPER LID;  Surgeon: Nitish Martínez M.D.;  Location: SURGERY SAME DAY [facility];  Service: Ophthalmology   • CATARACT PHACO WITH IOL Right 2/14/2017    Procedure: CATARACT PHACO WITH IOL;  Surgeon: Yuan Sousa M.D.;  Location: SURGERY SURGICAL [facility];  Service:    • CATARACT PHACO WITH IOL Left 1/24/2017    Procedure: CATARACT PHACO WITH IOL;  Surgeon: Yuan Sousa M.D.;  Location: SURGERY SURGICAL [facility];  Service:    • RECOVERY  5/18/2015    Procedure: A-FIB ABLATION CARTO LRG GRP ICD: 427.31 Abie;  Surgeon: Recoveryonly Surgery;  Location: SURGERY PRE-POST PROC UNIT [facility];  Service:    • RECOVERY  7/30/2014    Performed by Cath-Recovery Surgery at SURGERY SAME DAY [facility]   • OTHER CARDIAC SURGERY  2014    ablation   • ABDOMINAL HYSTERECTOMY TOTAL  1994    FAUSTINO/BSO for prolapse   • TONSILLECTOMY AND ADENOIDECTOMY  1979     Social History   Substance Use Topics   • Smoking  status: Former Smoker     Packs/day: 0.50     Years: 5.00     Types: Cigars     Quit date: 1/28/2015   • Smokeless tobacco: Never Used   • Alcohol use No     Family and Occupational History     Social History   • Marital status:      Spouse name: N/A   • Number of children: N/A   • Years of education: N/A       Objective     Observations   Central spine     Positive for forward head/neck, rounded shoulders and thoracic kyphosis.    Additional Observation Details  Mild thoracic kyphois    Neurological Testing     Reflexes   Left   Biceps (C5/C6): normal (2+)  Brachioradialis (C6): normal (2+)  Triceps (C7): normal (2+)    Right   Biceps (C5/C6): normal (2+)  Brachioradialis (C6): normal (2+)  Triceps (C7): normal (2+)    Myotome testing   Cervical (left)   All left cervical myotomes within normal limits    Cervical (right)   All right cervical myotomes within normal limits    Dermatome testing   Cervical (left)   All left cervical dermatomes intact    Cervical (right)   All right cervical dermatomes intact    Palpation   Left   No palpable tenderness to the biceps, cervical paraspinals, infraspinatus, middle deltoid, pectoralis major and supraspinatus.     Right   No palpable tenderness to the biceps, cervical paraspinals, infraspinatus, middle deltoid and supraspinatus.   Tenderness of the pectoralis major.     Right Shoulder Comments  Right pectoralis major: at insertion.     Tenderness     Left Shoulder   Tenderness in the bicipital groove. No tenderness in the AC joint, acromion, biceps tendon (proximal), clavicle, coracoid process, infraspinatus tendon and subscapularis tendon.     Right Shoulder  Tenderness in the bicipital groove. No tenderness in the AC joint, acromion, biceps tendon (proximal), clavicle, coracoid process, infraspinatus tendon and subscapularis tendon.     Active Range of Motion     Cervical Spine   Flexion: within functional limits  Extension: within functional limits  Retraction:  decreased  Left lateral flexion: decreased  Right lateral flexion: decreased  Left rotation: within functional limits  Right rotation: within functional limits  Left Shoulder   Normal active range of motion  Flexion: WFL  Extension: WFL  Abduction: WFL  Adduction: WFL  External rotation 0°:  WFL  External rotation 45°:  WFL  External rotation 90°:  110 degrees WFL  External rotation BTH: WFL  Internal rotation 0°:  WFL  Internal rotation 45°:  WFL  Internal rotation 90°:  90 degrees WFL  Internal rotation BTB: WFL  Horizontal abduction: WFL  Horizontal adduction: WFL    Right Shoulder   Flexion: 160 (pain begins at 90 deg. ) degrees with pain  Extension: WFL  Abduction: 80 (most painful) degrees with pain  External rotation 0°:  WFL  External rotation 45°:  WFL  External rotation 90°:  110 (pain from neutral to 20 deg. only) degreeswith pain  Internal rotation 90°:  80 (pain at end range) degrees with pain    Passive Range of Motion   Left Shoulder   Normal passive range of motion    Right Shoulder   Normal passive range of motion    Joint Play   Left Shoulder     Anterior capsule: within functional limits    Posterior capsule: within functional limits    Inferior capsule: within functional limits    AC joint: within functional limits    SC joint: within funcational limits    Cervical spine: hypomobile    Thoracic spine: hypomobile    Right Shoulder     Anterior capsule: within functional limits    Posterior capsule: within functional limits    Inferior capsule: within functional limits    AC joint: within functional limits    SC joint: within normal limits    Cervical spine: hypomobile    Thoracic spine: hypomobile    Tests     Left Shoulder   Negative apprehension, empty can, Neer's, painful arc and Speed's.     Right Shoulder   Positive empty can, Neer's and painful arc.   Negative apprehension, Hawkin's, Speed's and Yergason's.     Additional Tests Details  RC cuff strong and pain free  bilaterally        Therapeutic Exercises (CPT 12457):       Therapeutic Exercise Summary: Access Code: [email or web address]   URL: [email or web address]   Date: 11/14/2018   Prepared by: Jeanine Cowart      Exercises  · Seated Scapular Retraction - 10 reps - 2 sets - 1x daily - 7x weekly  · Shoulder External Rotation with Resistance - 15 reps - 2 sets - 1x daily - 7x weekly          Time-based treatments/modalities:  Therapeutic exercise minutes (CPT 99261): 8 minutes       Assessment, Response and Plan:   Impairments: abnormal or restricted ROM, impaired physical strength, lacks appropriate home exercise program, limited ADL's and pain with function    Other Impairments:  Posture  Assessment details:  62 year old female with R shoulder pain with insidious onset during a period 6 months ago when she was moving furniture in her home frequently. Exam findings suggest impingement syndrome with postural, muscle length and muscle performance impairments contributing to symptom provocation with daily tasks. Of note, the patient is very physical in her daily tasks, assisting her  with ADLs/IADLs and taking care of her home and lawn.   Barriers to therapy:  None  Prognosis: good    Goals:   Short Term Goals:   R shoulder AROM without provocation of symptoms   Patient is able to don/doff t-shirt without provocation of symptoms        Long Term Goals:    QuickDASH score improved >/= MCID  Patient is able to perform household chores, with improved body mechanics, as evidenced by patient simulation of same without cueing  Pain</= 1/10 with activity    Long term goal time span:  6-8 weeks    Plan:   Therapy options:  Physical therapy treatment to continue  Planned therapy interventions:  E Stim Unattended (CPT 83763), Manual Therapy (CPT 68762), Neuromuscular Re-education (CPT 89609) and Therapeutic Exercise (CPT 54317)  Frequency:  2x week  Duration in weeks:  8  Discussed with:  Patient      Functional Limitation G-Codes and  Severity Modifiers  Quickdash General Total Score: 31.82     Referring provider co-signature:  I have reviewed this plan of care and my co-signature certifies the need for services.  Certification Dates:   From 11/14/2018  To 1/11/2019    Physician Signature: ________________________________ Date: ______________

## 2018-11-15 ENCOUNTER — APPOINTMENT (OUTPATIENT)
Dept: PHYSICAL THERAPY | Facility: MEDICAL CENTER | Age: 63
End: 2018-11-15
Payer: COMMERCIAL

## 2018-11-15 NOTE — TELEPHONE ENCOUNTER
Venous duplex reviewed  Mild bilateral superficial reflux without deep reflux or DVT  May be a good candidate for potential intervention if still symptomatic despite conservative management including stockings    Will discuss with patient at follow-up visit    Michael Bloch, MD  Vascular Care

## 2018-11-16 ENCOUNTER — PHYSICAL THERAPY (OUTPATIENT)
Dept: PHYSICAL THERAPY | Facility: MEDICAL CENTER | Age: 63
End: 2018-11-16
Attending: NURSE PRACTITIONER
Payer: COMMERCIAL

## 2018-11-16 DIAGNOSIS — M25.511 CHRONIC RIGHT SHOULDER PAIN: ICD-10-CM

## 2018-11-16 DIAGNOSIS — G89.29 CHRONIC RIGHT SHOULDER PAIN: ICD-10-CM

## 2018-11-16 PROCEDURE — 97140 MANUAL THERAPY 1/> REGIONS: CPT

## 2018-11-16 PROCEDURE — 97014 ELECTRIC STIMULATION THERAPY: CPT

## 2018-11-16 PROCEDURE — 97110 THERAPEUTIC EXERCISES: CPT

## 2018-11-16 ASSESSMENT — ENCOUNTER SYMPTOMS
PAIN SCALE AT HIGHEST: 6
PAIN SCALE AT LOWEST: 1
PAIN SCALE: 1

## 2018-11-16 NOTE — OP THERAPY DAILY TREATMENT
Outpatient Physical Therapy  DAILY TREATMENT     [facility] Outpatient Physical Therapy  [address]  [place] [address]  Phone:  [phone]  Fax:  [phone]    Date: 11/16/2018    Patient: Sona Thakur  YOB: 1955  MRN: [id number]     Time Calculation    Start: 1330 Stop: 1415 Total: 45 minutes     Chief Complaint: Shoulder Pain    Visit #: 2    SUBJECTIVE:  Pt reports that she is getting upper trap soreness from her initially prescribed exercises; concerned she is not performing them properly.    OBJECTIVE:  Current objective measures: Hypermobile GHJ, spontaneous subluxation with ER bilaterally; painful arc on the right; hypomobile posterior GHJ capsule          Therapeutic Exercises (CPT 15710):     1. Pulleys, x5 min, flexion only    2. Shrugs, rolls, retraction, bilateral ER, against wall x20 each    Therapeutic Treatments and Modalities:     1. Manual Therapy (CPT 78545), IASTM right shoulder girdle, gentle mobs-GRII-III AP mobs with sustained pressure into GRIII-IV; PROM right shoulder across all planes, x10 minutes    2. E Stim Unattended (CPT 97383), IFC+MHP to right shoulder x15 minutes    Time-based treatments/modalities:   Manual: 15 minutes  Therex: 15 minutes       Pain rating before treatment: 3  Pain rating after treatment: 1    ASSESSMENT:   Response to treatment: Pt demos symptoms consistent with right shoulder impingement related to poor posture, anterior capsular shift, and GHJ instability. These limit her ability to perform all typical ADL/IADL such as donning/doffing shirts and reaching up.     PLAN/RECOMMENDATIONS:   Plan for treatment: therapy treatment to continue next visit.  Planned interventions for next visit: continue with current treatment.

## 2018-11-20 ENCOUNTER — PHYSICAL THERAPY (OUTPATIENT)
Dept: PHYSICAL THERAPY | Facility: MEDICAL CENTER | Age: 63
End: 2018-11-20
Attending: NURSE PRACTITIONER
Payer: COMMERCIAL

## 2018-11-20 DIAGNOSIS — M25.511 CHRONIC RIGHT SHOULDER PAIN: ICD-10-CM

## 2018-11-20 DIAGNOSIS — G89.29 CHRONIC RIGHT SHOULDER PAIN: ICD-10-CM

## 2018-11-20 PROCEDURE — 97110 THERAPEUTIC EXERCISES: CPT

## 2018-11-20 PROCEDURE — 97140 MANUAL THERAPY 1/> REGIONS: CPT

## 2018-11-20 PROCEDURE — 97014 ELECTRIC STIMULATION THERAPY: CPT

## 2018-11-20 NOTE — OP THERAPY DAILY TREATMENT
Outpatient Physical Therapy  DAILY TREATMENT     [facility] Outpatient Physical Therapy  [address]  [place] [address]  Phone:  [phone]  Fax:  [phone]    Date: 11/20/2018    Patient: Sona Thakur  YOB: 1955  MRN: [id number]     Time Calculation  Start time: 1000 Start: 1330 Stop: 1415 Total: 45 minutes     Chief Complaint: Shoulder Pain    Visit #: 3    SUBJECTIVE:  Pt reports that she has noticed slight improvements in her right shoulder ROM.    OBJECTIVE:  Current objective measures: Hypermobile GHJ, spontaneous subluxation with ER bilaterally; painful arc on the right; hypomobile posterior GHJ capsule          Therapeutic Exercises (CPT 61689):     1. Pulleys, x5 min, flexion only    2. Shrugs, rolls, retraction, bilateral ER, against wall x20 each      Therapeutic Exercise Summary: Access Code: [email or web address]   URL: [email or web address]   Date: 11/20/2018   Prepared by: Liana Abdoul      Exercises  · Standing Shoulder Shrugs - 10 reps - 3 sets - 1x daily - 7x weekly  · Standing Backward Shoulder Shrug with Dumbbells - 10 reps - 3 sets - 1x daily - 7x weekly  · Standing Scapular Retraction - 10 reps - 3 sets - 1x daily - 7x weekly  · Standing Shoulder External Rotation with Resistance - 10 reps - 3 sets - 1x daily - 7x weekly    Access Code: [email or web address]   URL: [email or web address]   Date: 11/20/2018   Prepared by: Liana Schreiber      Exercises  · Standing Shoulder Row with Anchored Resistance - 10-15 reps - 3 sets - 1x daily - 7x weekly  · Shoulder Extension with Resistance - 10-15 reps - 3 sets - 1x daily - 7x weekly  · Sidelying Shoulder External Rotation Dumbbell - 5 reps - 3 sets - 1x daily - 7x weekly            Therapeutic Treatments and Modalities:     1. Manual Therapy (CPT 50158), IASTM right shoulder girdle, gentle mobs-GRII-III AP mobs with sustained pressure into GRIII-IV; PROM right shoulder across all planes; KT for  generalized shoulder pain, x15 minutes    2. E Stim Unattended (CPT 11280), IFC+MHP to right shoulder x15 minutes    Time-based treatments/modalities:  Manual therapy minutes (CPT 38520): 15 minutes  Therapeutic exercise minutes (CPT 00791): 15 minutes     Pain rating before treatment: 3  Pain rating after treatment: 1    ASSESSMENT:   Response to treatment: Pt continues to have GHJ instability leading to anterior shoulder pain/inflammation. Pt had mild improvement in symptoms with AROM following addition of KT. Pt had significantly reduced pain/symptoms with PROM while therapist applied AP stabilization/block to right GHJ. Pt HEP was updated and will reassess symptoms and compliance next session.     PLAN/RECOMMENDATIONS:   Plan for treatment: therapy treatment to continue next visit.  Planned interventions for next visit: continue with current treatment.

## 2018-11-26 ENCOUNTER — APPOINTMENT (OUTPATIENT)
Dept: PHYSICAL THERAPY | Facility: MEDICAL CENTER | Age: 63
End: 2018-11-26
Attending: NURSE PRACTITIONER
Payer: COMMERCIAL

## 2018-11-28 ENCOUNTER — PHYSICAL THERAPY (OUTPATIENT)
Dept: PHYSICAL THERAPY | Facility: MEDICAL CENTER | Age: 63
End: 2018-11-28
Attending: NURSE PRACTITIONER
Payer: COMMERCIAL

## 2018-11-28 DIAGNOSIS — M25.511 CHRONIC RIGHT SHOULDER PAIN: ICD-10-CM

## 2018-11-28 DIAGNOSIS — G89.29 CHRONIC RIGHT SHOULDER PAIN: ICD-10-CM

## 2018-11-28 PROCEDURE — 97140 MANUAL THERAPY 1/> REGIONS: CPT

## 2018-11-28 PROCEDURE — 97110 THERAPEUTIC EXERCISES: CPT

## 2018-11-28 NOTE — OP THERAPY DAILY TREATMENT
Outpatient Physical Therapy  DAILY TREATMENT     [facility] Outpatient Physical Therapy  [address]  [place] [address]  Phone:  [phone]  Fax:  [phone]    Date: 11/28/2018    Patient: Sona Thakur  YOB: 1955  MRN: [id number]     Time Calculation  Start time: 1505  Stop time: 1535 Time Calculation (min): 30 minutes     Chief Complaint: No chief complaint on file.    Visit #: 4    SUBJECTIVE:  Feeling better, still have slight twinges of pain when raising arm out to the side but reaching behind back is better.     OBJECTIVE:          Therapeutic Exercises (CPT 71329):     1. Pulleys, x5 min, flexion only    2. Shrugs, rolls, retraction, against wall x10 each    3. Shoulder ext, pink 10x2    4. Row , pink  10x 2    5. ER walkout , pink x5, slight aggravation at R shoulder    6. Sidelying ER, x15 0#      Therapeutic Exercise Summary: HEP (pt to try sidelying ER at home)  Access Code: [email or web address]   URL: [email or web address]   Date: 11/20/2018   Prepared by: Liana Abdoul      Exercises  · Standing Shoulder Shrugs - 10 reps - 3 sets - 1x daily - 7x weekly  · Standing Backward Shoulder Shrug with Dumbbells - 10 reps - 3 sets - 1x daily - 7x weekly  · Standing Scapular Retraction - 10 reps - 3 sets - 1x daily - 7x weekly  · Standing Shoulder External Rotation with Resistance - 10 reps - 3 sets - 1x daily - 7x weekly    Access Code: [email or web address]   URL: [email or web address]   Date: 11/20/2018   Prepared by: Liana Abdoul      Exercises  · Standing Shoulder Row with Anchored Resistance - 10-15 reps - 3 sets - 1x daily - 7x weekly  · Shoulder Extension with Resistance - 10-15 reps - 3 sets - 1x daily - 7x weekly  · Sidelying Shoulder External Rotation Dumbbell - 5 reps - 3 sets - 1x daily - 7x weekly            Therapeutic Treatments and Modalities:     1. Manual Therapy (CPT 34055), Gentle mobs-GRII-III AP mobs with sustained pressure into GRIII-IV;  PROM right shoulder across all planes, x15 minutes, IFC unable at end of today's session    Time-based treatments/modalities:  Manual therapy minutes (CPT 61961): 15 minutes  Therapeutic exercise minutes (CPT 89094): 15 minutes       Pain rating before treatment: 1  Pain rating after treatment: 0    ASSESSMENT:   Response to treatment: Pt showing good progress with ROM. She demo'd proper scapular setting with row and shoulder ext exercises. Difficulty with standing ER resistance exercise, pt pt had good tolerance to sidelying ER so added it to HEP.    PLAN/RECOMMENDATIONS:   Plan for treatment: therapy treatment to continue next visit.  Planned interventions for next visit: continue with current treatment. Continue with shoulder and scapular strengthening

## 2018-11-30 ENCOUNTER — TELEPHONE (OUTPATIENT)
Dept: CARDIOLOGY | Facility: MEDICAL CENTER | Age: 63
End: 2018-11-30

## 2018-12-01 NOTE — TELEPHONE ENCOUNTER
Clearance request received from [facility] for Bilateral Lower Extremity Vein Ablations of the Great and Small Saphenous Veins to be scheduled. Pt is on full strength ASA.    To Dr. Richardson--please advise.

## 2018-12-03 ENCOUNTER — PHYSICAL THERAPY (OUTPATIENT)
Dept: PHYSICAL THERAPY | Facility: MEDICAL CENTER | Age: 63
End: 2018-12-03
Attending: NURSE PRACTITIONER
Payer: COMMERCIAL

## 2018-12-03 DIAGNOSIS — M25.511 CHRONIC RIGHT SHOULDER PAIN: ICD-10-CM

## 2018-12-03 DIAGNOSIS — G89.29 CHRONIC RIGHT SHOULDER PAIN: ICD-10-CM

## 2018-12-03 PROCEDURE — 97140 MANUAL THERAPY 1/> REGIONS: CPT

## 2018-12-03 PROCEDURE — 97110 THERAPEUTIC EXERCISES: CPT

## 2018-12-03 PROCEDURE — 97014 ELECTRIC STIMULATION THERAPY: CPT

## 2018-12-03 NOTE — OP THERAPY DAILY TREATMENT
Outpatient Physical Therapy  DAILY TREATMENT     [facility] Outpatient Physical Therapy  [address]  [place] [address]  Phone:  [phone]  Fax:  [phone]    Date: 12/03/2018    Patient: Sona Thakur  YOB: 1955  MRN: [id number]     Time Calculation  Start time: 1530  Stop time: 1615 Time Calculation (min): 45 minutes     Chief Complaint: No chief complaint on file.    Visit #: 5    SUBJECTIVE:  Has been feeling much better. Does not have much pain with moving the arm through its range.     OBJECTIVE:  Current objective measures:           Therapeutic Exercises (CPT 80439):     1. Pulleys, x5 min, flexion only    2. , Rolls, retraction and wall angels to 90 deg. elevation, against wall x10 each    3. Shoulder ext, pink 10x2    4. Row , pink  10x 2    5. On half foam, scapular adduction/depression 10 x 2, anterior chest/pec stretch 1 min, R shoulder flexion AROM 20 x 1      Therapeutic Exercise Summary: HEP (pt to try sidelying ER at home)  Access Code: [email or web address]   URL: [email or web address]   Date: 11/20/2018   Prepared by: Liana Abdoul      Exercises  · Standing Shoulder Shrugs - 10 reps - 3 sets - 1x daily - 7x weekly  · Standing Backward Shoulder Shrug with Dumbbells - 10 reps - 3 sets - 1x daily - 7x weekly  · Standing Scapular Retraction - 10 reps - 3 sets - 1x daily - 7x weekly  · Standing Shoulder External Rotation with Resistance - 10 reps - 3 sets - 1x daily - 7x weekly    Access Code: [email or web address]   URL: [email or web address]   Date: 11/20/2018   Prepared by: Liana Abdoul      Exercises  · Standing Shoulder Row with Anchored Resistance - 10-15 reps - 3 sets - 1x daily - 7x weekly  · Shoulder Extension with Resistance - 10-15 reps - 3 sets - 1x daily - 7x weekly  · Sidelying Shoulder External Rotation Dumbbell - 5 reps - 3 sets - 1x daily - 7x weekly            Therapeutic Treatments and Modalities:     1. Manual Therapy (CPT  94707), Gentle mobs-GRII-III AP mobs with sustained pressure into GRIII-IV; PROM right shoulder across all planes, 8    2. E Stim Unattended (CPT 60032), IFC R shoulder W/MHP 15 '    Time-based treatments/modalities:  Manual therapy minutes (CPT 43611): 10 minutes  Therapeutic exercise minutes (CPT 90801): 20 minutes       Pain rating before treatment: 1  Pain rating after treatment: 0    ASSESSMENT:   Response to treatment: Improving ability to engage periscapular musculature appropriately with R shoulder AROM in all planes. Symptom provocation at end range flexion in supine on half foam relieved with cueing to set scapula.     PLAN/RECOMMENDATIONS:   Plan for treatment: therapy treatment to continue next visit.  Planned interventions for next visit: continue with current treatment.

## 2018-12-04 NOTE — TELEPHONE ENCOUNTER
Norbert Richardson M.D.   You 18 minutes ago (4:24 PM)      Okay can stop a week before. Restart post operatively.     Completed clearance faxed to [phone]

## 2018-12-05 ENCOUNTER — APPOINTMENT (OUTPATIENT)
Dept: PHYSICAL THERAPY | Facility: MEDICAL CENTER | Age: 63
End: 2018-12-05
Attending: NURSE PRACTITIONER
Payer: COMMERCIAL

## 2018-12-11 LAB
ALBUMIN SERPL-MCNC: 4.2 G/DL (ref 3.6–4.8)
ALBUMIN/GLOB SERPL: 1.8 {RATIO} (ref 1.2–2.2)
ALP SERPL-CCNC: 103 IU/L (ref 39–117)
ALT SERPL-CCNC: 39 IU/L (ref 0–32)
AST SERPL-CCNC: 30 IU/L (ref 0–40)
BILIRUB SERPL-MCNC: 0.4 MG/DL (ref 0–1.2)
BUN SERPL-MCNC: 10 MG/DL (ref 8–27)
BUN/CREAT SERPL: 19 (ref 12–28)
CALCIUM SERPL-MCNC: 9.4 MG/DL (ref 8.7–10.3)
CHLORIDE SERPL-SCNC: 93 MMOL/L (ref 96–106)
CHOLEST SERPL-MCNC: 209 MG/DL (ref 100–199)
CO2 SERPL-SCNC: 24 MMOL/L (ref 20–29)
CREAT SERPL-MCNC: 0.53 MG/DL (ref 0.57–1)
GLOBULIN SER CALC-MCNC: 2.3 G/DL (ref 1.5–4.5)
GLUCOSE SERPL-MCNC: 97 MG/DL (ref 65–99)
HDLC SERPL-MCNC: 59 MG/DL
IF AFRICAN AMERICAN  100797: 117 ML/MIN/1.73
IF NON AFRICAN AMER 100791: 101 ML/MIN/1.73
LABORATORY COMMENT REPORT: ABNORMAL
LDLC SERPL CALC-MCNC: 113 MG/DL (ref 0–99)
POTASSIUM SERPL-SCNC: 3.8 MMOL/L (ref 3.5–5.2)
PROT SERPL-MCNC: 6.5 G/DL (ref 6–8.5)
SODIUM SERPL-SCNC: 135 MMOL/L (ref 134–144)
TRIGL SERPL-MCNC: 185 MG/DL (ref 0–149)
VLDLC SERPL CALC-MCNC: 37 MG/DL (ref 5–40)

## 2018-12-12 ENCOUNTER — APPOINTMENT (OUTPATIENT)
Dept: PHYSICAL THERAPY | Facility: MEDICAL CENTER | Age: 63
End: 2018-12-12
Attending: NURSE PRACTITIONER
Payer: COMMERCIAL

## 2018-12-13 ENCOUNTER — TELEPHONE (OUTPATIENT)
Dept: VASCULAR LAB | Facility: MEDICAL CENTER | Age: 63
End: 2018-12-13

## 2018-12-13 ENCOUNTER — OFFICE VISIT (OUTPATIENT)
Dept: VASCULAR LAB | Facility: MEDICAL CENTER | Age: 63
End: 2018-12-13
Attending: FAMILY MEDICINE
Payer: COMMERCIAL

## 2018-12-13 VITALS
HEIGHT: 63 IN | DIASTOLIC BLOOD PRESSURE: 70 MMHG | HEART RATE: 54 BPM | BODY MASS INDEX: 36.06 KG/M2 | WEIGHT: 203.5 LBS | SYSTOLIC BLOOD PRESSURE: 133 MMHG

## 2018-12-13 DIAGNOSIS — I10 ESSENTIAL HYPERTENSION: ICD-10-CM

## 2018-12-13 DIAGNOSIS — I83.92 VARICOSE VEINS OF LEFT LOWER EXTREMITY, UNSPECIFIED WHETHER COMPLICATED: ICD-10-CM

## 2018-12-13 DIAGNOSIS — E66.9 OBESITY (BMI 35.0-39.9 WITHOUT COMORBIDITY): ICD-10-CM

## 2018-12-13 DIAGNOSIS — R73.03 PREDIABETES: ICD-10-CM

## 2018-12-13 DIAGNOSIS — E55.9 VITAMIN D DEFICIENCY: ICD-10-CM

## 2018-12-13 DIAGNOSIS — E78.1 HYPERTRIGLYCERIDEMIA: ICD-10-CM

## 2018-12-13 DIAGNOSIS — E78.5 DYSLIPIDEMIA: ICD-10-CM

## 2018-12-13 PROCEDURE — 99214 OFFICE O/P EST MOD 30 MIN: CPT | Performed by: FAMILY MEDICINE

## 2018-12-13 PROCEDURE — 99212 OFFICE O/P EST SF 10 MIN: CPT | Performed by: FAMILY MEDICINE

## 2018-12-13 RX ORDER — PITAVASTATIN CALCIUM 1.04 MG/1
1 TABLET, FILM COATED ORAL EVERY EVENING
Qty: 90 TAB | Refills: 3 | Status: SHIPPED | OUTPATIENT
Start: 2018-12-13 | End: 2019-12-08

## 2018-12-13 RX ORDER — COLESEVELAM 180 1/1
1250 TABLET ORAL 2 TIMES DAILY WITH MEALS
Qty: 360 TAB | Refills: 3 | Status: SHIPPED | OUTPATIENT
Start: 2018-12-13 | End: 2019-03-25

## 2018-12-13 ASSESSMENT — ENCOUNTER SYMPTOMS
ORTHOPNEA: 0
TREMORS: 0
BLOOD IN STOOL: 0
DEPRESSION: 0
HEADACHES: 0
NAUSEA: 0
DOUBLE VISION: 0
INSOMNIA: 0
SORE THROAT: 0
WEAKNESS: 0
SHORTNESS OF BREATH: 0
PALPITATIONS: 0
CHILLS: 0
FOCAL WEAKNESS: 0
DIARRHEA: 0
DIZZINESS: 0
BRUISES/BLEEDS EASILY: 0
SEIZURES: 0
COUGH: 0
MYALGIAS: 0
BLURRED VISION: 0
VOMITING: 0
HEMOPTYSIS: 0
ABDOMINAL PAIN: 0
NERVOUS/ANXIOUS: 0
FEVER: 0
WHEEZING: 0

## 2018-12-13 NOTE — PROGRESS NOTES
FOLLOW-UP VASCULAR MED VISIT  Subjective:   Sona Thakur is a 62 y.o. female who presents today 17 for   Chief Complaint   Patient presents with   • Follow-Up     Essential Hypertension   Initially referred by PCP (BETITO Dejesus) for eval of varicose veins of the legs in context of Afib.     HPI:    BLE varicosities:  Reports mild relief from compression with more itching.  Has less warmth. D-dimer normal.  Seen by [facility] and to continue current care with f/u 3mo.      Afib:  Dx , had palpitations and heart pauses.  Seen in ED for r/o MI.  Had been on xarelto with significant hemorrhoidal and laceration bleeding.  Hx of ulcers in the family.  Denies use of anticoag.  Using ASA 162mg.  No CVA s/s     Current HTN concerns: Denies  HTN sx:  No current blurred or changed vision, chest pain, shortness of breath, headache, nausea, dizziness/vertigo   Home BP lo/70s  24h ABPM completed: No  Adherence to current HTN meds: compliant all of the time     Antiplatelet/anticoagulation: Yes, Details: tolerating 62mg, no bleeding    Hyperlipidemia: Yes, Details: failed atorvastatin and pravastatin due to myalgias.  Taking cholestyramine with gas distension and bloating.  Using vit D 3000 units.  No prior use of zetia or pitavastatin or niacin.     Pertinent HTN hx:   Age at HTN dx: >5 years   Past HTN medications: only as noted   HTN crises:  No prior hospitalization or crises   Lifestyle factors:     High salt: No   EtOH: No  Interfering substances:     NSAIDs: No    Decongestants. No     Other ASCVD risk factors:     DM: prediabetes identified in the past    CKD:  No   CLAUDE: No   Hypothyroidism: No    History   Smoking Status   • Former Smoker   • Packs/day: 0.50   • Years: 5.00   • Types: Cigars   • Quit date: 2015   Smokeless Tobacco   • Never Used     Social History   Substance Use Topics   • Smoking status: Former Smoker     Packs/day: 0.50     Years: 5.00     Types: Cigars     Quit date: 2015    • Smokeless tobacco: Never Used   • Alcohol use No     Outpatient Encounter Prescriptions as of 12/13/2018   Medication Sig Dispense Refill   • Pitavastatin Calcium 1 MG Tab Take 1 mg by mouth every evening for 360 days. 90 Tab 3   • colesevelam (WELCHOL) 625 MG Tab Take 2 Tabs by mouth 2 times a day, with meals for 360 days. 360 Tab 3   • chlorthalidone (HYGROTON) 25 MG Tab TAKE ONE TABLET BY MOUTH ONCE DAILY 90 Tab 3   • losartan (COZAAR) 50 MG Tab TAKE ONE TABLET BY MOUTH ONCE DAILY 90 Tab 3   • omeprazole (PRILOSEC) 20 MG delayed-release capsule TAKE 1 CAPSULE BY MOUTH ONCE DAILY IN THE MORNING AND 2CAPSULES ONCE DAILY IN THE EVENING 90 Cap 3   • sertraline (ZOLOFT) 100 MG Tab TAKE ONE TABLET BY MOUTH ONCE DAILY 90 Tab 3   • potassium chloride SA (KLOR-CON M10) 10 MEQ Tab CR Take 1 Tab by mouth every day. 90 Tab 3   • acetaminophen/caffeine/butalbital 325-40-50 mg (FIORICET) -40 MG Tab Take 1 Tab by mouth every four hours as needed for Headache. 30 Tab 1   • aspirin (ASA) 325 MG Tab Take 325 mg by mouth every day.     • ascorbic acid (ASCORBIC ACID) 500 MG Tab Take 500 mg by mouth every day.     • calcium-vitamin D (CALCIUM 500 + D) 500-125 MG-UNIT Tab Take 1 Tab by mouth every day.     • Omega-3 Fatty Acids (FISH OIL) 1000 MG Cap capsule Take 1 Cap by mouth 3 times a day, with meals. 90 Cap 0   • Cholecalciferol (VITAMIN D) 2000 UNITS Cap Take 1 Cap by mouth every day. (Patient taking differently: Take 2,000 Units by mouth 2 Times a Day.) 30 Cap 0   • acetaminophen (TYLENOL) 325 MG Tab Take 2 Tabs by mouth every four hours as needed. 30 Tab 0   • timolol (TIMOPTIC) 0.5 % SOLN Place 1 Drop in both eyes 2 times a day.     • travoprost (TRAVOPROST) 0.004 % SOLN Place 1 Drop in left eye every evening.     • ezetimibe (ZETIA) 10 MG Tab Take 1 Tab by mouth every day for 360 days. (Patient not taking: Reported on 12/13/2018) 90 Tab 3   • [DISCONTINUED] cholestyramine (QUESTRAN) 4 g packet DISSOLVE & MIX POWDER  "IN LIQUID AND TAKE BY MOUTH TWICE DAILY 180 Each 3     No facility-administered encounter medications on file as of 12/13/2018.      Allergies   Allergen Reactions   • Allegra      \"Gave me a bad headache   • Lipitor [Atorvastatin Calcium]      \"Hurts my bones\"   • Pravastatin      Back, legs and hip pain     DIET AND EXERCISE:  Weight Change: none   Diet: low fat  Exercise: minimal exercise     Review of Systems   Constitutional: Negative for chills, fever and malaise/fatigue.   HENT: Negative for nosebleeds, sore throat and tinnitus.    Eyes: Negative for blurred vision and double vision.   Respiratory: Negative for cough, hemoptysis, shortness of breath and wheezing.    Cardiovascular: Positive for leg swelling (mostly after extended standing). Negative for chest pain, palpitations and orthopnea.   Gastrointestinal: Negative for abdominal pain, blood in stool, diarrhea, melena, nausea and vomiting.   Genitourinary: Negative for hematuria.   Musculoskeletal: Negative for joint pain and myalgias.   Skin: Negative for itching and rash.   Neurological: Negative for dizziness, tremors, focal weakness, seizures, weakness and headaches.   Endo/Heme/Allergies: Does not bruise/bleed easily.   Psychiatric/Behavioral: Negative for depression. The patient is not nervous/anxious and does not have insomnia.       Objective:     Vitals:    12/13/18 0806 12/13/18 0811   BP: 132/79 133/70   BP Location: Left arm Left arm   Patient Position: Sitting Sitting   BP Cuff Size: Adult Adult   Pulse: (!) 50 (!) 54   Weight: 92.3 kg (203 lb 8 oz) 92.3 kg (203 lb 8 oz)   Height: 1.6 m (5' 3\") 1.6 m (5' 3\")      BP Readings from Last 4 Encounters:   12/13/18 133/70   10/29/18 126/70   09/24/18 110/78   05/14/18 116/72      Body mass index is 36.05 kg/m².   BMI Readings from Last 4 Encounters:   12/13/18 36.05 kg/m²   10/29/18 35.23 kg/m²   09/24/18 34.90 kg/m²   05/14/18 34.55 kg/m²      Physical Exam   Constitutional: She is oriented to " person, place, and time. She appears well-developed and well-nourished. No distress.   HENT:   Head: Normocephalic and atraumatic.   Neck: Normal range of motion. Neck supple. No JVD present. No thyromegaly present.   Cardiovascular: Normal rate, regular rhythm, normal heart sounds and intact distal pulses.  Exam reveals no gallop and no friction rub.    No murmur heard.  Pulses:       Carotid pulses are 2+ on the right side, and 2+ on the left side.       Radial pulses are 2+ on the right side, and 2+ on the left side.        Dorsalis pedis pulses are 2+ on the right side, and 2+ on the left side.        Posterior tibial pulses are 2+ on the right side, and 2+ on the left side.   Edema     RLE: none     LLE: none   Spider telangectasia:       RLE:  None      LLE: none   Varicosities:         RLE: none      LLE: none   Corona phlebectatica:      RLE:  None       LLE:  None   Cording:         RLE:  None     LLE: None      Pulmonary/Chest: Effort normal and breath sounds normal.   Abdominal: Soft. Bowel sounds are normal. She exhibits no distension and no mass. There is no tenderness. There is no rebound and no guarding.   Musculoskeletal: Normal range of motion. She exhibits no edema or tenderness.   Neurological: She is alert and oriented to person, place, and time. No cranial nerve deficit. She exhibits normal muscle tone. Coordination normal.   Skin: Skin is warm and dry. She is not diaphoretic.   Psychiatric: She has a normal mood and affect.     Lab Results   Component Value Date    CHOLSTRLTOT 209 (H) 12/10/2018     (H) 12/10/2018    HDL 59 12/10/2018    TRIGLYCERIDE 185 (H) 12/10/2018         Lab Results   Component Value Date    HBA1C 5.7 (H) 09/17/2018      Lab Results   Component Value Date    SODIUM 135 12/10/2018    POTASSIUM 3.8 12/10/2018    CHLORIDE 93 (L) 12/10/2018    CO2 24 12/10/2018    GLUCOSE 97 12/10/2018    BUN 10 12/10/2018    CREATININE 0.53 (L) 12/10/2018    BUNCREATRAT 19 12/10/2018     IFAFRICA 117 12/10/2018    IFNOTAFR 101 12/10/2018        Results for GERI MARCIAL (MRN [id number]) as of 12/13/2018 08:31   Ref. Range 11/13/2018 10:20   D-Dimer Screen Latest Ref Range: 0.00 - 0.49 mg/L FEU 0.22     VASCULAR IMAGING:     BLE reflux duplex 11/9/18  Right:   A branch of the greater saphenous vein at the mid-distal thigh demonstrates    reflux for a maximum time exceeding 9.5 seconds.   At the proximal posterior thigh, a  connecting the femoral vein    to the superficial venous system demonstrates reflux for a maximum time of    3.8 seconds.   No evidence of deep venous reflux.   No evidence of deep or superficial venous thrombosis.     Left:   Trace deep venous reflux is demonstrated in the common femoral vein at the    saphenofemoral junction for a maximum time of 2.4 seconds.   Trace superficial venous reflux is demonstrated in the greater saphenous    vein at the saphenofemoral junction for a maximum time of 1.1 seconds.   No evidence of deep or superficial venous thrombosis.      Medical Decision Making:  Today's Assessment / Status / Plan:     1. Essential hypertension     2. Varicose veins of left lower extremity, unspecified whether complicated     3. Prediabetes     4. Hypertriglyceridemia     5. Obesity (BMI 35.0-39.9 without comorbidity) (HCC)     6. Dyslipidemia  Lipid Profile    CRP HIGH SENSITIVE (CARDIAC)    COMP METABOLIC PANEL   7. Vitamin D deficiency  VITAMIN D,25 HYDROXY     Patient Type: Primary Prevention    Etiology of Established CVD if Present: hx of Afib     Lipid Management: Qualifies for Statin Therapy Based on 2013 ACC/AHA Guidelines: yes  Calculated 10-Year Risk of ASCVD: N/A  Currently on Statin: No  NLA Risk Category:   High:  3+ major RFs, T1/T2D and 0-1 RF and no evidence of end-org damage  Tx threshold: non-HDL-C >129, LDL-C >99  Tx goal:  non-HDL <130, LDL-C <100 (optional: apoB<90)   At goal:  no  Tx plan:   - start pitavastatin 1mg every other day  until next visit   - change to welchol 1250mg BID, stop questran   - consider addition of zetia if not at goal next visit   - labs in 6 weeks with hsCRP   - add Cholest-off    Blood Pressure Management:Goal: ACC/AHA (2017) goal <130/80  Home BP at goal:  yes  Office BP at goal:  yes  Plan:   - continue home BP monitoring, reviewed correct technique:  Yes   - order 24h ABPM:  NO  - continue chlorthalidone 25mg daily   - continue losartan 50mg daily, increase to 100mg if BP not controlled     Glycemic Status: Prediabetic  - continue healthy diet, weight reduction, daily physical activity   - consider metformin up to 850mg BID to slow or offset progression to T2D as per DPP trial   - monitor labs Q6-12mo    Anti-Platelet/Anti-Coagulant Tx: yes.  Hx of Afib that is rate-controlled.  SIS-VASC score = 2 pt.  2 points  Stroke risk was 2.2% per year in >90,000 patients (the [place] Atrial Fibrillation Cohort Study) and 2.9% risk of stroke/TIA/systemic embolism.  Meets criteria for full anticoagulation but has had major GIB and is not willing to restart DOAC or VKA.  She is aware of elevated CVA risk w/o anticoagulation despite use of full-strength anticoag.    - continue ASA 162mg  - defer Afib and decisions for anticoag in the future to cardiology     Smoking: continued complete avoidance of all tobacco products      Physical Activity: engage in moderate to vigorous physical activity such as brisk walking, swimming, cycling, >150 minutes per week at 30-40 minutes per session, 3 to 5 times weekly    Weight Management and Nutrition: Dietary plan was discussed with patient at this visit including - consume diet that emphasizes intake of vegetables, fruits, and whole grains,  - use low fat diary products, poultry, fish, legumes, nontropical vegetable oils, nuts  - limit intake of seets, sugar-sweetned beverages, and red meats   - reduce saturated and trans fats to <6% of your daily calories   - consume no more than 2,400mg of  sodium daily (look at food labels)  - healthy diet plans reviewed including plate method, DASH, AHA diets     Other:   1) BLE varicosities, mild symptomatic, right > left superficial venous reflux disease w/o DVT   - continue compression   - during air travel to walk hourly and complete isometric leg stretches  - f/u with [facility] for possible interventions     2) Afib - as noted above, defer to cardiology and discuss anticoag      3) obesity - as noted above     4) Vit D deficiency - continue Vitamin D3 but increase to 4000 units minimum, check lab in 6weeks     Instructed to follow-up with PCP for remainder of adult medical needs: yes  We will partner with other providers in the management of established vascular disease and cardiometabolic risk factors.    Studies to Be Obtained:  None   Labs to Be Obtained:   CMP, lipid, hsCRP, vit D in 6 weeks     Follow up in: 2 mo with me    Abhinav Uribe M.D.

## 2018-12-13 NOTE — PATIENT INSTRUCTIONS
1) follow-up with [facility], continue compression, they will consider intervention   2) stop Questran, start Welchol   3) pitavastatin 1mg every other day for now   4) vitamin D 9504-6887 daily  5) check labs in 6 weeks after starting new meds   6) we will keep zetia on reserve   7) aspirin 162mg for now, talk with cardiologist about blood thinners

## 2018-12-13 NOTE — TELEPHONE ENCOUNTER
PA for both Colesevelam and Livalo have been approved.     Colesevelam -  Approved from 11/22/18 - 12/13/19    Livalo - Approved from 11/13/18 - 12/13/19      Joaquin Gao, [facility]

## 2018-12-14 ENCOUNTER — PHYSICAL THERAPY (OUTPATIENT)
Dept: PHYSICAL THERAPY | Facility: MEDICAL CENTER | Age: 63
End: 2018-12-14
Attending: NURSE PRACTITIONER
Payer: COMMERCIAL

## 2018-12-14 DIAGNOSIS — M25.511 CHRONIC RIGHT SHOULDER PAIN: ICD-10-CM

## 2018-12-14 DIAGNOSIS — G89.29 CHRONIC RIGHT SHOULDER PAIN: ICD-10-CM

## 2018-12-14 PROCEDURE — 97140 MANUAL THERAPY 1/> REGIONS: CPT

## 2018-12-14 PROCEDURE — 97110 THERAPEUTIC EXERCISES: CPT

## 2018-12-14 RX ORDER — OMEPRAZOLE 20 MG/1
CAPSULE, DELAYED RELEASE ORAL
Qty: 90 CAP | Refills: 3 | Status: SHIPPED | OUTPATIENT
Start: 2018-12-14 | End: 2019-04-17 | Stop reason: SDUPTHER

## 2018-12-14 NOTE — OP THERAPY DAILY TREATMENT
Outpatient Physical Therapy  DAILY TREATMENT     [facility] Outpatient Physical Therapy  [address]  [place] [address]  Phone:  [phone]  Fax:  [phone]    Date: 12/14/2018    Patient: Sona Thakur  YOB: 1955  MRN: [id number]     Time Calculation  Start time: 1505  Stop time: 1535 Time Calculation (min): 30 minutes     Chief Complaint: No chief complaint on file.    Visit #: 6    SUBJECTIVE:  The patient reports that her shoulder has been feeling good. She rarely gets symptoms. She would like to finish up PT before the holidays. She will continue to perform the exercises at home.     OBJECTIVE:  Current objective measures:   Quickdash General Total Score: 9.09       Therapeutic Exercises (CPT 87929):     1. Nustepper L1 5 min    2. Wall pushups`, 10 x 1    3. Shoulder ext, pink 10x2    4. Row , pink  10x 2    5. Wall slides with foam roller, 15 x 2      Therapeutic Exercise Summary: HEP (pt to try sidelying ER at home)  Access Code: [email or web address]   URL: [email or web address]   Date: 11/20/2018   Prepared by: Liana Abdoul      Exercises  · Standing Scapular Retraction - 10 reps - 3 sets - 1x daily - 7x weekly  · Standing Shoulder External Rotation with Resistance - 10 reps - 3 sets - 1x daily - 7x weekly    Access Code: [email or web address]   URL: [email or web address]   Date: 11/20/2018   Prepared by: Liana Abdoul      Exercises  · Standing Shoulder Row with Anchored Resistance - 10-15 reps - 3 sets - 1x daily - 7x weekly  · Shoulder Extension with Resistance - 10-15 reps - 3 sets - 1x daily - 7x weekly              Therapeutic Treatments and Modalities:     1. Manual Therapy (CPT 91997), Gentle mobs-GRII-III AP mobs with sustained pressure into GRIII-IV; PROM right shoulder across all planes, 8    Time-based treatments/modalities:  Manual therapy minutes (CPT 13580): 15 minutes  Therapeutic exercise minutes (CPT 97201): 15 minutes       Pain rating  before treatment: 0  Pain rating after treatment: 0    Goals:   Short Term Goals:   R shoulder AROM without provocation of symptoms MET  Patient is able to don/doff t-shirt without provocation of symptoms MET with Modifications         Long Term Goals:    QuickDASH score improved >/= MCID MET  Patient is able to perform household chores, with improved body mechanics, as evidenced by patient simulation of same without cueing MET  Pain</= 1/10 with activity MET     Long term goal time span:  6-8 weeks    ASSESSMENT:   Response to treatment: The patient has been seen for 6 visits to Physical Therapy to date to treat impairments and functional limitations associated with R shoulder impingement syndrome. The patient has met all PT goals for this episode of care, and she would like to discharge to independent HEP. HEP reviewed at this session, patient ready for discharge.    PLAN/RECOMMENDATIONS:   Plan for treatment: discharge patient due to accomplished goals.

## 2018-12-17 ENCOUNTER — APPOINTMENT (OUTPATIENT)
Dept: PHYSICAL THERAPY | Facility: MEDICAL CENTER | Age: 63
End: 2018-12-17
Attending: NURSE PRACTITIONER
Payer: COMMERCIAL

## 2018-12-18 ENCOUNTER — APPOINTMENT (RX ONLY)
Dept: URBAN - METROPOLITAN AREA CLINIC 31 | Facility: CLINIC | Age: 63
Setting detail: DERMATOLOGY
End: 2018-12-18

## 2018-12-18 DIAGNOSIS — L57.0 ACTINIC KERATOSIS: ICD-10-CM

## 2018-12-18 DIAGNOSIS — D18.0 HEMANGIOMA: ICD-10-CM

## 2018-12-18 PROBLEM — D18.01 HEMANGIOMA OF SKIN AND SUBCUTANEOUS TISSUE: Status: ACTIVE | Noted: 2018-12-18

## 2018-12-18 PROCEDURE — 99212 OFFICE O/P EST SF 10 MIN: CPT | Mod: 25

## 2018-12-18 PROCEDURE — ? COUNSELING

## 2018-12-18 PROCEDURE — 17000 DESTRUCT PREMALG LESION: CPT

## 2018-12-18 PROCEDURE — ? LIQUID NITROGEN

## 2018-12-18 ASSESSMENT — LOCATION SIMPLE DESCRIPTION DERM: LOCATION SIMPLE: RIGHT CHEEK

## 2018-12-18 ASSESSMENT — LOCATION ZONE DERM: LOCATION ZONE: FACE

## 2018-12-18 ASSESSMENT — LOCATION DETAILED DESCRIPTION DERM: LOCATION DETAILED: RIGHT LATERAL MALAR CHEEK

## 2019-01-08 ENCOUNTER — TELEPHONE (OUTPATIENT)
Dept: PHYSICAL THERAPY | Facility: MEDICAL CENTER | Age: 64
End: 2019-01-08

## 2019-01-08 NOTE — OP THERAPY DISCHARGE SUMMARY
"  Outpatient Physical Therapy  DISCHARGE SUMMARY NOTE      [facility] Outpatient Physical Therapy  [address]  [place] [address]  Phone:  [phone]  Fax:  [phone]    Date of Visit: 01/08/2019    Patient: Sona Thakur  YOB: 1955  MRN: [id number]     Referring Provider:  JOSS Fountain    Referring Diagnosis  Chronic right shoulder pain M25.511, G89.29      Physical Therapy Occurrence Codes    Date of onset of impairment:  9/24/18   Date physical therapy care plan established or reviewed:  11/14/18   Date physical therapy treatment started:  11/14/18          Your patient is being discharged from Physical Therapy with the following comments:   · Goals met    Comments:  Pt was last seen on 12/14/18 and was discharged \"due to goals met\" but a formal discharge was not written. Pt will now be formally discharged.     Recommendations:  Please refer this patient back if any further needs arise. Thank you!    Liana Schreiber, PT, DPT    Date: 1/8/2019  "

## 2019-01-22 ENCOUNTER — OFFICE VISIT (OUTPATIENT)
Dept: CARDIOLOGY | Facility: MEDICAL CENTER | Age: 64
End: 2019-01-22
Payer: COMMERCIAL

## 2019-01-22 VITALS
WEIGHT: 203 LBS | HEART RATE: 74 BPM | SYSTOLIC BLOOD PRESSURE: 122 MMHG | HEIGHT: 63 IN | DIASTOLIC BLOOD PRESSURE: 84 MMHG | BODY MASS INDEX: 35.97 KG/M2 | OXYGEN SATURATION: 96 %

## 2019-01-22 DIAGNOSIS — I10 ESSENTIAL HYPERTENSION: ICD-10-CM

## 2019-01-22 DIAGNOSIS — I47.10 SVT (SUPRAVENTRICULAR TACHYCARDIA): ICD-10-CM

## 2019-01-22 DIAGNOSIS — I48.0 PAF (PAROXYSMAL ATRIAL FIBRILLATION) (HCC): ICD-10-CM

## 2019-01-22 PROCEDURE — 93000 ELECTROCARDIOGRAM COMPLETE: CPT | Performed by: INTERNAL MEDICINE

## 2019-01-22 PROCEDURE — 99214 OFFICE O/P EST MOD 30 MIN: CPT | Performed by: INTERNAL MEDICINE

## 2019-01-22 RX ORDER — LATANOPROST 50 UG/ML
SOLUTION/ DROPS OPHTHALMIC
COMMUNITY
Start: 2019-01-14

## 2019-01-22 RX ORDER — TIMOLOL MALEATE 5 MG/ML
SOLUTION OPHTHALMIC
COMMUNITY
Start: 2019-01-17

## 2019-01-22 NOTE — PROGRESS NOTES
"Arrhythmia Clinic Note (Established patient)    DOS: 1/22/2019    Chief complaint/Reason for consult: F/u pAF    Interval History:  Patient is a 62 yo WF. PAF s/p x 2 ablations, last one with Carter Spencer. She followed up with me several months ago and was still complaining of intermittent palpitations. She was also unable to tolerate OAC and had just been on ASA. We decided to assess burden of AF with outpatient monitoring. Robino showing on small runs of nonsustained AT. These are symptomatic for her but short lived minimally bothersome she says.    ROS (+ highlighted in red):  Constitutional: Fevers/chills/fatigue/weightloss  Respiratory: Shortness of breath/cough  Cardiovascular: Chest pain/palpitations/edema/orthopnea/syncope    Past Medical History:   Diagnosis Date   • Allergy    • Anesthesia     nausea and vomitting   • Arthritis     osteoarthritis, right knee, left hand   • Atrial fibrillation (HCC) 2/15/2013   • GERD (gastroesophageal reflux disease)    • Glaucoma     left eye   • Heart burn    • High cholesterol    • Hypertension    • Unspecified cataract     early stages       Allergies   Allergen Reactions   • Allegra      \"Gave me a bad headache   • Lipitor [Atorvastatin Calcium]      \"Hurts my bones\"   • Pravastatin      Back, legs and hip pain       Current Outpatient Prescriptions   Medication Sig Dispense Refill   • latanoprost (XALATAN) 0.005 % Solution      • aspirin EC (ECOTRIN) 81 MG Tablet Delayed Response Take 162 mg by mouth every day.     • omeprazole (PRILOSEC) 20 MG delayed-release capsule TAKE 1 CAPSULE BY MOUTH ONCE DAILY IN THE MORNING AND 2CAPSULES ONCE DAILY IN THE EVENING 90 Cap 3   • Pitavastatin Calcium 1 MG Tab Take 1 mg by mouth every evening for 360 days. 90 Tab 3   • colesevelam (WELCHOL) 625 MG Tab Take 2 Tabs by mouth 2 times a day, with meals for 360 days. 360 Tab 3   • chlorthalidone (HYGROTON) 25 MG Tab TAKE ONE TABLET BY MOUTH ONCE DAILY 90 Tab 3   • losartan (COZAAR) 50 MG " "Tab TAKE ONE TABLET BY MOUTH ONCE DAILY 90 Tab 3   • sertraline (ZOLOFT) 100 MG Tab TAKE ONE TABLET BY MOUTH ONCE DAILY 90 Tab 3   • potassium chloride SA (KLOR-CON M10) 10 MEQ Tab CR Take 1 Tab by mouth every day. 90 Tab 3   • ascorbic acid (ASCORBIC ACID) 500 MG Tab Take 500 mg by mouth every day.     • calcium-vitamin D (CALCIUM 500 + D) 500-125 MG-UNIT Tab Take 1 Tab by mouth every day.     • Omega-3 Fatty Acids (FISH OIL) 1000 MG Cap capsule Take 1 Cap by mouth 3 times a day, with meals. 90 Cap 0   • Cholecalciferol (VITAMIN D) 2000 UNITS Cap Take 1 Cap by mouth every day. (Patient taking differently: Take 2,000 Units by mouth 2 Times a Day.) 30 Cap 0   • acetaminophen (TYLENOL) 325 MG Tab Take 2 Tabs by mouth every four hours as needed. 30 Tab 0   • timolol (TIMOPTIC) 0.5 % SOLN Place 1 Drop in both eyes 2 times a day.     • timolol gel-forming (TIMOPTIC-XR) 0.5 % GEL FORMING SOLUTION      • ezetimibe (ZETIA) 10 MG Tab Take 1 Tab by mouth every day for 360 days. (Patient not taking: Reported on 12/13/2018) 90 Tab 3   • acetaminophen/caffeine/butalbital 325-40-50 mg (FIORICET) -40 MG Tab Take 1 Tab by mouth every four hours as needed for Headache. 30 Tab 1   • aspirin (ASA) 325 MG Tab Take 325 mg by mouth every day.     • travoprost (TRAVOPROST) 0.004 % SOLN Place 1 Drop in left eye every evening.       No current facility-administered medications for this visit.        Physical Exam:  Vitals:    01/22/19 0813   BP: 122/84   BP Location: Left arm   Patient Position: Sitting   BP Cuff Size: Adult   Pulse: 74   SpO2: 96%   Weight: 92.1 kg (203 lb)   Height: 1.6 m (5' 3\")     General appearance: NAD, conversant   Eyes: anicteric sclerae, moist conjunctivae; no lid-lag; PERRLA  HENT: Atraumatic; oropharynx clear with moist mucous membranes and no mucosal ulcerations; normal hard and soft palate  Neck: Trachea midline; FROM, supple, no thyromegaly or lymphadenopathy  Lungs: CTA, with normal respiratory effort " and no intercostal retractions  CV: RRR, no MRGs, no JVD  Abdomen: Soft, non-tender; no masses or HSM  Extremities: No peripheral edema or extremity lymphadenopathy  Skin: Normal temperature, turgor and texture; no rash, ulcers or subcutaneous nodules  Psych: Appropriate affect, alert and oriented to person, place and time    Data:  Labs reviewed    Prior echo/stress reviewed:  Preserved function, prior ETT negative for ischemia    EKG interpreted by me:  NSR    Zio tracings reviewed    Impression/Plan:  1. PAF (paroxysmal atrial fibrillation) ([facility])  EKG   2. SVT (supraventricular tachycardia) ([facility])     3. Essential hypertension       -Doing well, no further AF  -I think we can stay off the ASA if she is not tolerating despite her higher CHADSVasc as I cannot see pursuing an aggressive ANDRES closure approach in setting of 0% AF burden  -BP well controlled  -F/u in 12 mo, sooner if recurrence    Norbert Richardosn MD

## 2019-01-23 LAB — EKG IMPRESSION: NORMAL

## 2019-02-12 LAB
25(OH)D3+25(OH)D2 SERPL-MCNC: 55.5 NG/ML (ref 30–100)
ALBUMIN SERPL-MCNC: 4.5 G/DL (ref 3.6–4.8)
ALBUMIN/GLOB SERPL: 2 {RATIO} (ref 1.2–2.2)
ALP SERPL-CCNC: 102 IU/L (ref 39–117)
ALT SERPL-CCNC: 28 IU/L (ref 0–32)
AST SERPL-CCNC: 24 IU/L (ref 0–40)
BILIRUB SERPL-MCNC: 0.4 MG/DL (ref 0–1.2)
BUN SERPL-MCNC: 10 MG/DL (ref 8–27)
BUN/CREAT SERPL: 19 (ref 12–28)
CALCIUM SERPL-MCNC: 9.3 MG/DL (ref 8.7–10.3)
CHLORIDE SERPL-SCNC: 93 MMOL/L (ref 96–106)
CHOLEST SERPL-MCNC: 174 MG/DL (ref 100–199)
CO2 SERPL-SCNC: 23 MMOL/L (ref 20–29)
CREAT SERPL-MCNC: 0.54 MG/DL (ref 0.57–1)
CRP SERPL HS-MCNC: 7.68 MG/L (ref 0–3)
GLOBULIN SER CALC-MCNC: 2.3 G/DL (ref 1.5–4.5)
GLUCOSE SERPL-MCNC: 94 MG/DL (ref 65–99)
HDLC SERPL-MCNC: 60 MG/DL
LABORATORY COMMENT REPORT: ABNORMAL
LDLC SERPL CALC-MCNC: 78 MG/DL (ref 0–99)
POTASSIUM SERPL-SCNC: 3.5 MMOL/L (ref 3.5–5.2)
PROT SERPL-MCNC: 6.8 G/DL (ref 6–8.5)
SODIUM SERPL-SCNC: 134 MMOL/L (ref 134–144)
TRIGL SERPL-MCNC: 180 MG/DL (ref 0–149)
VLDLC SERPL CALC-MCNC: 36 MG/DL (ref 5–40)

## 2019-02-15 ENCOUNTER — APPOINTMENT (OUTPATIENT)
Dept: VASCULAR LAB | Facility: MEDICAL CENTER | Age: 64
End: 2019-02-15
Payer: COMMERCIAL

## 2019-03-10 DIAGNOSIS — R73.01 IFG (IMPAIRED FASTING GLUCOSE): ICD-10-CM

## 2019-03-11 ENCOUNTER — TELEPHONE (OUTPATIENT)
Dept: VASCULAR LAB | Facility: MEDICAL CENTER | Age: 64
End: 2019-03-11

## 2019-03-11 ENCOUNTER — OFFICE VISIT (OUTPATIENT)
Dept: VASCULAR LAB | Facility: MEDICAL CENTER | Age: 64
End: 2019-03-11
Attending: FAMILY MEDICINE
Payer: COMMERCIAL

## 2019-03-11 VITALS
HEART RATE: 57 BPM | WEIGHT: 204.1 LBS | BODY MASS INDEX: 36.16 KG/M2 | DIASTOLIC BLOOD PRESSURE: 77 MMHG | SYSTOLIC BLOOD PRESSURE: 125 MMHG | HEIGHT: 63 IN

## 2019-03-11 DIAGNOSIS — R51.9 INCREASED FREQUENCY OF HEADACHES: ICD-10-CM

## 2019-03-11 DIAGNOSIS — I48.0 PAROXYSMAL ATRIAL FIBRILLATION (HCC): ICD-10-CM

## 2019-03-11 DIAGNOSIS — I83.92 VARICOSE VEINS OF LEFT LOWER EXTREMITY, UNSPECIFIED WHETHER COMPLICATED: ICD-10-CM

## 2019-03-11 DIAGNOSIS — I10 ESSENTIAL HYPERTENSION: ICD-10-CM

## 2019-03-11 DIAGNOSIS — R42 DIZZINESS: ICD-10-CM

## 2019-03-11 DIAGNOSIS — R73.03 PREDIABETES: ICD-10-CM

## 2019-03-11 DIAGNOSIS — E78.1 HYPERTRIGLYCERIDEMIA: ICD-10-CM

## 2019-03-11 DIAGNOSIS — E66.9 OBESITY (BMI 35.0-39.9 WITHOUT COMORBIDITY): ICD-10-CM

## 2019-03-11 DIAGNOSIS — E78.5 DYSLIPIDEMIA: ICD-10-CM

## 2019-03-11 PROCEDURE — 99214 OFFICE O/P EST MOD 30 MIN: CPT | Performed by: FAMILY MEDICINE

## 2019-03-11 PROCEDURE — 99212 OFFICE O/P EST SF 10 MIN: CPT | Performed by: FAMILY MEDICINE

## 2019-03-11 ASSESSMENT — ENCOUNTER SYMPTOMS
SORE THROAT: 0
COUGH: 0
HEMOPTYSIS: 0
ORTHOPNEA: 0
DOUBLE VISION: 0
DIZZINESS: 0
SEIZURES: 0
BLOOD IN STOOL: 0
INSOMNIA: 0
CHILLS: 0
WHEEZING: 0
NAUSEA: 0
WEAKNESS: 0
FEVER: 0
DEPRESSION: 0
VOMITING: 0
MYALGIAS: 0
DIARRHEA: 0
SHORTNESS OF BREATH: 0
TREMORS: 0
BLURRED VISION: 0
NERVOUS/ANXIOUS: 0
HEADACHES: 0
FOCAL WEAKNESS: 0
PALPITATIONS: 0
BRUISES/BLEEDS EASILY: 0
ABDOMINAL PAIN: 0

## 2019-03-11 NOTE — PROGRESS NOTES
FOLLOW-UP VASCULAR MED VISIT  Subjective:   Sona Thakur is a 62 y.o. female who presents today 18 for   Chief Complaint   Patient presents with   • Follow-Up     Essential Hypertension   Initially referred by PCP (BETITO Dejesus) for eval of varicose veins of the legs in context of Afib.     HPI:    BLE varicosities:  Has complete vein [place] has completed interventions and pending f/u ultrasound.  Mild bruising, no residuals. Mild aching.  Using compression socks.     Afib:  Dx , no current anticoag, Using ASA 162mg.  No focal CVA signs but having intermittent HAs with increasing frequency, mostly left-sided.  No associated photophobia but  Gets dizziness, no prior carotid studies or MRI completed.    HTN:  Current HTN concerns: Denies  ADRs: no   HTN sx:  No current blurred or changed vision, chest pain, shortness of breath, headache, nausea, dizziness/vertigo   Home BP lo/70s  24h ABPM completed: No  Adherence to current HTN meds: compliant all of the time     Antiplatelet/anticoagulation: Yes, Details: tolerating 162mg, no bleeding    Hyperlipidemia:  Stable, no current concerns  Current treatment: Livalo 1mg QOD, welchol BID  Myalgias? mild since taking livalo but tolerable , started contemporanous to Livalo start.  Denies overt soreness or tenderness to touch, no skin changes  Other adverse drug reactions? No  Lipid profile:   LDL (mg/dL)   Date Value   2019 78   12/10/2018 113 (H)   2013 122 (H)   2011 126 (H)     HDL (mg/dL)   Date Value   2019 60   12/10/2018 59   2013 55   2011 75     Pertinent HTN hx (reviewed at initial visit):   Age at HTN dx: >5 years   Past HTN medications: only as noted   HTN crises:  No prior hospitalization or crises   Lifestyle factors:     High salt: No   EtOH: No  Interfering substances:     NSAIDs: No    Decongestants. No   Other ASCVD risk factors:     DM: prediabetes identified in the past    CKD:  No   CLAUDE:  No   Hypothyroidism: No    History   Smoking Status   • Former Smoker   • Packs/day: 0.50   • Years: 5.00   • Types: Cigars   • Quit date: 1/28/2015   Smokeless Tobacco   • Never Used     Social History   Substance Use Topics   • Smoking status: Former Smoker     Packs/day: 0.50     Years: 5.00     Types: Cigars     Quit date: 1/28/2015   • Smokeless tobacco: Never Used   • Alcohol use No     Outpatient Encounter Prescriptions as of 3/11/2019   Medication Sig Dispense Refill   • latanoprost (XALATAN) 0.005 % Solution      • timolol gel-forming (TIMOPTIC-XR) 0.5 % GEL FORMING SOLUTION      • aspirin EC (ECOTRIN) 81 MG Tablet Delayed Response Take 162 mg by mouth every day.     • omeprazole (PRILOSEC) 20 MG delayed-release capsule TAKE 1 CAPSULE BY MOUTH ONCE DAILY IN THE MORNING AND 2CAPSULES ONCE DAILY IN THE EVENING 90 Cap 3   • Pitavastatin Calcium 1 MG Tab Take 1 mg by mouth every evening for 360 days. 90 Tab 3   • colesevelam (WELCHOL) 625 MG Tab Take 2 Tabs by mouth 2 times a day, with meals for 360 days. 360 Tab 3   • ezetimibe (ZETIA) 10 MG Tab Take 1 Tab by mouth every day for 360 days. 90 Tab 3   • chlorthalidone (HYGROTON) 25 MG Tab TAKE ONE TABLET BY MOUTH ONCE DAILY 90 Tab 3   • losartan (COZAAR) 50 MG Tab TAKE ONE TABLET BY MOUTH ONCE DAILY 90 Tab 3   • sertraline (ZOLOFT) 100 MG Tab TAKE ONE TABLET BY MOUTH ONCE DAILY 90 Tab 3   • potassium chloride SA (KLOR-CON M10) 10 MEQ Tab CR Take 1 Tab by mouth every day. 90 Tab 3   • acetaminophen/caffeine/butalbital 325-40-50 mg (FIORICET) -40 MG Tab Take 1 Tab by mouth every four hours as needed for Headache. 30 Tab 1   • ascorbic acid (ASCORBIC ACID) 500 MG Tab Take 500 mg by mouth every day.     • calcium-vitamin D (CALCIUM 500 + D) 500-125 MG-UNIT Tab Take 1 Tab by mouth every day.     • Omega-3 Fatty Acids (FISH OIL) 1000 MG Cap capsule Take 1 Cap by mouth 3 times a day, with meals. 90 Cap 0   • Cholecalciferol (VITAMIN D) 2000 UNITS Cap Take 1 Cap  "by mouth every day. (Patient taking differently: Take 2,000 Units by mouth 2 Times a Day.) 30 Cap 0   • acetaminophen (TYLENOL) 325 MG Tab Take 2 Tabs by mouth every four hours as needed. 30 Tab 0   • timolol (TIMOPTIC) 0.5 % SOLN Place 1 Drop in both eyes 2 times a day.     • travoprost (TRAVOPROST) 0.004 % SOLN Place 1 Drop in left eye every evening.     • [DISCONTINUED] aspirin (ASA) 325 MG Tab Take 325 mg by mouth every day.       No facility-administered encounter medications on file as of 3/11/2019.      Allergies   Allergen Reactions   • Allegra      \"Gave me a bad headache   • Lipitor [Atorvastatin Calcium]      \"Hurts my bones\"   • Pravastatin      Back, legs and hip pain     DIET AND EXERCISE:  Weight Change: none   BMI Readings from Last 4 Encounters:   03/11/19 36.15 kg/m²   01/22/19 35.96 kg/m²   12/13/18 36.05 kg/m²   10/29/18 35.23 kg/m²      Diet: low fat  Exercise: minimal exercise     Review of Systems   Constitutional: Negative for chills, fever and malaise/fatigue.   HENT: Negative for nosebleeds, sore throat and tinnitus.    Eyes: Negative for blurred vision and double vision.   Respiratory: Negative for cough, hemoptysis, shortness of breath and wheezing.    Cardiovascular: Positive for leg swelling (mostly after extended standing). Negative for chest pain, palpitations and orthopnea.   Gastrointestinal: Negative for abdominal pain, blood in stool, diarrhea, melena, nausea and vomiting.   Genitourinary: Negative for hematuria.   Musculoskeletal: Negative for joint pain and myalgias.   Skin: Negative for itching and rash.   Neurological: Negative for dizziness, tremors, focal weakness, seizures, weakness and headaches.   Endo/Heme/Allergies: Does not bruise/bleed easily.   Psychiatric/Behavioral: Negative for depression. The patient is not nervous/anxious and does not have insomnia.       Objective:     Vitals:    03/11/19 1101   BP: 125/77   BP Location: Left arm   Patient Position: Sitting " "  BP Cuff Size: Adult   Pulse: (!) 57   Weight: 92.6 kg (204 lb 1.6 oz)   Height: 1.6 m (5' 3\")      BP Readings from Last 4 Encounters:   03/11/19 125/77   01/22/19 122/84   12/13/18 133/70   10/29/18 126/70      Body mass index is 36.15 kg/m².     Physical Exam   Constitutional: She is oriented to person, place, and time. She appears well-developed and well-nourished. No distress.   HENT:   Head: Normocephalic and atraumatic.   Neck: Normal range of motion. Neck supple. No JVD present. No thyromegaly present.   Cardiovascular: Normal rate, regular rhythm, normal heart sounds and intact distal pulses.  Exam reveals no gallop and no friction rub.    No murmur heard.  Pulses:       Carotid pulses are 2+ on the right side, and 2+ on the left side.       Radial pulses are 2+ on the right side, and 2+ on the left side.        Dorsalis pedis pulses are 2+ on the right side, and 2+ on the left side.        Posterior tibial pulses are 2+ on the right side, and 2+ on the left side.   Edema     RLE: none     LLE: none   Spider telangectasia:       RLE:  None      LLE: none   Varicosities:         RLE: none      LLE: none   Corona phlebectatica:      RLE:  None       LLE:  None   Cording:         RLE:  None     LLE: None      Pulmonary/Chest: Effort normal and breath sounds normal.   Abdominal: Soft. Bowel sounds are normal. She exhibits no distension and no mass. There is no tenderness. There is no rebound and no guarding.   Musculoskeletal: Normal range of motion. She exhibits no edema or tenderness.   Neurological: She is alert and oriented to person, place, and time. No cranial nerve deficit. She exhibits normal muscle tone. Coordination normal.   Skin: Skin is warm and dry. She is not diaphoretic.   Psychiatric: She has a normal mood and affect.     Lab Results   Component Value Date    CHOLSTRLTOT 174 02/11/2019    LDL 78 02/11/2019    HDL 60 02/11/2019    TRIGLYCERIDE 180 (H) 02/11/2019         Lab Results   Component " Value Date    HBA1C 5.7 (H) 09/17/2018      Lab Results   Component Value Date    SODIUM 134 02/11/2019    POTASSIUM 3.5 02/11/2019    CHLORIDE 93 (L) 02/11/2019    CO2 23 02/11/2019    GLUCOSE 94 02/11/2019    BUN 10 02/11/2019    CREATININE 0.54 (L) 02/11/2019    BUNCREATRAT 19 02/11/2019    IFAFRICA 115 02/11/2019    IFNOTAFR 100 02/11/2019        Results for GERI MARCIAL (MRN [id number]) as of 12/13/2018 08:31   Ref. Range 11/13/2018 10:20   D-Dimer Screen Latest Ref Range: 0.00 - 0.49 mg/L FEU 0.22     VASCULAR IMAGING:     BLE reflux duplex 11/9/18  Right:   A branch of the greater saphenous vein at the mid-distal thigh demonstrates    reflux for a maximum time exceeding 9.5 seconds.   At the proximal posterior thigh, a  connecting the femoral vein    to the superficial venous system demonstrates reflux for a maximum time of    3.8 seconds.   No evidence of deep venous reflux.   No evidence of deep or superficial venous thrombosis.     Left:   Trace deep venous reflux is demonstrated in the common femoral vein at the    saphenofemoral junction for a maximum time of 2.4 seconds.   Trace superficial venous reflux is demonstrated in the greater saphenous    vein at the saphenofemoral junction for a maximum time of 1.1 seconds.   No evidence of deep or superficial venous thrombosis.      Medical Decision Making:  Today's Assessment / Status / Plan:     1. Essential hypertension     2. Dyslipidemia  CREATINE KINASE    Comp Metabolic Panel    Comp Metabolic Panel    Lipid Profile   3. Hypertriglyceridemia     4. Varicose veins of left lower extremity, unspecified whether complicated     5. Prediabetes     6. Obesity (BMI 35.0-39.9 without comorbidity) (HCC)     7. BMI 35.0-35.9,adult     8. Dizziness  US-CAROTID DOPPLER BILAT   9. Increased frequency of headaches  US-CAROTID DOPPLER BILAT   10. Paroxysmal atrial fibrillation (HCC)  US-CAROTID DOPPLER BILAT     Patient Type: Primary  Prevention    Etiology of Established CVD if Present: hx of Afib     Lipid Management: Qualifies for Statin Therapy Based on 2013 ACC/AHA Guidelines: yes  Calculated 10-Year Risk of ASCVD: N/A  Currently on Statin: yes  Failed atorvastatin and pravastatin due to myalgias.    Taking cholestyramine led to gas distension and bloating.    Using vit D 3000 units.  No prior use of zetia or pitavastatin or niacin.   hsCRP = 7.68, indicating higher risk, and could intensify statin tx   Has associated hypertrig   NLA Risk Category:   High:  3+ major RFs  Tx goal:  non-HDL <130, LDL-C <100 (optional: apoB<90)   At goal:  yes  Tx plan:   - increase pitavastatin 1mg to daily dosing and monitor myalgias closely, has PAR through 12/2019   - check CK levels in 1mo to establish baseline  - continue welchol 1250mg BID, has PAR through 12/2019   - consider addition of zetia if not at goal next visit   - continue Cholest-off  - repeat CMP, lipids in about 6mo to verify stability     Blood Pressure Management:Goal: ACC/AHA (2017) goal <130/80  Home BP at goal:  yes  Office BP at goal:  yes  Plan:   - continue home BP monitoring, reviewed correct technique:  Yes   - order 24h ABPM:  NO  - continue chlorthalidone 25mg daily   - continue losartan 50mg daily, increase to 100mg if BP not controlled     Glycemic Status: Prediabetic  - continue healthy diet, weight reduction, daily physical activity   - consider metformin up to 850mg BID to slow or offset progression to T2D as per DPP trial   - monitor labs Q6-12mo    Anti-Platelet/Anti-Coagulant Tx: yes.  Hx of Afib that is rate-controlled.  SIS-VASC score = 2 pt.  2 points  Stroke risk was 2.2% per year in >90,000 patients (the [place] Atrial Fibrillation Cohort Study) and 2.9% risk of stroke/TIA/systemic embolism.  Meets criteria for full anticoagulation but has had major GIB and is not willing to restart DOAC or VKA.  She is aware of elevated CVA risk w/o anticoagulation despite use of  full-strength anticoag.    Plan:  - continue ASA 162mg  - defer Afib and decisions for anticoag in the future to cardiology     Smoking: continued complete avoidance of all tobacco products      Physical Activity: continue healthy activity to improve CV fitness, see care instructions     Weight Management and Nutrition: Dietary plan was discussed with patient at this visit including DASH and AHA diets, low sodium as outlined in care instructions     Other:   1) BLE varicosities, mild symptomatic, right > left superficial venous reflux disease w/o DVT   - continue compression   - during air travel to walk hourly and complete isometric leg stretches  - ongoing care with [facility]     2) Afib - as noted above, defer to cardiology and discuss anticoag    - continue ASA for now     3) obesity - as noted above     4) Vit D deficiency - resolved 2/11/19,   - increase vit D3 to 4000 units daily to offset statin myalgias potenially    5) hypertrig, mild  - recommend kcal reduction, reduced fried/fatty foods, avoid etoh  - monitor routinely, see care instructions for diet info    6) increasing frequency of unilateral HA, mild dizz. Does not appear to be related to hypotension.  No prior carotid w/u.   - recommend prompt attention for CVA s/s  - check carotid u/s  - defer additional w/u to PCP for ongoing care     Instructed to follow-up with PCP for remainder of adult medical needs: yes  We will partner with other providers in the management of established vascular disease and cardiometabolic risk factors.    Studies to Be Obtained:  Carotid   Labs to Be Obtained: CK in 3-4 weeks, CMP, lipid in 6mo    Follow up in: 6mo with AAMIR Uribe M.D.

## 2019-03-11 NOTE — PATIENT INSTRUCTIONS
1) OTC supplements:  Cholest-off and CoQ10 (free ubiquinone)   2) increase livalo 1mg daily for 2-3 weeks, then check labs   3) continue all other meds   4) recheck cholesterol 6mo prior to next appointment   5) check carotid artery ultrasound     Physical activity:  - engage in moderate to vigorous physical activity such as brisk walking, swimming, cycling, >150 minutes per week at 30-40 minutes per session, 3 to 5 times weekly or as directed at today's visit     General healthly nutrition advice:  - the USDA food pattern (https://www.cnpp.usda.gov/USDAFoodPatterns)  - plate method (https://www.choosemyplate.gov/)  - consume diet that emphasizes intake of vegetables, fruits, and whole grains,  - use low fat diary products, poultry, fish, legumes, nontropical vegetable oils, nuts  - limit intake of sweets, sugar-sweetned beverages, and red meats   - reduce saturated and trans fats to <6% of your daily calories   - consume no more than 2,400mg of sodium daily (look at food labels) or if you have high BP then reduce to no more than 1,500mg of sodium daily     BP lowering diet:   - DASH diet (https://www.heart.org/en/health-topics/high-blood-pressure/changes-you-can-make-to-manage-high-blood-pressure/managing-blood-pressure-with-a-heart-healthy-diet)    Cholesterol-reducing diets:   - AHA diet  (http://www.heart.org/en/healthy-living/healthy-eating/eat-smart/nutrition-basics/aha-diet-and-lifestyle-recommendations)   - Mediterranean diet (http://www.heart.org/en/healthy-living/healthy-eating/eat-smart/nutrition-basics/mediterranean-diet)   - lowering triglycerides: (http://my.americanheart.org/idc/groups/ahamah-public/@wc/@sop/@[facility]/documents/downloadable/[facility]_425988.pdf)

## 2019-03-11 NOTE — TELEPHONE ENCOUNTER
Patient was seen by Dr. Uribe today and was given a 28-day supply of Xarelto 20mg in office.    Mathieu Buchanan. [facility]

## 2019-03-19 ENCOUNTER — HOSPITAL ENCOUNTER (OUTPATIENT)
Dept: RADIOLOGY | Facility: MEDICAL CENTER | Age: 64
End: 2019-03-19
Attending: FAMILY MEDICINE
Payer: COMMERCIAL

## 2019-03-19 DIAGNOSIS — R51.9 INCREASED FREQUENCY OF HEADACHES: ICD-10-CM

## 2019-03-19 DIAGNOSIS — I48.0 PAROXYSMAL ATRIAL FIBRILLATION (HCC): ICD-10-CM

## 2019-03-19 DIAGNOSIS — R42 DIZZINESS: ICD-10-CM

## 2019-03-19 LAB
ALBUMIN/CREAT UR: 3.9 MG/G CREAT (ref 0–30)
CREAT UR-MCNC: 141.1 MG/DL
HBA1C MFR BLD: 5.6 % (ref 4.8–5.6)
MICROALBUMIN UR-MCNC: 5.5 UG/ML

## 2019-03-19 PROCEDURE — 93880 EXTRACRANIAL BILAT STUDY: CPT

## 2019-03-22 ENCOUNTER — TELEPHONE (OUTPATIENT)
Dept: VASCULAR LAB | Facility: MEDICAL CENTER | Age: 64
End: 2019-03-22

## 2019-03-22 NOTE — TELEPHONE ENCOUNTER
Carotid duplex reviewed.  No significant blockage  Will not need further imaging unless new symptoms.    Michael Bloch, MD  [facility]

## 2019-03-24 PROBLEM — M15.9 PRIMARY OSTEOARTHRITIS INVOLVING MULTIPLE JOINTS: Status: ACTIVE | Noted: 2019-03-24

## 2019-03-24 PROBLEM — R73.01 IFG (IMPAIRED FASTING GLUCOSE): Status: ACTIVE | Noted: 2019-03-24

## 2019-03-25 ENCOUNTER — OFFICE VISIT (OUTPATIENT)
Dept: MEDICAL GROUP | Facility: MEDICAL CENTER | Age: 64
End: 2019-03-25
Payer: COMMERCIAL

## 2019-03-25 VITALS
SYSTOLIC BLOOD PRESSURE: 120 MMHG | WEIGHT: 203 LBS | TEMPERATURE: 97.6 F | BODY MASS INDEX: 35.97 KG/M2 | OXYGEN SATURATION: 92 % | HEIGHT: 63 IN | DIASTOLIC BLOOD PRESSURE: 68 MMHG | HEART RATE: 83 BPM

## 2019-03-25 DIAGNOSIS — N95.9 POST MENOPAUSAL PROBLEMS: ICD-10-CM

## 2019-03-25 DIAGNOSIS — Z12.31 ENCOUNTER FOR SCREENING MAMMOGRAM FOR MALIGNANT NEOPLASM OF BREAST: ICD-10-CM

## 2019-03-25 DIAGNOSIS — I10 HYPERTENSION, ESSENTIAL: ICD-10-CM

## 2019-03-25 DIAGNOSIS — R00.2 HEART PALPITATIONS: ICD-10-CM

## 2019-03-25 DIAGNOSIS — R79.89 ELEVATED LFTS: ICD-10-CM

## 2019-03-25 DIAGNOSIS — E87.6 HYPOKALEMIA: ICD-10-CM

## 2019-03-25 DIAGNOSIS — E78.5 HYPERLIPIDEMIA LDL GOAL <70: ICD-10-CM

## 2019-03-25 PROCEDURE — 99214 OFFICE O/P EST MOD 30 MIN: CPT | Performed by: NURSE PRACTITIONER

## 2019-03-25 RX ORDER — PITAVASTATIN CALCIUM 1.04 MG/1
1 TABLET, FILM COATED ORAL
Qty: 30 TAB | Refills: 0
Start: 2019-03-25 | End: 2019-09-17 | Stop reason: SDUPTHER

## 2019-03-25 RX ORDER — COLESEVELAM 180 1/1
1250 TABLET ORAL 2 TIMES DAILY WITH MEALS
Qty: 360 TAB | Refills: 3
Start: 2019-03-25 | End: 2019-09-17 | Stop reason: SDUPTHER

## 2019-03-25 RX ORDER — VALSARTAN 80 MG/1
80 TABLET ORAL DAILY
Qty: 30 TAB | Refills: 3 | Status: SHIPPED | OUTPATIENT
Start: 2019-03-25 | End: 2019-07-23 | Stop reason: SDUPTHER

## 2019-03-25 NOTE — PROGRESS NOTES
Subjective:     Sona Thakur is a 63 y.o. female who presents with palps.    HPI:   Seen in f/u for palps.  She was having irregular palps a lot yesterday.  She hasn't had any in long term.  Hx of ablation for afib. Will see cardiac in spring or sooner if palps continue.  She has had both her shingrix.  She had her losartan recalled.  She is off the med.  Will change to valsartan 80 mg.    She has low K+ chronic.  Will take K+ if sx of cramping or more palps.  Recheck K+ in 2 wks after change.     She is due mammo and dexa.  She has seen cardiac.  The gave her livalo and welchol.  She was initiallly on livalo every other day.  They tried to inc to daily but she had myalgias.  Has had same s/e to multiple statins. Her goal is <70.  Her LP in feb with cardiac showed great HDL. TRG sl up at 180.  LDL was 78.  He wants lower.    [facility].  There was a single left -SGPT at 39.  This is on livalo.  K+ was 3.2.  She is on daily K+.      Patient Active Problem List    Diagnosis Date Noted   • Primary osteoarthritis involving multiple joints 03/24/2019   • IFG (impaired fasting glucose) 03/24/2019   • Anesthesia    • Dyslipidemia 03/11/2019   • Varicose veins of left lower extremity, unspecified whether complicated 03/11/2019   • Obesity (BMI 35.0-39.9 without comorbidity) ([facility]) 11/15/2017   • Nasal step visual field defect of both eyes 10/11/2017   • Family history of breast cancer 09/15/2017   • Breast nodule 09/15/2017   • Cataract    • Glaucoma    • Atrial fibrillation (HCC) 02/15/2013   • GERD (gastroesophageal reflux disease) 05/22/2012   • Allergic rhinitis 05/22/2012   • Hypertension 05/22/2012   • Preventative health care 11/09/2010       Current medicines (including changes today)  Current Outpatient Prescriptions   Medication Sig Dispense Refill   • valsartan (DIOVAN) 80 MG Tab Take 1 Tab by mouth every day. 30 Tab 3   • Pitavastatin Calcium (LIVALO) 1 MG Tab Take 1 mg by mouth every 48 hours. 30 Tab  "0   • colesevelam (WELCHOL) 625 MG Tab Take 2 Tabs by mouth 2 times a day, with meals for 360 days. 360 Tab 3   • latanoprost (XALATAN) 0.005 % Solution      • timolol gel-forming (TIMOPTIC-XR) 0.5 % GEL FORMING SOLUTION      • aspirin EC (ECOTRIN) 81 MG Tablet Delayed Response Take 162 mg by mouth every day.     • omeprazole (PRILOSEC) 20 MG delayed-release capsule TAKE 1 CAPSULE BY MOUTH ONCE DAILY IN THE MORNING AND 2CAPSULES ONCE DAILY IN THE EVENING 90 Cap 3   • Pitavastatin Calcium 1 MG Tab Take 1 mg by mouth every evening for 360 days. 90 Tab 3   • chlorthalidone (HYGROTON) 25 MG Tab TAKE ONE TABLET BY MOUTH ONCE DAILY 90 Tab 3   • sertraline (ZOLOFT) 100 MG Tab TAKE ONE TABLET BY MOUTH ONCE DAILY 90 Tab 3   • potassium chloride SA (KLOR-CON M10) 10 MEQ Tab CR Take 1 Tab by mouth every day. 90 Tab 3   • acetaminophen/caffeine/butalbital 325-40-50 mg (FIORICET) -40 MG Tab Take 1 Tab by mouth every four hours as needed for Headache. 30 Tab 1   • ascorbic acid (ASCORBIC ACID) 500 MG Tab Take 500 mg by mouth every day.     • calcium-vitamin D (CALCIUM 500 + D) 500-125 MG-UNIT Tab Take 1 Tab by mouth every day.     • Omega-3 Fatty Acids (FISH OIL) 1000 MG Cap capsule Take 1 Cap by mouth 3 times a day, with meals. 90 Cap 0   • Cholecalciferol (VITAMIN D) 2000 UNITS Cap Take 1 Cap by mouth every day. (Patient taking differently: Take 2,000 Units by mouth 2 Times a Day.) 30 Cap 0   • acetaminophen (TYLENOL) 325 MG Tab Take 2 Tabs by mouth every four hours as needed. 30 Tab 0   • timolol (TIMOPTIC) 0.5 % SOLN Place 1 Drop in both eyes 2 times a day.     • travoprost (TRAVOPROST) 0.004 % SOLN Place 1 Drop in left eye every evening.       No current facility-administered medications for this visit.        Allergies   Allergen Reactions   • Allegra      \"Gave me a bad headache   • Lipitor [Atorvastatin Calcium]      \"Hurts my bones\"   • Pravastatin      Back, legs and hip pain   • Voltaren [Diclofenac]  " "      ROS  Constitutional: Negative. Negative for fever, chills, weight loss, malaise/fatigue and diaphoresis.   HENT: Negative. Negative for hearing loss, ear pain, nosebleeds, congestion, sore throat, neck pain, tinnitus and ear discharge.   Respiratory: Negative. Negative for cough, hemoptysis, sputum production, shortness of breath, wheezing and stridor.   Cardiovascular: Negative. Negative for chest pain, orthopnea, claudication, leg swelling and PND.   Gastrointestinal: Denies nausea, vomiting, diarrhea, constipation, heartburn, melena or hematochezia.  Genitourinary: Denies dysuria, hematuria, urinary incontinence, frequency or urgency.        Objective:     Blood pressure 120/68, pulse 83, temperature 36.4 °C (97.6 °F), temperature source Temporal, height 1.6 m (5' 3\"), weight 92.1 kg (203 lb), SpO2 92 %, not currently breastfeeding. Body mass index is 35.96 kg/m².    Physical Exam:  Vitals reviewed.  Constitutional: Oriented to person, place, and time. appears well-developed and well-nourished. No distress.   Cardiovascular: Normal rate, regular rhythm, normal heart sounds and intact distal pulses. Exam reveals no gallop and no friction rub. No murmur heard. No carotid bruits.   Pulmonary/Chest: Effort normal and breath sounds normal. No stridor. No respiratory distress. no wheezes or rales. exhibits no tenderness.   Musculoskeletal: Normal range of motion. exhibits no edema. taran pedal pulses 2+.  Lymphadenopathy: No cervical or supraclavicular adenopathy.   Neurological: Alert and oriented to person, place, and time. exhibits normal muscle tone.  Skin: Skin is warm and dry. No diaphoresis.   Psychiatric: Normal mood and affect. Behavior is normal.      Assessment and Plan:     The following treatment plan was discussed:    1. Heart palpitations  valsartan (DIOVAN) 80 MG Tab    prob f/u afib or other atrial tach d/t off losartan and low k+.  continue daily k+.  change to valsartan. f/u 6 weeks for lab " review and bp ck   2. Elevated LFTs  Comp Metabolic Panel    single abn on livalo.  recheck lab and f/u 6 wks   3. Hypertension, essential  valsartan (DIOVAN) 80 MG Tab    dc losartan d/t recall.  chg to valsartan 80 mg daily.  f/u 6 wks for bp check   4. Hypokalemia  valsartan (DIOVAN) 80 MG Tab    continue daily k+.  recheck CMP after chg to valsartan in 2 weeks.     5. Hyperlipidemia LDL goal <70  Pitavastatin Calcium (LIVALO) 1 MG Tab    colesevelam (WELCHOL) 625 MG Tab    Comp Metabolic Panel    on livalo every toher day with some cramping.  also single elevated lft.  will recheck in 2 weeks.  f/u 6 wks for lab review and bp check.  f/u w/cardiac as ramon   6. Encounter for screening mammogram for malignant neoplasm of breast  MA-SCREEN MAMMO W/CAD-BILAT   7. Post menopausal problems  DS-BONE DENSITY STUDY (DEXA)         Followup: Return in about 6 weeks (around 5/6/2019).

## 2019-04-09 ENCOUNTER — HOSPITAL ENCOUNTER (OUTPATIENT)
Dept: RADIOLOGY | Facility: MEDICAL CENTER | Age: 64
End: 2019-04-09
Attending: NURSE PRACTITIONER
Payer: COMMERCIAL

## 2019-04-09 DIAGNOSIS — N95.9 POST MENOPAUSAL PROBLEMS: ICD-10-CM

## 2019-04-09 DIAGNOSIS — Z12.31 ENCOUNTER FOR SCREENING MAMMOGRAM FOR MALIGNANT NEOPLASM OF BREAST: ICD-10-CM

## 2019-04-09 PROCEDURE — 77063 BREAST TOMOSYNTHESIS BI: CPT

## 2019-04-09 PROCEDURE — 77080 DXA BONE DENSITY AXIAL: CPT

## 2019-06-24 RX ORDER — OMEPRAZOLE 20 MG/1
20 CAPSULE, DELAYED RELEASE ORAL DAILY
Qty: 90 CAP | Refills: 2 | Status: SHIPPED | OUTPATIENT
Start: 2019-06-24 | End: 2019-06-26 | Stop reason: SDUPTHER

## 2019-06-25 ENCOUNTER — TELEPHONE (OUTPATIENT)
Dept: MEDICAL GROUP | Facility: MEDICAL CENTER | Age: 64
End: 2019-06-25

## 2019-06-25 DIAGNOSIS — K21.00 GERD WITH ESOPHAGITIS: ICD-10-CM

## 2019-06-25 NOTE — TELEPHONE ENCOUNTER
Pt called ststes she takes a total of 3 omeprazole a day. 1 in am and 2 in pm. Pt is asking if she can get quantity 90 for 30 days?

## 2019-06-26 RX ORDER — OMEPRAZOLE 20 MG/1
CAPSULE, DELAYED RELEASE ORAL
Qty: 90 CAP | Refills: 0 | Status: SHIPPED | OUTPATIENT
Start: 2019-06-26 | End: 2019-08-19 | Stop reason: SDUPTHER

## 2019-06-26 NOTE — TELEPHONE ENCOUNTER
That refill was sent to the pharmacy already but that is too much prilosec.  If she is on that much med she needs to see and be followed by GI.

## 2019-07-23 DIAGNOSIS — I10 HYPERTENSION, ESSENTIAL: ICD-10-CM

## 2019-07-23 DIAGNOSIS — R00.2 HEART PALPITATIONS: ICD-10-CM

## 2019-07-23 DIAGNOSIS — E87.6 HYPOKALEMIA: ICD-10-CM

## 2019-07-23 RX ORDER — VALSARTAN 80 MG/1
TABLET ORAL
Qty: 90 TAB | Refills: 3 | Status: SHIPPED | OUTPATIENT
Start: 2019-07-23 | End: 2019-09-17 | Stop reason: SDUPTHER

## 2019-08-19 RX ORDER — OMEPRAZOLE 20 MG/1
CAPSULE, DELAYED RELEASE ORAL
Qty: 90 CAP | Refills: 0 | Status: SHIPPED | OUTPATIENT
Start: 2019-08-19 | End: 2019-12-11

## 2019-09-09 ENCOUNTER — OFFICE VISIT (OUTPATIENT)
Dept: CARDIOLOGY | Facility: MEDICAL CENTER | Age: 64
End: 2019-09-09
Payer: COMMERCIAL

## 2019-09-09 ENCOUNTER — TELEPHONE (OUTPATIENT)
Dept: CARDIOLOGY | Facility: MEDICAL CENTER | Age: 64
End: 2019-09-09

## 2019-09-09 VITALS
SYSTOLIC BLOOD PRESSURE: 110 MMHG | HEART RATE: 75 BPM | DIASTOLIC BLOOD PRESSURE: 72 MMHG | OXYGEN SATURATION: 94 % | WEIGHT: 203 LBS | BODY MASS INDEX: 35.97 KG/M2 | HEIGHT: 63 IN

## 2019-09-09 DIAGNOSIS — I10 ESSENTIAL HYPERTENSION: ICD-10-CM

## 2019-09-09 DIAGNOSIS — I48.0 PAF (PAROXYSMAL ATRIAL FIBRILLATION) (HCC): ICD-10-CM

## 2019-09-09 DIAGNOSIS — E78.5 DYSLIPIDEMIA: ICD-10-CM

## 2019-09-09 LAB — EKG IMPRESSION: NORMAL

## 2019-09-09 PROCEDURE — 99214 OFFICE O/P EST MOD 30 MIN: CPT | Performed by: INTERNAL MEDICINE

## 2019-09-09 PROCEDURE — 93000 ELECTROCARDIOGRAM COMPLETE: CPT | Performed by: INTERNAL MEDICINE

## 2019-09-09 RX ORDER — POTASSIUM CHLORIDE 750 MG/1
10 TABLET, FILM COATED, EXTENDED RELEASE ORAL
Refills: 3 | COMMUNITY
Start: 2019-08-15 | End: 2021-03-09 | Stop reason: SDUPTHER

## 2019-09-09 NOTE — PROGRESS NOTES
Arrhythmia Clinic Note (Established patient)    DOS: 9/9/2019    Chief complaint/Reason for consult: PAF    Interval History:  Patient is a 64 yo WF. History of PAF s/p x 2 ablations with Dr. Carter Spencer. Subsequently had issues with continued OAC due to history of GI ulcers. Had considered ANDRES closure but ambulatory monitoring showed no further AF burden. She is currently on . No palpitations anymore. Under some stress as her  had another stroke and has some dementia. Planning on going to [place] in October.    ROS (+ highlighted in red):  Constitutional: Fevers/chills/fatigue/weightloss  HEENT: Blurry vision/eye pain/sore throat/hearing loss  Respiratory: Shortness of breath/cough  Cardiovascular: Chest pain/palpitations/edema/orthopnea/syncope  GI: Nausea/vomitting/diarrhea  MSK: Arthralgias/myagias/muscle weakness  Skin: Rash/sores  Neurological: Numbness/tremors/vertigo  Endocrine: Excessive thirst/polyuria/cold intolerance/heat intolerance  Psych: Depression/anxiety    Past Medical History:   Diagnosis Date   • Allergy    • Anesthesia     nausea and vomitting   • Arthritis     osteoarthritis, right knee, left hand   • Atrial fibrillation (HCC) 2/15/2013   • Breast nodule 9/15/2017   • Dyslipidemia    • Family history of breast cancer 9/15/2017   • GERD (gastroesophageal reflux disease)    • Glaucoma     left eye   • Hypertension    • IFG (impaired fasting glucose) 3/24/2019   • Nasal step visual field defect of both eyes 10/11/2017   • Obesity (BMI 35.0-39.9 without comorbidity) ([facility]) 11/15/2017   • Unspecified cataract     early stages   • Varicose veins of left lower extremity, unspecified whether complicated 3/11/2019       Past Surgical History:   Procedure Laterality Date   • BROW LIFT Bilateral 10/11/2017    Procedure: BROW LIFT - FOR DIRECT BROWPLASTY;  Surgeon: Nitish Martínez M.D.;  Location: SURGERY SAME DAY [facility];  Service: Ophthalmology   • BLEPHAROPLASTY Bilateral 10/11/2017     Procedure: BLEPHAROPLASTY - UPPER LID;  Surgeon: Nitish Martínez M.D.;  Location: SURGERY SAME DAY [facility];  Service: Ophthalmology   • CATARACT PHACO WITH IOL Right 2017    Procedure: CATARACT PHACO WITH IOL;  Surgeon: Yuan Sousa M.D.;  Location: SURGERY SURGICAL [facility];  Service:    • CATARACT PHACO WITH IOL Left 2017    Procedure: CATARACT PHACO WITH IOL;  Surgeon: Yuan Sousa M.D.;  Location: SURGERY SURGICAL [facility];  Service:    • RECOVERY  2015    Procedure: A-FIB ABLATION CARTO LRG GRP ICD: 427.31 ZAMARRIPA;  Surgeon: Recoveryonly Surgery;  Location: SURGERY PRE-POST PROC UNIT [facility];  Service:    • RECOVERY  2014    Performed by Cath-Recovery Surgery at SURGERY SAME DAY [facility]   • OTHER CARDIAC SURGERY  2014    ablation   • ABDOMINAL HYSTERECTOMY TOTAL      FAUSTINO/BSO for prolapse   • TONSILLECTOMY AND ADENOIDECTOMY         Social History     Socioeconomic History   • Marital status:      Spouse name: Not on file   • Number of children: Not on file   • Years of education: Not on file   • Highest education level: Not on file   Occupational History   • Not on file   Social Needs   • Financial resource strain: Not on file   • Food insecurity:     Worry: Not on file     Inability: Not on file   • Transportation needs:     Medical: Not on file     Non-medical: Not on file   Tobacco Use   • Smoking status: Former Smoker     Packs/day: 0.50     Years: 5.00     Pack years: 2.50     Types: Cigars     Last attempt to quit: 2015     Years since quittin.6   • Smokeless tobacco: Never Used   Substance and Sexual Activity   • Alcohol use: No     Alcohol/week: 0.0 oz   • Drug use: No   • Sexual activity: Yes     Partners: Male   Lifestyle   • Physical activity:     Days per week: Not on file     Minutes per session: Not on file   • Stress: Not on file   Relationships   • Social connections:     Talks on phone: Not on file     Gets together: Not on file      "Attends Spiritism service: Not on file     Active member of club or organization: Not on file     Attends meetings of clubs or organizations: Not on file     Relationship status: Not on file   • Intimate partner violence:     Fear of current or ex partner: Not on file     Emotionally abused: Not on file     Physically abused: Not on file     Forced sexual activity: Not on file   Other Topics Concern   • Not on file   Social History Narrative   • Not on file       Family History   Problem Relation Age of Onset   • Hypertension Mother    • Cancer Father    • Hypertension Sister    • Heart Disease Maternal Grandmother        Allergies   Allergen Reactions   • Allegra      \"Gave me a bad headache   • Lipitor [Atorvastatin Calcium]      \"Hurts my bones\"   • Pravastatin      Back, legs and hip pain   • Voltaren [Diclofenac]        Current Outpatient Medications   Medication Sig Dispense Refill   • omeprazole (PRILOSEC) 20 MG delayed-release capsule Take 1 cap am and 2 caps pm 90 Cap 0   • valsartan (DIOVAN) 80 MG Tab TAKE 1 TABLET BY MOUTH ONCE DAILY 90 Tab 3   • sertraline (ZOLOFT) 100 MG Tab TAKE 1 TABLET BY MOUTH ONCE DAILY 90 Tab 3   • Pitavastatin Calcium (LIVALO) 1 MG Tab Take 1 mg by mouth every 48 hours. 30 Tab 0   • colesevelam (WELCHOL) 625 MG Tab Take 2 Tabs by mouth 2 times a day, with meals for 360 days. 360 Tab 3   • latanoprost (XALATAN) 0.005 % Solution      • timolol gel-forming (TIMOPTIC-XR) 0.5 % GEL FORMING SOLUTION      • aspirin EC (ECOTRIN) 81 MG Tablet Delayed Response Take 162 mg by mouth every day.     • chlorthalidone (HYGROTON) 25 MG Tab TAKE ONE TABLET BY MOUTH ONCE DAILY 90 Tab 3   • acetaminophen/caffeine/butalbital 325-40-50 mg (FIORICET) -40 MG Tab Take 1 Tab by mouth every four hours as needed for Headache. 30 Tab 1   • ascorbic acid (ASCORBIC ACID) 500 MG Tab Take 500 mg by mouth every day.     • calcium-vitamin D (CALCIUM 500 + D) 500-125 MG-UNIT Tab Take 1 Tab by mouth every day. " "    • Omega-3 Fatty Acids (FISH OIL) 1000 MG Cap capsule Take 1 Cap by mouth 3 times a day, with meals. 90 Cap 0   • Cholecalciferol (VITAMIN D) 2000 UNITS Cap Take 1 Cap by mouth every day. (Patient taking differently: Take 2,000 Units by mouth 2 Times a Day.) 30 Cap 0   • acetaminophen (TYLENOL) 325 MG Tab Take 2 Tabs by mouth every four hours as needed. 30 Tab 0   • timolol (TIMOPTIC) 0.5 % SOLN Place 1 Drop in both eyes 2 times a day.     • potassium chloride ER (KLOR-CON) 10 MEQ tablet Take 10 mEq by mouth.  3   • potassium chloride SA (K-DUR) 10 MEQ Tab CR TAKE 1 TABLET BY MOUTH ONCE DAILY (Patient not taking: Reported on 9/9/2019) 90 Tab 3   • Pitavastatin Calcium 1 MG Tab Take 1 mg by mouth every evening for 360 days. (Patient not taking: Reported on 9/9/2019) 90 Tab 3   • travoprost (TRAVOPROST) 0.004 % SOLN Place 1 Drop in left eye every evening.       No current facility-administered medications for this visit.        Physical Exam:  Vitals:    09/09/19 1508   BP: 110/72   BP Location: Left arm   Patient Position: Sitting   BP Cuff Size: Adult   Pulse: 75   SpO2: 94%   Weight: 92.1 kg (203 lb)   Height: 1.6 m (5' 3\")     General appearance: NAD, conversant   Eyes: anicteric sclerae, moist conjunctivae; no lid-lag; PERRLA  HENT: Atraumatic; oropharynx clear with moist mucous membranes and no mucosal ulcerations; normal hard and soft palate  Neck: Trachea midline; FROM, supple, no thyromegaly or lymphadenopathy  Lungs: CTA, with normal respiratory effort and no intercostal retractions  CV: RRR, no MRGs, no JVD  Abdomen: Soft, non-tender; no masses or HSM  Extremities: No peripheral edema or extremity lymphadenopathy  Skin: Normal temperature, turgor and texture; no rash, ulcers or subcutaneous nodules  Psych: Appropriate affect, alert and oriented to person, place and time    Data:  [facility]    Prior echo/stress reviewed  Preserved function    EKG interpreted by " me:  NSR    Impression/Plan:  1. PAF (paroxysmal atrial fibrillation) (HCC)  EKG   2. Essential hypertension     3. Dyslipidemia       -Doing well  -Remains in sinus as far as I can tell   -BP well controlled  -I am going to reduce ASA down to 81 a day  -I wished her luck on her trip to [facility], f/u in 6 mo     Norbert Richardson MD

## 2019-09-09 NOTE — TELEPHONE ENCOUNTER
Dr. Keyon Powers scheduled pt. For EGD on 9-26-19 and is requesting cardiac clearance.  To Dr. Richardson.    Dr. Moffett  [phone] fax

## 2019-09-12 ENCOUNTER — TELEPHONE (OUTPATIENT)
Dept: MEDICAL GROUP | Facility: MEDICAL CENTER | Age: 64
End: 2019-09-12

## 2019-09-12 LAB
ALBUMIN SERPL-MCNC: 4.3 G/DL (ref 3.6–4.8)
ALBUMIN SERPL-MCNC: 4.4 G/DL (ref 3.6–4.8)
ALBUMIN/GLOB SERPL: 1.9 {RATIO} (ref 1.2–2.2)
ALBUMIN/GLOB SERPL: 1.9 {RATIO} (ref 1.2–2.2)
ALP SERPL-CCNC: 86 IU/L (ref 39–117)
ALP SERPL-CCNC: 87 IU/L (ref 39–117)
ALT SERPL-CCNC: 23 IU/L (ref 0–32)
ALT SERPL-CCNC: 23 IU/L (ref 0–32)
AST SERPL-CCNC: 19 IU/L (ref 0–40)
AST SERPL-CCNC: 22 IU/L (ref 0–40)
BILIRUB SERPL-MCNC: 0.4 MG/DL (ref 0–1.2)
BILIRUB SERPL-MCNC: 0.4 MG/DL (ref 0–1.2)
BUN SERPL-MCNC: 10 MG/DL (ref 8–27)
BUN SERPL-MCNC: 11 MG/DL (ref 8–27)
BUN/CREAT SERPL: 16 (ref 12–28)
BUN/CREAT SERPL: 19 (ref 12–28)
CALCIUM SERPL-MCNC: 9.6 MG/DL (ref 8.7–10.3)
CALCIUM SERPL-MCNC: 9.8 MG/DL (ref 8.7–10.3)
CHLORIDE SERPL-SCNC: 98 MMOL/L (ref 96–106)
CHLORIDE SERPL-SCNC: 99 MMOL/L (ref 96–106)
CHOLEST SERPL-MCNC: 171 MG/DL (ref 100–199)
CO2 SERPL-SCNC: 23 MMOL/L (ref 20–29)
CO2 SERPL-SCNC: 24 MMOL/L (ref 20–29)
CREAT SERPL-MCNC: 0.59 MG/DL (ref 0.57–1)
CREAT SERPL-MCNC: 0.63 MG/DL (ref 0.57–1)
GLOBULIN SER CALC-MCNC: 2.3 G/DL (ref 1.5–4.5)
GLOBULIN SER CALC-MCNC: 2.3 G/DL (ref 1.5–4.5)
GLUCOSE SERPL-MCNC: 94 MG/DL (ref 65–99)
GLUCOSE SERPL-MCNC: 95 MG/DL (ref 65–99)
HDLC SERPL-MCNC: 57 MG/DL
LABORATORY COMMENT REPORT: NORMAL
LDLC SERPL CALC-MCNC: 85 MG/DL (ref 0–99)
POTASSIUM SERPL-SCNC: 3.7 MMOL/L (ref 3.5–5.2)
POTASSIUM SERPL-SCNC: 4 MMOL/L (ref 3.5–5.2)
PROT SERPL-MCNC: 6.6 G/DL (ref 6–8.5)
PROT SERPL-MCNC: 6.7 G/DL (ref 6–8.5)
SODIUM SERPL-SCNC: 139 MMOL/L (ref 134–144)
SODIUM SERPL-SCNC: 140 MMOL/L (ref 134–144)
TRIGL SERPL-MCNC: 147 MG/DL (ref 0–149)
VLDLC SERPL CALC-MCNC: 29 MG/DL (ref 5–40)

## 2019-09-17 DIAGNOSIS — E78.5 HYPERLIPIDEMIA LDL GOAL <70: ICD-10-CM

## 2019-09-17 DIAGNOSIS — I10 HYPERTENSION, ESSENTIAL: ICD-10-CM

## 2019-09-17 DIAGNOSIS — R00.2 HEART PALPITATIONS: ICD-10-CM

## 2019-09-17 DIAGNOSIS — F43.9 STRESS: ICD-10-CM

## 2019-09-17 DIAGNOSIS — E87.6 HYPOKALEMIA: ICD-10-CM

## 2019-09-17 RX ORDER — VALSARTAN 80 MG/1
TABLET ORAL
Qty: 90 TAB | Refills: 0 | Status: SHIPPED | OUTPATIENT
Start: 2019-09-17 | End: 2020-10-26

## 2019-09-17 RX ORDER — COLESEVELAM 180 1/1
1250 TABLET ORAL 2 TIMES DAILY WITH MEALS
Qty: 360 TAB | Refills: 0 | Status: SHIPPED | OUTPATIENT
Start: 2019-09-17 | End: 2019-09-18 | Stop reason: SDUPTHER

## 2019-09-17 RX ORDER — SERTRALINE HYDROCHLORIDE 100 MG/1
TABLET, FILM COATED ORAL
Qty: 90 TAB | Refills: 0 | Status: SHIPPED | OUTPATIENT
Start: 2019-09-17 | End: 2020-09-01

## 2019-09-17 RX ORDER — PITAVASTATIN CALCIUM 1.04 MG/1
1 TABLET, FILM COATED ORAL
Qty: 30 TAB | Refills: 0 | Status: SHIPPED | OUTPATIENT
Start: 2019-09-17 | End: 2019-09-18 | Stop reason: SDUPTHER

## 2019-09-18 ENCOUNTER — OFFICE VISIT (OUTPATIENT)
Dept: VASCULAR LAB | Facility: MEDICAL CENTER | Age: 64
End: 2019-09-18
Attending: INTERNAL MEDICINE
Payer: COMMERCIAL

## 2019-09-18 VITALS
HEART RATE: 56 BPM | SYSTOLIC BLOOD PRESSURE: 120 MMHG | HEIGHT: 63 IN | WEIGHT: 203 LBS | BODY MASS INDEX: 35.97 KG/M2 | DIASTOLIC BLOOD PRESSURE: 52 MMHG

## 2019-09-18 DIAGNOSIS — I48.0 PAROXYSMAL ATRIAL FIBRILLATION (HCC): ICD-10-CM

## 2019-09-18 DIAGNOSIS — I10 ESSENTIAL HYPERTENSION: ICD-10-CM

## 2019-09-18 DIAGNOSIS — E78.5 DYSLIPIDEMIA: ICD-10-CM

## 2019-09-18 DIAGNOSIS — R73.01 IFG (IMPAIRED FASTING GLUCOSE): ICD-10-CM

## 2019-09-18 DIAGNOSIS — I83.92 VARICOSE VEINS OF LEFT LOWER EXTREMITY, UNSPECIFIED WHETHER COMPLICATED: ICD-10-CM

## 2019-09-18 DIAGNOSIS — E78.5 HYPERLIPIDEMIA LDL GOAL <70: ICD-10-CM

## 2019-09-18 PROCEDURE — 99214 OFFICE O/P EST MOD 30 MIN: CPT | Performed by: NURSE PRACTITIONER

## 2019-09-18 PROCEDURE — 99212 OFFICE O/P EST SF 10 MIN: CPT | Performed by: NURSE PRACTITIONER

## 2019-09-18 RX ORDER — COLESEVELAM 180 1/1
1250 TABLET ORAL 2 TIMES DAILY WITH MEALS
Qty: 360 TAB | Refills: 6 | Status: SHIPPED | OUTPATIENT
Start: 2019-09-18 | End: 2020-11-05

## 2019-09-18 RX ORDER — PITAVASTATIN CALCIUM 1.04 MG/1
1 TABLET, FILM COATED ORAL
Qty: 90 TAB | Refills: 3 | Status: SHIPPED | OUTPATIENT
Start: 2019-09-18 | End: 2020-02-12

## 2019-09-18 ASSESSMENT — ENCOUNTER SYMPTOMS
DOUBLE VISION: 0
PALPITATIONS: 0
CHILLS: 0
SEIZURES: 0
BLOOD IN STOOL: 0
INSOMNIA: 0
NAUSEA: 0
WEAKNESS: 0
ORTHOPNEA: 0
FEVER: 0
DIZZINESS: 0
COUGH: 0
HEMOPTYSIS: 0
VOMITING: 0
MYALGIAS: 0
SORE THROAT: 0
ABDOMINAL PAIN: 0
DEPRESSION: 0
HEADACHES: 0
DIARRHEA: 0
TREMORS: 0
NERVOUS/ANXIOUS: 0
BLURRED VISION: 0
SHORTNESS OF BREATH: 0
WHEEZING: 0
FOCAL WEAKNESS: 0
BRUISES/BLEEDS EASILY: 0

## 2019-09-18 NOTE — PROGRESS NOTES
FOLLOW-UP VASCULAR MED VISIT  Subjective:   Sona Thakur is a 63 y.o. female who presents today 18 for   Chief Complaint   Patient presents with   • Follow-Up   Initially referred by PCP (BETITO Dejesus) for eval of varicose veins of the legs in context of Afib.     HPI:    BLE varicosities:   Mild aching.  Not using compression stockings regularly.     Afib:  Dx , no current anticoag, Using ASA 81mg per cardiology.  No focal CVA or TIA signs.      HTN:  Current HTN concerns: Denies  ADRs: no   HTN sx:  No current blurred or changed vision, chest pain, shortness of breath, headache, nausea, dizziness/vertigo   Home BP lo/70s  24h ABPM completed: No  Adherence to current HTN meds: compliant all of the time     Antiplatelet/anticoagulation: Yes, Details: tolerating 81mg, no bleeding    Hyperlipidemia:  Stable, no current concerns  Current treatment: Livalo 1mg QOD, welchol BID  Myalgias? mild since taking livalo but tolerable , started contemporanous to Livalo start.  Denies overt soreness or tenderness to touch, no skin changes  Other adverse drug reactions? No    Pertinent HTN hx (reviewed at initial visit):   Age at HTN dx: >5 years   Past HTN medications: only as noted   HTN crises:  No prior hospitalization or crises   Lifestyle factors:     High salt: No   EtOH: No  Interfering substances:     NSAIDs: No    Decongestants. No   Other ASCVD risk factors:     DM: prediabetes identified in the past    CKD:  No   CLAUDE: No   Hypothyroidism: No    Social History     Tobacco Use   Smoking Status Former Smoker   • Packs/day: 0.50   • Years: 5.00   • Pack years: 2.50   • Types: Cigars   • Last attempt to quit: 2015   • Years since quittin.6   Smokeless Tobacco Never Used     Social History     Tobacco Use   • Smoking status: Former Smoker     Packs/day: 0.50     Years: 5.00     Pack years: 2.50     Types: Cigars     Last attempt to quit: 2015     Years since quittin.6   • Smokeless  tobacco: Never Used   Substance Use Topics   • Alcohol use: No     Alcohol/week: 0.0 oz   • Drug use: No     Outpatient Encounter Medications as of 9/18/2019   Medication Sig Dispense Refill   • colesevelam (WELCHOL) 625 MG Tab Take 2 Tabs by mouth 2 times a day, with meals for 360 days. 360 Tab 6   • Pitavastatin Calcium (LIVALO) 1 MG Tab Take 1 mg by mouth every 48 hours. 90 Tab 3   • sertraline (ZOLOFT) 100 MG Tab TAKE 1 TABLET BY MOUTH ONCE DAILY 90 Tab 0   • valsartan (DIOVAN) 80 MG Tab TAKE 1 TABLET BY MOUTH ONCE DAILY 90 Tab 0   • potassium chloride ER (KLOR-CON) 10 MEQ tablet Take 10 mEq by mouth.  3   • latanoprost (XALATAN) 0.005 % Solution      • timolol gel-forming (TIMOPTIC-XR) 0.5 % GEL FORMING SOLUTION      • aspirin EC (ECOTRIN) 81 MG Tablet Delayed Response Take 162 mg by mouth every day.     • chlorthalidone (HYGROTON) 25 MG Tab TAKE ONE TABLET BY MOUTH ONCE DAILY 90 Tab 3   • acetaminophen/caffeine/butalbital 325-40-50 mg (FIORICET) -40 MG Tab Take 1 Tab by mouth every four hours as needed for Headache. 30 Tab 1   • ascorbic acid (ASCORBIC ACID) 500 MG Tab Take 500 mg by mouth every day.     • calcium-vitamin D (CALCIUM 500 + D) 500-125 MG-UNIT Tab Take 1 Tab by mouth every day.     • Omega-3 Fatty Acids (FISH OIL) 1000 MG Cap capsule Take 1 Cap by mouth 3 times a day, with meals. 90 Cap 0   • Cholecalciferol (VITAMIN D) 2000 UNITS Cap Take 1 Cap by mouth every day. (Patient taking differently: Take 2,000 Units by mouth 2 Times a Day.) 30 Cap 0   • acetaminophen (TYLENOL) 325 MG Tab Take 2 Tabs by mouth every four hours as needed. 30 Tab 0   • timolol (TIMOPTIC) 0.5 % SOLN Place 1 Drop in both eyes 2 times a day.     • travoprost (TRAVOPROST) 0.004 % SOLN Place 1 Drop in left eye every evening.     • [DISCONTINUED] colesevelam (WELCHOL) 625 MG Tab Take 2 Tabs by mouth 2 times a day, with meals for 360 days. 360 Tab 0   • [DISCONTINUED] Pitavastatin Calcium (LIVALO) 1 MG Tab Take 1 mg by  "mouth every 48 hours. 30 Tab 0   • omeprazole (PRILOSEC) 20 MG delayed-release capsule Take 1 cap am and 2 caps pm (Patient not taking: Reported on 9/18/2019) 90 Cap 0   • potassium chloride SA (K-DUR) 10 MEQ Tab CR TAKE 1 TABLET BY MOUTH ONCE DAILY (Patient not taking: Reported on 9/9/2019) 90 Tab 3   • Pitavastatin Calcium 1 MG Tab Take 1 mg by mouth every evening for 360 days. (Patient not taking: Reported on 9/9/2019) 90 Tab 3     No facility-administered encounter medications on file as of 9/18/2019.      Allergies   Allergen Reactions   • Allegra      \"Gave me a bad headache   • Lipitor [Atorvastatin Calcium]      \"Hurts my bones\"   • Pravastatin      Back, legs and hip pain   • Voltaren [Diclofenac]      DIET AND EXERCISE:  Weight Change: none   BMI Readings from Last 4 Encounters:   09/18/19 35.96 kg/m²   09/09/19 35.96 kg/m²   03/25/19 35.96 kg/m²   03/11/19 36.15 kg/m²      Diet: low fat  Exercise: minimal exercise     Review of Systems   Constitutional: Negative for chills, fever and malaise/fatigue.   HENT: Negative for nosebleeds, sore throat and tinnitus.    Eyes: Negative for blurred vision and double vision.   Respiratory: Negative for cough, hemoptysis, shortness of breath and wheezing.    Cardiovascular: Positive for leg swelling (mostly after extended standing). Negative for chest pain, palpitations and orthopnea.   Gastrointestinal: Negative for abdominal pain, blood in stool, diarrhea, melena, nausea and vomiting.   Genitourinary: Negative for hematuria.   Musculoskeletal: Negative for joint pain and myalgias.   Skin: Negative for itching and rash.   Neurological: Negative for dizziness, tremors, focal weakness, seizures, weakness and headaches.   Endo/Heme/Allergies: Does not bruise/bleed easily.   Psychiatric/Behavioral: Negative for depression. The patient is not nervous/anxious and does not have insomnia.       Objective:     Vitals:    09/18/19 0824   BP: 120/52   BP Location: Left arm " "  Patient Position: Sitting   BP Cuff Size: Large adult   Pulse: (!) 56   Weight: 92.1 kg (203 lb)   Height: 1.6 m (5' 3\")      BP Readings from Last 4 Encounters:   09/18/19 120/52   09/09/19 110/72   03/25/19 120/68   03/11/19 125/77      Body mass index is 35.96 kg/m².     Physical Exam   Constitutional: She is oriented to person, place, and time. She appears well-developed and well-nourished. No distress.   HENT:   Head: Normocephalic and atraumatic.   Cardiovascular: Normal rate, regular rhythm, normal heart sounds and intact distal pulses. Exam reveals no gallop and no friction rub.   No murmur heard.  Pulses:       Carotid pulses are 2+ on the right side, and 2+ on the left side.       Radial pulses are 2+ on the right side, and 2+ on the left side.        Dorsalis pedis pulses are 2+ on the right side, and 2+ on the left side.        Posterior tibial pulses are 2+ on the right side, and 2+ on the left side.   Edema     RLE: none     LLE: none   Spider telangectasia:       RLE:  None      LLE: none   Varicosities:         RLE: none      LLE: none   Corona phlebectatica:      RLE:  None       LLE:  None   Cording:         RLE:  None     LLE: None      Pulmonary/Chest: Effort normal and breath sounds normal.   Musculoskeletal: Normal range of motion. She exhibits no edema or tenderness.   Neurological: She is alert and oriented to person, place, and time. No cranial nerve deficit. She exhibits normal muscle tone. Coordination normal.   Skin: Skin is warm and dry. She is not diaphoretic.   Psychiatric: She has a normal mood and affect.     Lab Results   Component Value Date    CHOLSTRLTOT 171 09/11/2019    LDL 85 09/11/2019    HDL 57 09/11/2019    TRIGLYCERIDE 147 09/11/2019         Lab Results   Component Value Date    HBA1C 5.6 03/18/2019      Lab Results   Component Value Date    SODIUM 139 09/11/2019    POTASSIUM 3.7 09/11/2019    CHLORIDE 98 09/11/2019    CO2 23 09/11/2019    GLUCOSE 94 09/11/2019    BUN 11 " 09/11/2019    CREATININE 0.59 09/11/2019    BUNCREATRAT 19 09/11/2019    IFAFRICA 112 09/11/2019    IFNOTAFR 97 09/11/2019        Results for GERI MARCIAL (MRN [id number]) as of 12/13/2018 08:31   Ref. Range 11/13/2018 10:20   D-Dimer Screen Latest Ref Range: 0.00 - 0.49 mg/L FEU 0.22     VASCULAR IMAGING:     BLE reflux duplex 11/9/18  Right:   A branch of the greater saphenous vein at the mid-distal thigh demonstrates    reflux for a maximum time exceeding 9.5 seconds.   At the proximal posterior thigh, a  connecting the femoral vein    to the superficial venous system demonstrates reflux for a maximum time of    3.8 seconds.   No evidence of deep venous reflux.   No evidence of deep or superficial venous thrombosis.     Left:   Trace deep venous reflux is demonstrated in the common femoral vein at the    saphenofemoral junction for a maximum time of 2.4 seconds.   Trace superficial venous reflux is demonstrated in the greater saphenous    vein at the saphenofemoral junction for a maximum time of 1.1 seconds.   No evidence of deep or superficial venous thrombosis.    Carotid Duplex 3/19/20      CONCLUSIONS   Right carotid.    Flow velocities and Doppler waveforms are normal throughout the carotid    system without evidence of plaque.      Left carotid.    Very mild plaque of the carotid bifurcation. Doppler velocities are normal.  Medical Decision Making:  Today's Assessment / Status / Plan:     1. Essential hypertension     2. Paroxysmal atrial fibrillation (HCC)     3. Dyslipidemia  Comp Metabolic Panel    Lipid Profile   4. Varicose veins of left lower extremity, unspecified whether complicated     5. IFG (impaired fasting glucose)     6. Hyperlipidemia LDL goal <70  colesevelam (WELCHOL) 625 MG Tab    Pitavastatin Calcium (LIVALO) 1 MG Tab    on livalo every toher day with some cramping.  also single elevated lft.  will recheck in 2 weeks.  f/u 6 wks for lab review and bp check.  f/u w/cardiac as  [facility]     Patient Type: Primary Prevention    Etiology of Established CVD if Present: hx of Afib     Lipid Management: Qualifies for Statin Therapy Based on 2013 ACC/AHA Guidelines: yes  Calculated 10-Year Risk of ASCVD: N/A  Currently on Statin: yes  Failed atorvastatin and pravastatin due to myalgias.    Taking cholestyramine led to gas distension and bloating.    Using vit D 3000 units.  No prior use of zetia or pitavastatin or niacin.   hsCRP = 7.68, indicating higher risk, and could intensify statin tx   Has associated hypertrig   NLA Risk Category:   High:  3+ major RFs  Tx goal:  non-HDL <130, LDL-C <100 (optional: apoB<90)   At goal:  yes  Tx plan:   - Continue pitavastatin 1mg to daily dosing and monitor myalgias closely-- let us know if need samples due to cost   - Continue welchol 1250mg BID, has PAR through 12/2019   - Consider addition of zetia if not at goal next visit   - Continue Cholest-off  - Repeat CMP, lipids in about 6mo     Blood Pressure Management:Goal: ACC/AHA (2017) goal <130/80  Home BP at goal:  yes  Office BP at goal:  yes  Plan:   - Continue home BP monitoring, reviewed correct technique:  Yes   - Order 24h ABPM:  NO  - Continue chlorthalidone 25mg daily   - Continue losartan 50mg daily, increase to 100mg if BP not controlled     Glycemic Status: Prediabetic   Fasting glucose stable on recent labs  - Continue healthy diet, weight reduction, daily physical activity   - Consider metformin up to 850mg BID to slow or offset progression to T2D as per DPP trial   - Monitor labs Q6-12mo    Anti-Platelet/Anti-Coagulant Tx: yes.  Hx of Afib that is rate-controlled.  SIS-VASC score = 2 pt.  2 points  Stroke risk was 2.2% per year in >90,000 patients (the [place] Atrial Fibrillation Cohort Study) and 2.9% risk of stroke/TIA/systemic embolism.  Meets criteria for full anticoagulation but has had major GIB and is not willing to restart DOAC or VKA.  She is aware of elevated CVA risk w/o  anticoagulation despite use of full-strength anticoag.    Plan:  - Continue ASA 81mg per cardiology recs  - Defer Afib and decisions for anticoag in the future to cardiology     Smoking: continued complete avoidance of all tobacco products      Physical Activity: continue healthy activity to improve CV fitness, see care instructions     Weight Management and Nutrition: Dietary plan was discussed with patient at this visit including DASH and AHA diets, low sodium as outlined in care instructions     Other:   1) BLE varicosities, mild symptomatic, right > left superficial venous reflux disease w/o DVT   - Use compression stockings as much as possible.  - During air travel, walk hourly and complete isometric leg stretches  - Ok to increase ASA to 162mg for a few days before air travel  - Ongoing care with [facility]     2) Afib - as noted above, defer to cardiology and discuss anticoag    - continue ASA for now     3) obesity - as noted above     4) Vit D deficiency - resolved 2/11/19,   - Continue Vit D3 4000 units daily to offset statin myalgias potenially    5) hypertrig, mild  - recommend kcal reduction, reduced fried/fatty foods, avoid etoh  - monitor routinely, see care instructions for diet info    6) increasing frequency of unilateral HA, mild dizz. Does not appear to be related to hypotension.  No evidence of plaque in R carotid; very mild plaque on L carotid.    - Recommend prompt attention for CVA s/s  - Defer additional w/u to PCP for ongoing care     Instructed to follow-up with PCP for remainder of adult medical needs: yes  We will partner with other providers in the management of established vascular disease and cardiometabolic risk factors.    Studies to Be Obtained: None   Labs to Be Obtained: CMP, lipid in 6mo    Follow up in: 6mo     DOMINIQUE Cueva.

## 2019-10-17 DIAGNOSIS — G43.909 MIGRAINE SYNDROME: ICD-10-CM

## 2019-10-17 RX ORDER — BUTALBITAL, ACETAMINOPHEN AND CAFFEINE 50; 325; 40 MG/1; MG/1; MG/1
TABLET ORAL
Qty: 30 TAB | Refills: 1 | Status: SHIPPED | OUTPATIENT
Start: 2019-10-17 | End: 2020-01-15

## 2019-10-17 RX ORDER — BUTALBITAL, ACETAMINOPHEN AND CAFFEINE 50; 325; 40 MG/1; MG/1; MG/1
1 TABLET ORAL EVERY 4 HOURS PRN
Qty: 30 TAB | Refills: 1 | Status: SHIPPED
Start: 2019-10-17 | End: 2020-01-15

## 2019-11-25 ENCOUNTER — APPOINTMENT (RX ONLY)
Dept: URBAN - METROPOLITAN AREA CLINIC 31 | Facility: CLINIC | Age: 64
Setting detail: DERMATOLOGY
End: 2019-11-25

## 2019-11-25 DIAGNOSIS — L82.1 OTHER SEBORRHEIC KERATOSIS: ICD-10-CM

## 2019-11-25 DIAGNOSIS — D18.0 HEMANGIOMA: ICD-10-CM

## 2019-11-25 DIAGNOSIS — L85.3 XEROSIS CUTIS: ICD-10-CM

## 2019-11-25 DIAGNOSIS — L57.8 OTHER SKIN CHANGES DUE TO CHRONIC EXPOSURE TO NONIONIZING RADIATION: ICD-10-CM

## 2019-11-25 DIAGNOSIS — L57.0 ACTINIC KERATOSIS: ICD-10-CM

## 2019-11-25 DIAGNOSIS — D22 MELANOCYTIC NEVI: ICD-10-CM

## 2019-11-25 PROBLEM — D22.62 MELANOCYTIC NEVI OF LEFT UPPER LIMB, INCLUDING SHOULDER: Status: ACTIVE | Noted: 2019-11-25

## 2019-11-25 PROBLEM — D18.01 HEMANGIOMA OF SKIN AND SUBCUTANEOUS TISSUE: Status: ACTIVE | Noted: 2019-11-25

## 2019-11-25 PROBLEM — D22.39 MELANOCYTIC NEVI OF OTHER PARTS OF FACE: Status: ACTIVE | Noted: 2019-11-25

## 2019-11-25 PROBLEM — D22.5 MELANOCYTIC NEVI OF TRUNK: Status: ACTIVE | Noted: 2019-11-25

## 2019-11-25 PROBLEM — D22.72 MELANOCYTIC NEVI OF LEFT LOWER LIMB, INCLUDING HIP: Status: ACTIVE | Noted: 2019-11-25

## 2019-11-25 PROBLEM — D48.5 NEOPLASM OF UNCERTAIN BEHAVIOR OF SKIN: Status: ACTIVE | Noted: 2019-11-25

## 2019-11-25 PROBLEM — D23.71 OTHER BENIGN NEOPLASM OF SKIN OF RIGHT LOWER LIMB, INCLUDING HIP: Status: ACTIVE | Noted: 2019-11-25

## 2019-11-25 PROBLEM — D22.71 MELANOCYTIC NEVI OF RIGHT LOWER LIMB, INCLUDING HIP: Status: ACTIVE | Noted: 2019-11-25

## 2019-11-25 PROBLEM — D22.61 MELANOCYTIC NEVI OF RIGHT UPPER LIMB, INCLUDING SHOULDER: Status: ACTIVE | Noted: 2019-11-25

## 2019-11-25 PROBLEM — D22.22 MELANOCYTIC NEVI OF LEFT EAR AND EXTERNAL AURICULAR CANAL: Status: ACTIVE | Noted: 2019-11-25

## 2019-11-25 PROCEDURE — ? LIQUID NITROGEN

## 2019-11-25 PROCEDURE — ? COUNSELING

## 2019-11-25 PROCEDURE — 17003 DESTRUCT PREMALG LES 2-14: CPT

## 2019-11-25 PROCEDURE — 99396 PREV VISIT EST AGE 40-64: CPT | Mod: 25

## 2019-11-25 PROCEDURE — 11102 TANGNTL BX SKIN SINGLE LES: CPT

## 2019-11-25 PROCEDURE — 17000 DESTRUCT PREMALG LESION: CPT | Mod: 59

## 2019-11-25 PROCEDURE — ? BIOPSY BY SHAVE METHOD

## 2019-11-25 ASSESSMENT — LOCATION DETAILED DESCRIPTION DERM
LOCATION DETAILED: LEFT SUPERIOR MEDIAL MIDBACK
LOCATION DETAILED: LEFT INFERIOR LATERAL MIDBACK
LOCATION DETAILED: LEFT ANTERIOR SHOULDER
LOCATION DETAILED: LEFT PROXIMAL DORSAL FOREARM
LOCATION DETAILED: LEFT LATERAL SUPERIOR CHEST
LOCATION DETAILED: LEFT LATERAL ABDOMEN
LOCATION DETAILED: RIGHT RIB CAGE
LOCATION DETAILED: RIGHT LATERAL MALAR CHEEK
LOCATION DETAILED: RIGHT DISTAL DORSAL FOREARM
LOCATION DETAILED: LEFT POSTERIOR SHOULDER
LOCATION DETAILED: LEFT INFRAMAMMARY CREASE (INNER QUADRANT)
LOCATION DETAILED: RIGHT PROXIMAL PRETIBIAL REGION
LOCATION DETAILED: RIGHT ANTERIOR PROXIMAL UPPER ARM
LOCATION DETAILED: LEFT FOREHEAD
LOCATION DETAILED: LEFT ULNAR DORSAL HAND
LOCATION DETAILED: LEFT ANTERIOR DISTAL THIGH
LOCATION DETAILED: RIGHT RADIAL DORSAL HAND
LOCATION DETAILED: LEFT VENTRAL PROXIMAL FOREARM
LOCATION DETAILED: RIGHT ANTERIOR PROXIMAL THIGH
LOCATION DETAILED: LEFT PROXIMAL POSTERIOR UPPER ARM
LOCATION DETAILED: RIGHT ULNAR DORSAL HAND
LOCATION DETAILED: LEFT INFERIOR CRUS OF ANTIHELIX
LOCATION DETAILED: LEFT RIB CAGE
LOCATION DETAILED: RIGHT ANTERIOR DISTAL UPPER ARM
LOCATION DETAILED: RIGHT ANTERIOR SHOULDER
LOCATION DETAILED: LEFT DISTAL POSTERIOR THIGH
LOCATION DETAILED: RIGHT ANTERIOR DISTAL THIGH
LOCATION DETAILED: RIGHT SUPERIOR MEDIAL UPPER BACK
LOCATION DETAILED: PERIUMBILICAL SKIN
LOCATION DETAILED: RIGHT MID-UPPER BACK
LOCATION DETAILED: LEFT DISTAL POSTERIOR UPPER ARM
LOCATION DETAILED: RIGHT DISTAL POSTERIOR THIGH
LOCATION DETAILED: LEFT SUPERIOR ANTERIOR NECK
LOCATION DETAILED: NASAL DORSUM
LOCATION DETAILED: LEFT SUPERIOR MEDIAL LOWER BACK
LOCATION DETAILED: LEFT INFERIOR UPPER BACK
LOCATION DETAILED: LEFT ANTERIOR PROXIMAL UPPER ARM
LOCATION DETAILED: LEFT PROXIMAL CALF
LOCATION DETAILED: RIGHT LATERAL ABDOMEN
LOCATION DETAILED: RIGHT MEDIAL INFERIOR CHEST
LOCATION DETAILED: RIGHT SUPERIOR UPPER BACK
LOCATION DETAILED: RIGHT POSTERIOR SHOULDER
LOCATION DETAILED: STERNAL NOTCH
LOCATION DETAILED: LEFT ANTERIOR PROXIMAL THIGH
LOCATION DETAILED: RIGHT VENTRAL PROXIMAL FOREARM
LOCATION DETAILED: RIGHT INFERIOR CENTRAL MALAR CHEEK
LOCATION DETAILED: SUPERIOR THORACIC SPINE
LOCATION DETAILED: RIGHT PROXIMAL CALF
LOCATION DETAILED: SUBXIPHOID
LOCATION DETAILED: RIGHT MEDIAL BREAST 4-5:00 REGION
LOCATION DETAILED: RIGHT PROXIMAL DORSAL FOREARM
LOCATION DETAILED: LEFT MEDIAL UPPER BACK

## 2019-11-25 ASSESSMENT — LOCATION ZONE DERM
LOCATION ZONE: FACE
LOCATION ZONE: HAND
LOCATION ZONE: EAR
LOCATION ZONE: NECK
LOCATION ZONE: TRUNK
LOCATION ZONE: LEG
LOCATION ZONE: NOSE
LOCATION ZONE: ARM

## 2019-11-25 ASSESSMENT — LOCATION SIMPLE DESCRIPTION DERM
LOCATION SIMPLE: RIGHT BREAST
LOCATION SIMPLE: UPPER BACK
LOCATION SIMPLE: ABDOMEN
LOCATION SIMPLE: CHEST
LOCATION SIMPLE: LEFT BREAST
LOCATION SIMPLE: RIGHT UPPER BACK
LOCATION SIMPLE: LEFT ANTERIOR NECK
LOCATION SIMPLE: NOSE
LOCATION SIMPLE: LEFT CALF
LOCATION SIMPLE: RIGHT SHOULDER
LOCATION SIMPLE: RIGHT CALF
LOCATION SIMPLE: RIGHT FOREARM
LOCATION SIMPLE: RIGHT POSTERIOR THIGH
LOCATION SIMPLE: RIGHT PRETIBIAL REGION
LOCATION SIMPLE: LEFT HAND
LOCATION SIMPLE: LEFT FOREARM
LOCATION SIMPLE: LEFT UPPER ARM
LOCATION SIMPLE: LEFT UPPER BACK
LOCATION SIMPLE: LEFT POSTERIOR UPPER ARM
LOCATION SIMPLE: RIGHT HAND
LOCATION SIMPLE: LEFT FOREHEAD
LOCATION SIMPLE: RIGHT THIGH
LOCATION SIMPLE: LEFT EAR
LOCATION SIMPLE: LEFT POSTERIOR THIGH
LOCATION SIMPLE: LEFT LOWER BACK
LOCATION SIMPLE: RIGHT CHEEK
LOCATION SIMPLE: LEFT THIGH
LOCATION SIMPLE: LEFT SHOULDER
LOCATION SIMPLE: RIGHT UPPER ARM

## 2019-11-25 NOTE — PROCEDURE: BIOPSY BY SHAVE METHOD
Size Of Lesion In Cm: 0.4
Consent: Written consent was obtained and risks were reviewed including but not limited to scarring, infection, bleeding, scabbing, incomplete removal, nerve damage and allergy to anesthesia.
Notification Instructions: Patient will be notified of biopsy results. However, patient instructed to call the office if not contacted within 2 weeks.
Electrodesiccation Text: The wound bed was treated with electrodesiccation after the biopsy was performed.
Bill For Surgical Tray: no
Biopsy Type: H and E
X Size Of Lesion In Cm: 0
Type Of Destruction Used: Curettage
Depth Of Biopsy: dermis
Dressing: bandage
Electrodesiccation And Curettage Text: The wound bed was treated with electrodesiccation and curettage after the biopsy was performed.
Lab: [id number]
Curettage Text: The wound bed was treated with curettage after the biopsy was performed.
Hemostasis: Aluminum Chloride and Electrocautery
Billing Type: Third-Party Bill
Silver Nitrate Text: The wound bed was treated with silver nitrate after the biopsy was performed.
Wound Care: Petrolatum
Cryotherapy Text: The wound bed was treated with cryotherapy after the biopsy was performed.
Lab Facility: 
Was A Bandage Applied: Yes
Anesthesia Type: 1% lidocaine with epinephrine
Detail Level: Detailed
Post-Care Instructions: I reviewed with the patient in detail post-care instructions. Patient is to keep the biopsy site bandaged overnight, and then wash once daily with soap and water and then apply a thin layer of petrolatum once daily until healed.

## 2019-12-02 NOTE — PROGRESS NOTES
INITIAL VASCULAR MED VISIT  Subjective:   Sona Thakur is a 62 y.o. female who presents today 10/29/2018 for   Chief Complaint   Patient presents with   • New Patient     Vericose vein of leg   Initially referred by PCP (BETITO Dejesus) for eval of varicose veins of the legs in context of Afib.     HPI:    BLE varicosities:  Hx of Afib and concerned about DVT, gets intermittent posterior RLE above knee about 1 month and LLE lateral calf earlier in the year.   Pending evaluation with next month with [facility] for evaluation.      Afib:  Dx , had palpitations and heart pauses.  Seen in ED for r/o MI.  Had been on xarelto with significant hemorrhoidal and laceration bleeding.  Hx of ulcers in the family.  Denies use of VKA.       Current HTN concerns: Denies  HTN sx:  No current blurred or changed vision, chest pain, shortness of breath, headache, nausea, dizziness/vertigo   Home BP lo/70s  24h ABPM completed: No  Adherence to current HTN meds: compliant all of the time   Other ASCVD risk factors:     DM: prediabetes identified in the past    CKD:  No   CLAUDE: No   Hypothyroidism: No    Antiplatelet/anticoagulation: Yes, Details: tolerating 325mg, no bleeding    Hyperlipidemia: Yes, Details: taking zetia 10mg daily, recent labs completed, no current statin .  Taking cholestyramine w/o major GI issues     Pertinent HTN hx:   Age at HTN dx: >5 years   Past HTN medications: only as noted   HTN crises:  No prior hospitalization or crises   Lifestyle factors:     High salt: No   EtOH: No  Interfering substances:     NSAIDs: No    Decongestants. No    Past Medical History:   Diagnosis Date   • Allergy    • Anesthesia     nausea and vomitting   • Arthritis     osteoarthritis, right knee, left hand   • Atrial fibrillation (HCC) 2/15/2013   • GERD (gastroesophageal reflux disease)    • Glaucoma     left eye   • Heart burn    • High cholesterol    • Hypertension    • Unspecified cataract     early stages     Past  Refused Prescriptions:                       Disp   Refills    fluticasone (FLONASE) 50 MCG/ACT nasal spr*16 mL  0        Sig: INSTILL 2 SPRAYS INTO BOTH NOSTRILS DAILY  Refused By: DUYEN ROSE  Reason for Refusal: Request already responded to by other means (phone, fax, etc.)  Reason for Refusal Comment: refilled 11-7-2019    Duyen  [phone] (home)      Surgical History:   Procedure Laterality Date   • BROW LIFT Bilateral 10/11/2017    Procedure: BROW LIFT - FOR DIRECT BROWPLASTY;  Surgeon: Nitish Martínez M.D.;  Location: SURGERY SAME DAY [facility];  Service: Ophthalmology   • BLEPHAROPLASTY Bilateral 10/11/2017    Procedure: BLEPHAROPLASTY - UPPER LID;  Surgeon: Nitish Martínez M.D.;  Location: SURGERY SAME DAY [facility];  Service: Ophthalmology   • CATARACT PHACO WITH IOL Right 2/14/2017    Procedure: CATARACT PHACO WITH IOL;  Surgeon: Yuan Sousa M.D.;  Location: SURGERY SURGICAL [facility];  Service:    • CATARACT PHACO WITH IOL Left 1/24/2017    Procedure: CATARACT PHACO WITH IOL;  Surgeon: Yuan Sousa M.D.;  Location: SURGERY SURGICAL [facility];  Service:    • RECOVERY  5/18/2015    Procedure: A-FIB ABLATION CARTO LRG GRP ICD: 427.31 ZAMARRIPA;  Surgeon: Recoveryonly Surgery;  Location: SURGERY PRE-POST PROC UNIT [facility];  Service:    • RECOVERY  7/30/2014    Performed by Cath-Recovery Surgery at SURGERY SAME DAY [facility]   • OTHER CARDIAC SURGERY  2014    ablation   • ABDOMINAL HYSTERECTOMY TOTAL  1994    FAUSTINO/BSO for prolapse   • TONSILLECTOMY AND ADENOIDECTOMY  1979     Family History   Problem Relation Age of Onset   • Hypertension Mother    • Cancer Father    • Hypertension Sister    • Heart Disease Maternal Grandmother      History   Smoking Status   • Former Smoker   • Packs/day: 0.50   • Years: 5.00   • Types: Cigars   • Quit date: 1/28/2015   Smokeless Tobacco   • Never Used     Social History   Substance Use Topics   • Smoking status: Former Smoker     Packs/day: 0.50     Years: 5.00     Types: Cigars     Quit date: 1/28/2015   • Smokeless tobacco: Never Used   • Alcohol use No     Outpatient Encounter Prescriptions as of 10/29/2018   Medication Sig Dispense Refill   • ezetimibe (ZETIA) 10 MG Tab Take 1 Tab by mouth every day for 360 days. 90 Tab 3   • chlorthalidone (HYGROTON) 25 MG Tab TAKE ONE TABLET BY MOUTH ONCE DAILY 90 Tab 3   •  "losartan (COZAAR) 50 MG Tab TAKE ONE TABLET BY MOUTH ONCE DAILY 90 Tab 3   • omeprazole (PRILOSEC) 20 MG delayed-release capsule TAKE 1 CAPSULE BY MOUTH ONCE DAILY IN THE MORNING AND 2CAPSULES ONCE DAILY IN THE EVENING 90 Cap 3   • sertraline (ZOLOFT) 100 MG Tab TAKE ONE TABLET BY MOUTH ONCE DAILY 90 Tab 3   • cholestyramine (QUESTRAN) 4 g packet DISSOLVE & MIX POWDER IN LIQUID AND TAKE BY MOUTH TWICE DAILY 180 Each 3   • potassium chloride SA (KLOR-CON M10) 10 MEQ Tab CR Take 1 Tab by mouth every day. 90 Tab 3   • aspirin (ASA) 325 MG Tab Take 325 mg by mouth every day.     • ascorbic acid (ASCORBIC ACID) 500 MG Tab Take 500 mg by mouth every day.     • calcium-vitamin D (CALCIUM 500 + D) 500-125 MG-UNIT Tab Take 1 Tab by mouth every day.     • Omega-3 Fatty Acids (FISH OIL) 1000 MG Cap capsule Take 1 Cap by mouth 3 times a day, with meals. 90 Cap 0   • Cholecalciferol (VITAMIN D) 2000 UNITS Cap Take 1 Cap by mouth every day. (Patient taking differently: Take 2,000 Units by mouth 2 Times a Day.) 30 Cap 0   • timolol (TIMOPTIC) 0.5 % SOLN Place 1 Drop in both eyes 2 times a day.     • travoprost (TRAVOPROST) 0.004 % SOLN Place 1 Drop in left eye every evening.     • acetaminophen/caffeine/butalbital 325-40-50 mg (FIORICET) -40 MG Tab Take 1 Tab by mouth every four hours as needed for Headache. 30 Tab 1   • acetaminophen (TYLENOL) 325 MG Tab Take 2 Tabs by mouth every four hours as needed. 30 Tab 0     No facility-administered encounter medications on file as of 10/29/2018.      Allergies   Allergen Reactions   • Allegra      \"Gave me a bad headache   • Lipitor [Atorvastatin Calcium]      \"Hurts my bones\"   • Pravastatin      Back, legs and hip pain     DIET AND EXERCISE:  Weight Change: none   Diet: low fat  Exercise: minimal exercise     Review of Systems   Constitutional: Negative for chills, fever and malaise/fatigue.   HENT: Negative for nosebleeds, sore throat and tinnitus.    Eyes: Negative for blurred " "vision and double vision.   Respiratory: Negative for cough, hemoptysis, shortness of breath and wheezing.    Cardiovascular: Positive for leg swelling (mostly after extended standing). Negative for chest pain, palpitations and orthopnea.   Gastrointestinal: Negative for abdominal pain, blood in stool, diarrhea, melena, nausea and vomiting.   Genitourinary: Negative for hematuria.   Musculoskeletal: Negative for joint pain and myalgias.   Skin: Negative for itching and rash.   Neurological: Negative for dizziness, tremors, focal weakness, seizures, weakness and headaches.   Endo/Heme/Allergies: Does not bruise/bleed easily.   Psychiatric/Behavioral: Negative for depression. The patient is not nervous/anxious and does not have insomnia.       Objective:     Vitals:    10/29/18 1147   BP: 126/70   BP Location: Left arm   Patient Position: Sitting   BP Cuff Size: Adult   Pulse: 76   Weight: 90.2 kg (198 lb 14.4 oz)   Height: 1.6 m (5' 3\")      BP Readings from Last 4 Encounters:   10/29/18 126/70   09/24/18 110/78   05/14/18 116/72   04/10/18 118/72      Body mass index is 35.23 kg/m².   BMI Readings from Last 4 Encounters:   10/29/18 35.23 kg/m²   09/24/18 34.90 kg/m²   05/14/18 34.55 kg/m²   04/10/18 34.90 kg/m²      Physical Exam   Constitutional: She is oriented to person, place, and time. She appears well-developed and well-nourished. No distress.   HENT:   Head: Normocephalic and atraumatic.   Neck: Normal range of motion. Neck supple. No JVD present. No thyromegaly present.   Cardiovascular: Normal rate, regular rhythm, normal heart sounds and intact distal pulses.  Exam reveals no gallop and no friction rub.    No murmur heard.  Pulses:       Carotid pulses are 2+ on the right side, and 2+ on the left side.       Radial pulses are 2+ on the right side, and 2+ on the left side.        Dorsalis pedis pulses are 2+ on the right side, and 2+ on the left side.        Posterior tibial pulses are 2+ on the right side, " and 2+ on the left side.   Edema     RLE: none     LLE: none   Spider telangectasia:       RLE:  None      LLE: none   Varicosities:         RLE: none      LLE: none   Corona phlebectatica:      RLE:  None       LLE:  None   Cording:         RLE:  None     LLE: None      Pulmonary/Chest: Effort normal and breath sounds normal.   Abdominal: Soft. Bowel sounds are normal. She exhibits no distension and no mass. There is no tenderness. There is no rebound and no guarding.   Musculoskeletal: Normal range of motion. She exhibits no edema or tenderness.   Neurological: She is alert and oriented to person, place, and time. No cranial nerve deficit. She exhibits normal muscle tone. Coordination normal.   Skin: Skin is warm and dry. She is not diaphoretic.   Psychiatric: She has a normal mood and affect.     Lab Results   Component Value Date    CHOLSTRLTOT 205 (H) 09/17/2018     (H) 09/17/2018    HDL 56 09/17/2018    TRIGLYCERIDE 207 (H) 09/17/2018         Lab Results   Component Value Date    HBA1C 5.7 (H) 09/17/2018      Lab Results   Component Value Date    SODIUM 141 03/14/2018    POTASSIUM 3.6 03/14/2018    CHLORIDE 99 03/14/2018    CO2 25 03/14/2018    GLUCOSE 84 03/14/2018    BUN 12 03/14/2018    CREATININE 0.55 (L) 03/14/2018    BUNCREATRAT 22 03/14/2018    IFAFRICA 115 03/14/2018    IFNOTAFR 100 03/14/2018          Medical Decision Making:  Today's Assessment / Status / Plan:     1. Essential hypertension, benign     2. Obesity (BMI 35.0-39.9 without comorbidity) (HCC)     3. Dyslipidemia  COMP METABOLIC PANEL    LIPID PROFILE   4. Vitamin D deficiency     5. Varicose veins of left lower extremity, unspecified whether complicated     6. Prediabetes     7. Hypertriglyceridemia     8. Swelling of lower leg  US-EXTREMITY VENOUS LOWER BILAT    D-DIMER     Patient Type: Primary Prevention    Etiology of Established CVD if Present: hx of Afib     Lipid Management: Qualifies for Statin Therapy Based on 2013 ACC/AHA  Guidelines: yes  Calculated 10-Year Risk of ASCVD: N/A  Currently on Statin: No  NLA Risk Category:   High:  3+ major RFs, T1/T2D and 0-1 RF and no evidence of end-org damage  Tx threshold: non-HDL-C >129, LDL-C >99  Tx goal:  non-HDL <130, LDL-C <100 (optional: apoB<90)   At goal:  no  Tx plan:   - continue TLC and zetia 10mg and cholestyramine 4g daily   - add Cholest-off  - consider adding statin if not meeting goal in the future     Blood Pressure Management:Goal: ACC/AHA (2017) goal <130/80  Home BP at goal:  yes  Office BP at goal:  yes  Plan:   - continue home BP monitoring, reviewed correct technique:  Yes   - order 24h ABPM:  NO  - continue chlorthalidone 25mg daily   - continue losartan 50mg daily, increase to 100mg if BP not controlled     Glycemic Status: Prediabetic  - continue healthy diet, weight reduction, daily physical activity   - consider metformin up to 850mg BID to slow or offset progression to T2D as per DPP trial   - monitor labs Q6-12mo    Anti-Platelet/Anti-Coagulant Tx: yes.  Hx of Afib that is rate-controlled.  SIS-VASC score = 2 pt.  2 points  Stroke risk was 2.2% per year in >90,000 patients (the [place] Atrial Fibrillation Cohort Study) and 2.9% risk of stroke/TIA/systemic embolism.  Meets criteria for full anticoagulation but has had major GIB and is not willing to restart DOAC or VKA.  She is aware of elevated CVA risk w/o anticoagulation despite use of full-strength anticoag.    - continue ASA 325mg   - defer Afib and decisions for anticoag in the future to cardiology     Smoking: continued complete avoidance of all tobacco products        Physical Activity: engage in moderate to vigorous physical activity such as brisk walking, swimming, cycling, >150 minutes per week at 30-40 minutes per session, 3 to 5 times weekly    Weight Management and Nutrition: Dietary plan was discussed with patient at this visit including - consume diet that emphasizes intake of vegetables, fruits, and  whole grains,  - use low fat diary products, poultry, fish, legumes, nontropical vegetable oils, nuts  - limit intake of seets, sugar-sweetned beverages, and red meats   - reduce saturated and trans fats to <6% of your daily calories   - consume no more than 2,400mg of sodium daily (look at food labels)  - healthy diet plans reviewed including plate method, DASH, AHA diets     Other:   1) BLE varicosities, mild symptomatic.   - start compression    - r/o DVT with BLE duplex and D-dimer to include reflux study  - during air travel to walk hourly and complete isometric leg stretches  - f/u with [facility] for possible interventions     2) Afib - as noted above, defer to cardiology     3) obesity - as noted above     4) Vit D deficiency - continue Vitamin D3     Instructed to follow-up with PCP for remainder of adult medical needs: yes  We will partner with other providers in the management of established vascular disease and cardiometabolic risk factors.    Studies to Be Obtained:  BLE duplex for r/o DVT and reflux   Labs to Be Obtained:  D-dimer now,  Update CMP, lipid in 6 weeks     Follow up in: 6 weeks    Abhinav Uribe M.D.

## 2019-12-06 LAB
ALBUMIN SERPL-MCNC: 4.6 G/DL (ref 3.6–4.8)
ALBUMIN/CREAT UR: 9.6 MG/G CREAT (ref 0–30)
ALBUMIN/GLOB SERPL: 2 {RATIO} (ref 1.2–2.2)
ALP SERPL-CCNC: 92 IU/L (ref 39–117)
ALT SERPL-CCNC: 24 IU/L (ref 0–32)
AST SERPL-CCNC: 22 IU/L (ref 0–40)
BILIRUB SERPL-MCNC: 0.4 MG/DL (ref 0–1.2)
BUN SERPL-MCNC: 15 MG/DL (ref 8–27)
BUN/CREAT SERPL: 23 (ref 12–28)
CALCIUM SERPL-MCNC: 9.5 MG/DL (ref 8.7–10.3)
CHLORIDE SERPL-SCNC: 99 MMOL/L (ref 96–106)
CO2 SERPL-SCNC: 22 MMOL/L (ref 20–29)
CREAT SERPL-MCNC: 0.65 MG/DL (ref 0.57–1)
CREAT UR-MCNC: 130.7 MG/DL
GLOBULIN SER CALC-MCNC: 2.3 G/DL (ref 1.5–4.5)
GLUCOSE SERPL-MCNC: 103 MG/DL (ref 65–99)
HBA1C MFR BLD: 5.8 % (ref 4.8–5.6)
MICROALBUMIN UR-MCNC: 12.6 UG/ML
POTASSIUM SERPL-SCNC: 3.7 MMOL/L (ref 3.5–5.2)
PROT SERPL-MCNC: 6.9 G/DL (ref 6–8.5)
SODIUM SERPL-SCNC: 140 MMOL/L (ref 134–144)

## 2019-12-11 ENCOUNTER — OFFICE VISIT (OUTPATIENT)
Dept: MEDICAL GROUP | Facility: MEDICAL CENTER | Age: 64
End: 2019-12-11
Payer: MEDICARE

## 2019-12-11 VITALS
HEART RATE: 72 BPM | OXYGEN SATURATION: 95 % | HEIGHT: 63 IN | WEIGHT: 205 LBS | DIASTOLIC BLOOD PRESSURE: 70 MMHG | TEMPERATURE: 97.8 F | SYSTOLIC BLOOD PRESSURE: 110 MMHG | BODY MASS INDEX: 36.32 KG/M2

## 2019-12-11 DIAGNOSIS — I10 ESSENTIAL HYPERTENSION: ICD-10-CM

## 2019-12-11 DIAGNOSIS — K21.9 GASTROESOPHAGEAL REFLUX DISEASE WITHOUT ESOPHAGITIS: ICD-10-CM

## 2019-12-11 DIAGNOSIS — E66.9 OBESITY (BMI 30-39.9): ICD-10-CM

## 2019-12-11 DIAGNOSIS — R73.01 IFG (IMPAIRED FASTING GLUCOSE): ICD-10-CM

## 2019-12-11 PROCEDURE — 99214 OFFICE O/P EST MOD 30 MIN: CPT | Performed by: NURSE PRACTITIONER

## 2019-12-11 RX ORDER — CHLORTHALIDONE 25 MG/1
25 TABLET ORAL DAILY
Qty: 90 TAB | Refills: 3 | Status: SHIPPED | OUTPATIENT
Start: 2019-12-11 | End: 2021-01-11

## 2019-12-11 RX ORDER — OMEPRAZOLE 20 MG/1
20 CAPSULE, DELAYED RELEASE ORAL 2 TIMES DAILY
Qty: 180 CAP | Refills: 3 | Status: SHIPPED | OUTPATIENT
Start: 2019-12-11 | End: 2020-05-18 | Stop reason: SDUPTHER

## 2019-12-11 NOTE — PROGRESS NOTES
Subjective:     Sona Thakur is a 63 y.o. female who presents with GERD.    HPI:   Seen in f/u for GERD sx.  She was on omeprazole tid.  The GI recommended pantoprazole 40 mg daily in sept.  One month later she started having chronic GERD sx.  Even had some throwing up.  She told GI about having more sx.  Then GI sent in 40 mg bid.  She really just needs 20 mg bid.  Needs correct med called in wiht omeprazole.  She wants to switch back since it was working well.    She will need a podiatry referral.  She is having pain in top of rt foot.  Getting better slowly wiht elevating foot with ice.  May have injured it with helping her  transfer.  If doesn't get better with time will call for podiatry referral.  ED jean lab wiht pt.  Her CMP, GFR, alb/cr ratio is wnl.  a1c is up from 5.6 to 5.8.  She has been enjoying her carbs.  She remains active but not exercising.   Bp is stable on meds and well controlled.   Needs refill    Patient Active Problem List    Diagnosis Date Noted   • Obesity (BMI 30-39.9) 12/11/2019   • Primary osteoarthritis involving multiple joints 03/24/2019   • IFG (impaired fasting glucose) 03/24/2019   • Anesthesia    • Dyslipidemia 03/11/2019   • Varicose veins of left lower extremity, unspecified whether complicated 03/11/2019   • Obesity (BMI 35.0-39.9 without comorbidity) ([facility]) 11/15/2017   • Nasal step visual field defect of both eyes 10/11/2017   • Family history of breast cancer 09/15/2017   • Breast nodule 09/15/2017   • Cataract    • Glaucoma    • Atrial fibrillation ([facility]) 02/15/2013   • GERD (gastroesophageal reflux disease) 05/22/2012   • Allergic rhinitis 05/22/2012   • Hypertension 05/22/2012   • Preventative health care 11/09/2010       Current medicines (including changes today)  Current Outpatient Medications   Medication Sig Dispense Refill   • omeprazole (PRILOSEC) 20 MG delayed-release capsule Take 1 Cap by mouth 2 times a day. Take 1 cap am and 2 caps pm 180 Cap 3    • chlorthalidone (HYGROTON) 25 MG Tab Take 1 Tab by mouth every day. 90 Tab 3   • acetaminophen/caffeine/butalbital 325-40-50 mg (FIORICET) -40 MG Tab TAKE 1 TABLET BY MOUTH EVERY 4 HOURS AS NEEDED FOR  HEADACHE  (MUST  LAST  30  DAYS) 30 Tab 1   • acetaminophen/caffeine/butalbital 325-40-50 mg (FIORICET) -40 MG Tab Take 1 Tab by mouth every four hours as needed for Headache for up to 90 days. 30 Tab 1   • colesevelam (WELCHOL) 625 MG Tab Take 2 Tabs by mouth 2 times a day, with meals for 360 days. 360 Tab 6   • Pitavastatin Calcium (LIVALO) 1 MG Tab Take 1 mg by mouth every 48 hours. 90 Tab 3   • sertraline (ZOLOFT) 100 MG Tab TAKE 1 TABLET BY MOUTH ONCE DAILY 90 Tab 0   • valsartan (DIOVAN) 80 MG Tab TAKE 1 TABLET BY MOUTH ONCE DAILY 90 Tab 0   • potassium chloride ER (KLOR-CON) 10 MEQ tablet Take 10 mEq by mouth.  3   • potassium chloride SA (K-DUR) 10 MEQ Tab CR TAKE 1 TABLET BY MOUTH ONCE DAILY (Patient not taking: Reported on 9/9/2019) 90 Tab 3   • latanoprost (XALATAN) 0.005 % Solution      • timolol gel-forming (TIMOPTIC-XR) 0.5 % GEL FORMING SOLUTION      • aspirin EC (ECOTRIN) 81 MG Tablet Delayed Response Take 162 mg by mouth every day.     • ascorbic acid (ASCORBIC ACID) 500 MG Tab Take 500 mg by mouth every day.     • calcium-vitamin D (CALCIUM 500 + D) 500-125 MG-UNIT Tab Take 1 Tab by mouth every day.     • Omega-3 Fatty Acids (FISH OIL) 1000 MG Cap capsule Take 1 Cap by mouth 3 times a day, with meals. 90 Cap 0   • Cholecalciferol (VITAMIN D) 2000 UNITS Cap Take 1 Cap by mouth every day. (Patient taking differently: Take 2,000 Units by mouth 2 Times a Day.) 30 Cap 0   • acetaminophen (TYLENOL) 325 MG Tab Take 2 Tabs by mouth every four hours as needed. 30 Tab 0   • timolol (TIMOPTIC) 0.5 % SOLN Place 1 Drop in both eyes 2 times a day.     • travoprost (TRAVOPROST) 0.004 % SOLN Place 1 Drop in left eye every evening.       No current facility-administered medications for this visit.   "      Allergies   Allergen Reactions   • Allegra      \"Gave me a bad headache   • Lipitor [Atorvastatin Calcium]      \"Hurts my bones\"   • Pravastatin      Back, legs and hip pain   • Voltaren [Diclofenac]        ROS  Constitutional: Negative. Negative for fever, chills, weight loss, malaise/fatigue and diaphoresis.   HENT: Negative. Negative for hearing loss, ear pain, nosebleeds, congestion, sore throat, neck pain, tinnitus and ear discharge.   Respiratory: Negative. Negative for cough, hemoptysis, sputum production, shortness of breath, wheezing and stridor.   Cardiovascular: Negative. Negative for chest pain, palpitations, orthopnea, claudication, leg swelling and PND.   Gastrointestinal: Denies nausea, vomiting, diarrhea, constipation, heartburn, melena or hematochezia.  Genitourinary: Denies dysuria, hematuria, frequency or urgency.  Continues to have urinary incontinence d/t prolapse.  [facility]       Objective:     /70 (BP Location: Right arm, Patient Position: Sitting)   Pulse 72   Temp 36.6 °C (97.8 °F) (Temporal)   Ht 1.6 m (5' 3\")   Wt 93 kg (205 lb)   SpO2 95%  Body mass index is 36.31 kg/m².    Physical Exam:  Vitals reviewed.  Constitutional: Oriented to person, place, and time. appears well-developed and well-nourished. No distress.   Cardiovascular: Normal rate, regular rhythm, normal heart sounds and intact distal pulses. Exam reveals no gallop and no friction rub. No murmur heard. No carotid bruits.   Pulmonary/Chest: Effort normal and breath sounds normal. No stridor. No respiratory distress. no wheezes or rales. exhibits no tenderness.   Musculoskeletal: Normal range of motion. exhibits no edema. taran pedal pulses 2+.  Lymphadenopathy: No cervical or supraclavicular adenopathy.   Neurological: Alert and oriented to person, place, and time. exhibits normal muscle tone.  Skin: Skin is warm and dry. No diaphoresis.   Psychiatric: Normal mood and affect. Behavior is normal.    "   Assessment and Plan:     The following treatment plan was discussed:    1. IFG (impaired fasting glucose)      a1c up slightly.   no on meds. enc pt to  improved healthy diet and exercise.  plan:  recheck lab 6 mo.  call for lab slip.  f/u for review   2. Gastroesophageal reflux disease without esophagitis  omeprazole (PRILOSEC) 20 MG delayed-release capsule    does better on omeprazole 20 mg bid.  sent in refill.    3. Essential hypertension  chlorthalidone (HYGROTON) 25 MG Tab    stable on meds.  refilled meds   4. Obesity (BMI 30-39.9)  Patient identified as having weight management issue.  Appropriate orders and counseling given.         Followup: Return in about 6 months (around 6/11/2020).

## 2019-12-16 ENCOUNTER — APPOINTMENT (RX ONLY)
Dept: URBAN - METROPOLITAN AREA CLINIC 31 | Facility: CLINIC | Age: 64
Setting detail: DERMATOLOGY
End: 2019-12-16

## 2019-12-16 DIAGNOSIS — D22 MELANOCYTIC NEVI: ICD-10-CM

## 2019-12-16 PROBLEM — D48.5 NEOPLASM OF UNCERTAIN BEHAVIOR OF SKIN: Status: ACTIVE | Noted: 2019-12-16

## 2019-12-16 PROCEDURE — ? EXCISION

## 2019-12-16 PROCEDURE — 11402 EXC TR-EXT B9+MARG 1.1-2 CM: CPT

## 2019-12-16 PROCEDURE — 13121 CMPLX RPR S/A/L 2.6-7.5 CM: CPT

## 2019-12-16 PROCEDURE — ? PRESCRIPTION

## 2019-12-16 RX ORDER — SILVER SULFADIAZINE 10 MG/G
CREAM TOPICAL QD
Qty: 1 | Refills: 0 | Status: ERX | COMMUNITY
Start: 2019-12-16

## 2019-12-16 RX ADMIN — SILVER SULFADIAZINE: 10 CREAM TOPICAL at 00:00

## 2019-12-16 ASSESSMENT — LOCATION DETAILED DESCRIPTION DERM: LOCATION DETAILED: LEFT ANTERIOR PROXIMAL THIGH

## 2019-12-16 ASSESSMENT — LOCATION ZONE DERM: LOCATION ZONE: LEG

## 2019-12-16 ASSESSMENT — LOCATION SIMPLE DESCRIPTION DERM: LOCATION SIMPLE: LEFT THIGH

## 2019-12-16 NOTE — PROCEDURE: EXCISION
Modified Advancement Flap Text: The defect edges were debeveled with a #15 scalpel blade.  Given the location of the defect, shape of the defect and the proximity to free margins a modified advancement flap was deemed most appropriate.  Using a sterile surgical marker, an appropriate advancement flap was drawn incorporating the defect and placing the expected incisions within the relaxed skin tension lines where possible.    The area thus outlined was incised deep to adipose tissue with a #15 scalpel blade.  The skin margins were undermined to an appropriate distance in all directions utilizing iris scissors.
Did You Provide Opioid Counseling: No
Complex Repair And O-T Advancement Flap Text: The defect edges were debeveled with a #15 scalpel blade.  The primary defect was closed partially with a complex linear closure.  Given the location of the remaining defect, shape of the defect and the proximity to free margins an O-T advancement flap was deemed most appropriate for complete closure of the defect.  Using a sterile surgical marker, an appropriate advancement flap was drawn incorporating the defect and placing the expected incisions within the relaxed skin tension lines where possible.    The area thus outlined was incised deep to adipose tissue with a #15 scalpel blade.  The skin margins were undermined to an appropriate distance in all directions utilizing iris scissors.
Posterior Auricular Interpolation Flap Text: A decision was made to reconstruct the defect utilizing an interpolation axial flap and a staged reconstruction.  A telfa template was made of the defect.  This telfa template was then used to outline the posterior auricular interpolation flap.  The donor area for the pedicle flap was then injected with anesthesia.  The flap was excised through the skin and subcutaneous tissue down to the layer of the underlying musculature.  The pedicle flap was carefully excised within this deep plane to maintain its blood supply.  The edges of the donor site were undermined.   The donor site was closed in a primary fashion.  The pedicle was then rotated into position and sutured.  Once the tube was sutured into place, adequate blood supply was confirmed with blanching and refill.  The pedicle was then wrapped with xeroform gauze and dressed appropriately with a telfa and gauze bandage to ensure continued blood supply and protect the attached pedicle.
Complex Repair And Epidermal Autograft Text: The defect edges were debeveled with a #15 scalpel blade.  The primary defect was closed partially with a complex linear closure.  Given the location of the defect, shape of the defect and the proximity to free margins an epidermal autograft was deemed most appropriate to repair the remaining defect.  The graft was trimmed to fit the size of the remaining defect.  The graft was then placed in the primary defect, oriented appropriately, and sutured into place.
Island Pedicle Flap Text: The defect edges were debeveled with a #15 scalpel blade.  Given the location of the defect, shape of the defect and the proximity to free margins an island pedicle advancement flap was deemed most appropriate.  Using a sterile surgical marker, an appropriate advancement flap was drawn incorporating the defect, outlining the appropriate donor tissue and placing the expected incisions within the relaxed skin tension lines where possible.    The area thus outlined was incised deep to adipose tissue with a #15 scalpel blade.  The skin margins were undermined to an appropriate distance in all directions around the primary defect and laterally outward around the island pedicle utilizing iris scissors.  There was minimal undermining beneath the pedicle flap.
Mucosal Advancement Flap Text: Given the location of the defect, shape of the defect and the proximity to free margins a mucosal advancement flap was deemed most appropriate. Incisions were made with a 15 blade scalpel in the appropriate fashion along the cutaneous vermillion border and the mucosal lip. The remaining actinically damaged mucosal tissue was excised.  The mucosal advancement flap was then elevated to the gingival sulcus with care taken to preserve the neurovascular structures and advanced into the primary defect. Care was taken to ensure that precise realignment of the vermilion border was achieved.
Intermediate / Complex Repair - Final Wound Length In Cm: 3.5
Slit Excision Additional Text (Leave Blank If You Do Not Want): A linear line was drawn on the skin overlying the lesion. An incision was made slowly until the lesion was visualized.  Once visualized, the lesion was removed with blunt dissection.
Validate Note Data (See Information Below): Yes
Deep Sutures: 3-0 Maxon
Dressing: pressure dressing with telfa
Complex Repair And Dermal Autograft Text: The defect edges were debeveled with a #15 scalpel blade.  The primary defect was closed partially with a complex linear closure.  Given the location of the defect, shape of the defect and the proximity to free margins an dermal autograft was deemed most appropriate to repair the remaining defect.  The graft was trimmed to fit the size of the remaining defect.  The graft was then placed in the primary defect, oriented appropriately, and sutured into place.
Island Pedicle Flap With Canthal Suspension Text: The defect edges were debeveled with a #15 scalpel blade.  Given the location of the defect, shape of the defect and the proximity to free margins an island pedicle advancement flap was deemed most appropriate.  Using a sterile surgical marker, an appropriate advancement flap was drawn incorporating the defect, outlining the appropriate donor tissue and placing the expected incisions within the relaxed skin tension lines where possible. The area thus outlined was incised deep to adipose tissue with a #15 scalpel blade.  The skin margins were undermined to an appropriate distance in all directions around the primary defect and laterally outward around the island pedicle utilizing iris scissors.  There was minimal undermining beneath the pedicle flap. A suspension suture was placed in the canthal tendon to prevent tension and prevent ectropion.
Hatchet Flap Text: The defect edges were debeveled with a #15 scalpel blade.  Given the location of the defect, shape of the defect and the proximity to free margins a hatchet flap was deemed most appropriate.  Using a sterile surgical marker, an appropriate hatchet flap was drawn incorporating the defect and placing the expected incisions within the relaxed skin tension lines where possible.    The area thus outlined was incised deep to adipose tissue with a #15 scalpel blade.  The skin margins were undermined to an appropriate distance in all directions utilizing iris scissors.
Paramedian Forehead Flap Text: A decision was made to reconstruct the defect utilizing an interpolation axial flap and a staged reconstruction.  A telfa template was made of the defect.  This telfa template was then used to outline the paramedian forehead pedicle flap.  The donor area for the pedicle flap was then injected with anesthesia.  The flap was excised through the skin and subcutaneous tissue down to the layer of the underlying musculature.  The pedicle flap was carefully excised within this deep plane to maintain its blood supply.  The edges of the donor site were undermined.   The donor site was closed in a primary fashion.  The pedicle was then rotated into position and sutured.  Once the tube was sutured into place, adequate blood supply was confirmed with blanching and refill.  The pedicle was then wrapped with xeroform gauze and dressed appropriately with a telfa and gauze bandage to ensure continued blood supply and protect the attached pedicle.
Excisional Biopsy Additional Text (Leave Blank If You Do Not Want): The margin was drawn around the clinically apparent lesion. An elliptical shape was then drawn on the skin incorporating the lesion and margins.  Incisions were then made along these lines to the appropriate tissue plane and the lesion was extirpated.
Anesthesia Type: 1% lidocaine with epinephrine
Perilesional Excision Additional Text (Leave Blank If You Do Not Want): The margin was drawn around the clinically apparent lesion. Incisions were then made along these lines to the appropriate tissue plane and the lesion was extirpated.
Lip Wedge Excision Repair Text: Given the location of the defect and the proximity to free margins a full thickness wedge repair was deemed most appropriate.  Using a sterile surgical marker, the appropriate repair was drawn incorporating the defect and placing the expected incisions perpendicular to the vermilion border.  The vermilion border was also meticulously outlined to ensure appropriate reapproximation during the repair.  The area thus outlined was incised through and through with a #15 scalpel blade.  The muscularis and dermis were reaproximated with deep sutures following hemostasis. Care was taken to realign the vermilion border before proceeding with the superficial closure.  Once the vermilion was realigned the superfical and mucosal closure was finished.
Complex Repair And Tissue Cultured Epidermal Autograft Text: The defect edges were debeveled with a #15 scalpel blade.  The primary defect was closed partially with a complex linear closure.  Given the location of the defect, shape of the defect and the proximity to free margins an tissue cultured epidermal autograft was deemed most appropriate to repair the remaining defect.  The graft was trimmed to fit the size of the remaining defect.  The graft was then placed in the primary defect, oriented appropriately, and sutured into place.
Alar Island Pedicle Flap Text: The defect edges were debeveled with a #15 scalpel blade.  Given the location of the defect, shape of the defect and the proximity to the alar rim an island pedicle advancement flap was deemed most appropriate.  Using a sterile surgical marker, an appropriate advancement flap was drawn incorporating the defect, outlining the appropriate donor tissue and placing the expected incisions within the nasal ala running parallel to the alar rim. The area thus outlined was incised with a #15 scalpel blade.  The skin margins were undermined minimally to an appropriate distance in all directions around the primary defect and laterally outward around the island pedicle utilizing iris scissors.  There was minimal undermining beneath the pedicle flap.
Rotation Flap Text: The defect edges were debeveled with a #15 scalpel blade.  Given the location of the defect, shape of the defect and the proximity to free margins a rotation flap was deemed most appropriate.  Using a sterile surgical marker, an appropriate rotation flap was drawn incorporating the defect and placing the expected incisions within the relaxed skin tension lines where possible.    The area thus outlined was incised deep to adipose tissue with a #15 scalpel blade.  The skin margins were undermined to an appropriate distance in all directions utilizing iris scissors.
Ftsg Text: The defect edges were debeveled with a #15 scalpel blade.  Given the location of the defect, shape of the defect and the proximity to free margins a full thickness skin graft was deemed most appropriate.  Using a sterile surgical marker, the primary defect shape was transferred to the donor site. The area thus outlined was incised deep to adipose tissue with a #15 scalpel blade.  The harvested graft was then trimmed of adipose tissue until only dermis and epidermis was left.  The skin margins of the secondary defect were undermined to an appropriate distance in all directions utilizing iris scissors.  The secondary defect was closed with interrupted buried subcutaneous sutures.  The skin edges were then re-apposed with running  sutures.  The skin graft was then placed in the primary defect and oriented appropriately.
Complex Repair And Xenograft Text: The defect edges were debeveled with a #15 scalpel blade.  The primary defect was closed partially with a complex linear closure.  Given the location of the defect, shape of the defect and the proximity to free margins a xenograft was deemed most appropriate to repair the remaining defect.  The graft was trimmed to fit the size of the remaining defect.  The graft was then placed in the primary defect, oriented appropriately, and sutured into place.
Double Island Pedicle Flap Text: The defect edges were debeveled with a #15 scalpel blade.  Given the location of the defect, shape of the defect and the proximity to free margins a double island pedicle advancement flap was deemed most appropriate.  Using a sterile surgical marker, an appropriate advancement flap was drawn incorporating the defect, outlining the appropriate donor tissue and placing the expected incisions within the relaxed skin tension lines where possible.    The area thus outlined was incised deep to adipose tissue with a #15 scalpel blade.  The skin margins were undermined to an appropriate distance in all directions around the primary defect and laterally outward around the island pedicle utilizing iris scissors.  There was minimal undermining beneath the pedicle flap.
Spiral Flap Text: The defect edges were debeveled with a #15 scalpel blade.  Given the location of the defect, shape of the defect and the proximity to free margins a spiral flap was deemed most appropriate.  Using a sterile surgical marker, an appropriate rotation flap was drawn incorporating the defect and placing the expected incisions within the relaxed skin tension lines where possible. The area thus outlined was incised deep to adipose tissue with a #15 scalpel blade.  The skin margins were undermined to an appropriate distance in all directions utilizing iris scissors.
Wound Check: 14 days
Complex Repair And Skin Substitute Graft Text: The defect edges were debeveled with a #15 scalpel blade.  The primary defect was closed partially with a complex linear closure.  Given the location of the remaining defect, shape of the defect and the proximity to free margins a skin substitute graft was deemed most appropriate to repair the remaining defect.  The graft was trimmed to fit the size of the remaining defect.  The graft was then placed in the primary defect, oriented appropriately, and sutured into place.
Star Wedge Flap Text: The defect edges were debeveled with a #15 scalpel blade.  Given the location of the defect, shape of the defect and the proximity to free margins a star wedge flap was deemed most appropriate.  Using a sterile surgical marker, an appropriate rotation flap was drawn incorporating the defect and placing the expected incisions within the relaxed skin tension lines where possible. The area thus outlined was incised deep to adipose tissue with a #15 scalpel blade.  The skin margins were undermined to an appropriate distance in all directions utilizing iris scissors.
Repair Performed By Another Provider Text (Leave Blank If You Do Not Want): After the tissue was excised the defect was repaired by another provider.
Split-Thickness Skin Graft Text: The defect edges were debeveled with a #15 scalpel blade.  Given the location of the defect, shape of the defect and the proximity to free margins a split thickness skin graft was deemed most appropriate.  Using a sterile surgical marker, the primary defect shape was transferred to the donor site. The split thickness graft was then harvested.  The skin graft was then placed in the primary defect and oriented appropriately.
Island Pedicle Flap-Requiring Vessel Identification Text: The defect edges were debeveled with a #15 scalpel blade.  Given the location of the defect, shape of the defect and the proximity to free margins an island pedicle advancement flap was deemed most appropriate.  Using a sterile surgical marker, an appropriate advancement flap was drawn, based on the axial vessel mentioned above, incorporating the defect, outlining the appropriate donor tissue and placing the expected incisions within the relaxed skin tension lines where possible.    The area thus outlined was incised deep to adipose tissue with a #15 scalpel blade.  The skin margins were undermined to an appropriate distance in all directions around the primary defect and laterally outward around the island pedicle utilizing iris scissors.  There was minimal undermining beneath the pedicle flap.
No Repair - Repaired With Adjacent Surgical Defect Text (Leave Blank If You Do Not Want): After the excision the defect was repaired concurrently with another surgical defect which was in close approximation.
Cartilage Graft Text: The defect edges were debeveled with a #15 scalpel blade.  Given the location of the defect, shape of the defect, the fact the defect involved a full thickness cartilage defect a cartilage graft was deemed most appropriate.  An appropriate donor site was identified, cleansed, and anesthetized. The cartilage graft was then harvested and transferred to the recipient site, oriented appropriately and then sutured into place.  The secondary defect was then repaired using a primary closure.
Path Notes (To The Dermatopathologist): Please check margins.
Keystone Flap Text: The defect edges were debeveled with a #15 scalpel blade.  Given the location of the defect, shape of the defect a keystone flap was deemed most appropriate.  Using a sterile surgical marker, an appropriate keystone flap was drawn incorporating the defect, outlining the appropriate donor tissue and placing the expected incisions within the relaxed skin tension lines where possible. The area thus outlined was incised deep to adipose tissue with a #15 scalpel blade.  The skin margins were undermined to an appropriate distance in all directions around the primary defect and laterally outward around the flap utilizing iris scissors.
Transposition Flap Text: The defect edges were debeveled with a #15 scalpel blade.  Given the location of the defect and the proximity to free margins a transposition flap was deemed most appropriate.  Using a sterile surgical marker, an appropriate transposition flap was drawn incorporating the defect.    The area thus outlined was incised deep to adipose tissue with a #15 scalpel blade.  The skin margins were undermined to an appropriate distance in all directions utilizing iris scissors.
Complex Repair And O-L Flap Text: The defect edges were debeveled with a #15 scalpel blade.  The primary defect was closed partially with a complex linear closure.  Given the location of the remaining defect, shape of the defect and the proximity to free margins an O-L flap was deemed most appropriate for complete closure of the defect.  Using a sterile surgical marker, an appropriate flap was drawn incorporating the defect and placing the expected incisions within the relaxed skin tension lines where possible.    The area thus outlined was incised deep to adipose tissue with a #15 scalpel blade.  The skin margins were undermined to an appropriate distance in all directions utilizing iris scissors.
Composite Graft Text: The defect edges were debeveled with a #15 scalpel blade.  Given the location of the defect, shape of the defect, the proximity to free margins and the fact the defect was full thickness a composite graft was deemed most appropriate.  The defect was outline and then transferred to the donor site.  A full thickness graft was then excised from the donor site. The graft was then placed in the primary defect, oriented appropriately and then sutured into place.  The secondary defect was then repaired using a primary closure.
Advancement Flap (Single) Text: The defect edges were debeveled with a #15 scalpel blade.  Given the location of the defect and the proximity to free margins a single advancement flap was deemed most appropriate.  Using a sterile surgical marker, an appropriate advancement flap was drawn incorporating the defect and placing the expected incisions within the relaxed skin tension lines where possible.    The area thus outlined was incised deep to adipose tissue with a #15 scalpel blade.  The skin margins were undermined to an appropriate distance in all directions utilizing iris scissors.
Estimated Blood Loss (Cc): minimal
Additional Anesthesia Volume In Cc: 6
O-T Plasty Text: The defect edges were debeveled with a #15 scalpel blade.  Given the location of the defect, shape of the defect and the proximity to free margins an O-T plasty was deemed most appropriate.  Using a sterile surgical marker, an appropriate O-T plasty was drawn incorporating the defect and placing the expected incisions within the relaxed skin tension lines where possible.    The area thus outlined was incised deep to adipose tissue with a #15 scalpel blade.  The skin margins were undermined to an appropriate distance in all directions utilizing iris scissors.
Surgeon Performing Repair (Optional): Mehdi Trinh
Complex Repair Preamble Text (Leave Blank If You Do Not Want): Extensive wide undermining was performed.
Complex Repair And Bilobe Flap Text: The defect edges were debeveled with a #15 scalpel blade.  The primary defect was closed partially with a complex linear closure.  Given the location of the remaining defect, shape of the defect and the proximity to free margins a bilobe flap was deemed most appropriate for complete closure of the defect.  Using a sterile surgical marker, an appropriate advancement flap was drawn incorporating the defect and placing the expected incisions within the relaxed skin tension lines where possible.    The area thus outlined was incised deep to adipose tissue with a #15 scalpel blade.  The skin margins were undermined to an appropriate distance in all directions utilizing iris scissors.
Muscle Hinge Flap Text: The defect edges were debeveled with a #15 scalpel blade.  Given the size, depth and location of the defect and the proximity to free margins a muscle hinge flap was deemed most appropriate.  Using a sterile surgical marker, an appropriate hinge flap was drawn incorporating the defect. The area thus outlined was incised with a #15 scalpel blade.  The skin margins were undermined to an appropriate distance in all directions utilizing iris scissors.
Medical Necessity Information: It is in your best interest to select a reason for this procedure from the list below. All of these items fulfill various CMS LCD requirements except lesion extends to a margin.
Primary Defect Length (In Cm): 0
Melolabial Transposition Flap Text: The defect edges were debeveled with a #15 scalpel blade.  Given the location of the defect and the proximity to free margins a melolabial flap was deemed most appropriate.  Using a sterile surgical marker, an appropriate melolabial transposition flap was drawn incorporating the defect.    The area thus outlined was incised deep to adipose tissue with a #15 scalpel blade.  The skin margins were undermined to an appropriate distance in all directions utilizing iris scissors.
Complex Repair And Melolabial Flap Text: The defect edges were debeveled with a #15 scalpel blade.  The primary defect was closed partially with a complex linear closure.  Given the location of the remaining defect, shape of the defect and the proximity to free margins a melolabial flap was deemed most appropriate for complete closure of the defect.  Using a sterile surgical marker, an appropriate advancement flap was drawn incorporating the defect and placing the expected incisions within the relaxed skin tension lines where possible.    The area thus outlined was incised deep to adipose tissue with a #15 scalpel blade.  The skin margins were undermined to an appropriate distance in all directions utilizing iris scissors.
Advancement Flap (Double) Text: The defect edges were debeveled with a #15 scalpel blade.  Given the location of the defect and the proximity to free margins a double advancement flap was deemed most appropriate.  Using a sterile surgical marker, the appropriate advancement flaps were drawn incorporating the defect and placing the expected incisions within the relaxed skin tension lines where possible.    The area thus outlined was incised deep to adipose tissue with a #15 scalpel blade.  The skin margins were undermined to an appropriate distance in all directions utilizing iris scissors.
Epidermal Autograft Text: The defect edges were debeveled with a #15 scalpel blade.  Given the location of the defect, shape of the defect and the proximity to free margins an epidermal autograft was deemed most appropriate.  Using a sterile surgical marker, the primary defect shape was transferred to the donor site. The epidermal graft was then harvested.  The skin graft was then placed in the primary defect and oriented appropriately.
O-Z Plasty Text: The defect edges were debeveled with a #15 scalpel blade.  Given the location of the defect, shape of the defect and the proximity to free margins an O-Z plasty (double transposition flap) was deemed most appropriate.  Using a sterile surgical marker, the appropriate transposition flaps were drawn incorporating the defect and placing the expected incisions within the relaxed skin tension lines where possible.    The area thus outlined was incised deep to adipose tissue with a #15 scalpel blade.  The skin margins were undermined to an appropriate distance in all directions utilizing iris scissors.  Hemostasis was achieved with electrocautery.  The flaps were then transposed into place, one clockwise and the other counterclockwise, and anchored with interrupted buried subcutaneous sutures.
Intermediate Repair Preamble Text (Leave Blank If You Do Not Want): Undermining was performed with blunt dissection.
Epidermal Closure Graft Donor Site (Optional): simple interrupted
Burow's Advancement Flap Text: The defect edges were debeveled with a #15 scalpel blade.  Given the location of the defect and the proximity to free margins a Burow's advancement flap was deemed most appropriate.  Using a sterile surgical marker, the appropriate advancement flap was drawn incorporating the defect and placing the expected incisions within the relaxed skin tension lines where possible.    The area thus outlined was incised deep to adipose tissue with a #15 scalpel blade.  The skin margins were undermined to an appropriate distance in all directions utilizing iris scissors.
Complex Repair And Rotation Flap Text: The defect edges were debeveled with a #15 scalpel blade.  The primary defect was closed partially with a complex linear closure.  Given the location of the remaining defect, shape of the defect and the proximity to free margins a rotation flap was deemed most appropriate for complete closure of the defect.  Using a sterile surgical marker, an appropriate advancement flap was drawn incorporating the defect and placing the expected incisions within the relaxed skin tension lines where possible.    The area thus outlined was incised deep to adipose tissue with a #15 scalpel blade.  The skin margins were undermined to an appropriate distance in all directions utilizing iris scissors.
Consent was obtained from the patient. The risks and benefits to therapy were discussed in detail. Specifically, the risks of infection, scarring, bleeding, prolonged wound healing, incomplete removal, allergy to anesthesia, nerve injury and recurrence were addressed. Prior to the procedure, the treatment site was clearly identified and confirmed by the patient. All components of Universal Protocol/PAUSE Rule completed.
Dermal Autograft Text: The defect edges were debeveled with a #15 scalpel blade.  Given the location of the defect, shape of the defect and the proximity to free margins a dermal autograft was deemed most appropriate.  Using a sterile surgical marker, the primary defect shape was transferred to the donor site. The area thus outlined was incised deep to adipose tissue with a #15 scalpel blade.  The harvested graft was then trimmed of adipose and epidermal tissue until only dermis was left.  The skin graft was then placed in the primary defect and oriented appropriately.
Double O-Z Plasty Text: The defect edges were debeveled with a #15 scalpel blade.  Given the location of the defect, shape of the defect and the proximity to free margins a Double O-Z plasty (double transposition flap) was deemed most appropriate.  Using a sterile surgical marker, the appropriate transposition flaps were drawn incorporating the defect and placing the expected incisions within the relaxed skin tension lines where possible. The area thus outlined was incised deep to adipose tissue with a #15 scalpel blade.  The skin margins were undermined to an appropriate distance in all directions utilizing iris scissors.  Hemostasis was achieved with electrocautery.  The flaps were then transposed into place, one clockwise and the other counterclockwise, and anchored with interrupted buried subcutaneous sutures.
Epidermal Sutures: 3-0 Nylon
Rhombic Flap Text: The defect edges were debeveled with a #15 scalpel blade.  Given the location of the defect and the proximity to free margins a rhombic flap was deemed most appropriate.  Using a sterile surgical marker, an appropriate rhombic flap was drawn incorporating the defect.    The area thus outlined was incised deep to adipose tissue with a #15 scalpel blade.  The skin margins were undermined to an appropriate distance in all directions utilizing iris scissors.
Skin Substitute Text: The defect edges were debeveled with a #15 scalpel blade.  Given the location of the defect, shape of the defect and the proximity to free margins a skin substitute graft was deemed most appropriate.  The graft material was trimmed to fit the size of the defect. The graft was then placed in the primary defect and oriented appropriately.
Graft Donor Site Bandage (Optional-Leave Blank If You Don't Want In Note): Steri-strips and a pressure bandage were applied to the donor site.
Chonodrocutaneous Helical Advancement Flap Text: The defect edges were debeveled with a #15 scalpel blade.  Given the location of the defect and the proximity to free margins a chondrocutaneous helical advancement flap was deemed most appropriate.  Using a sterile surgical marker, the appropriate advancement flap was drawn incorporating the defect and placing the expected incisions within the relaxed skin tension lines where possible.    The area thus outlined was incised deep to adipose tissue with a #15 scalpel blade.  The skin margins were undermined to an appropriate distance in all directions utilizing iris scissors.
Where Do You Want The Question To Include Opioid Counseling Located?: Case Summary Tab
Hemostasis: Electrocautery
S Plasty Text: Given the location and shape of the defect, and the orientation of relaxed skin tension lines, an S-plasty was deemed most appropriate for repair.  Using a sterile surgical marker, the appropriate outline of the S-plasty was drawn, incorporating the defect and placing the expected incisions within the relaxed skin tension lines where possible.  The area thus outlined was incised deep to adipose tissue with a #15 scalpel blade.  The skin margins were undermined to an appropriate distance in all directions utilizing iris scissors. The skin flaps were advanced over the defect.  The opposing margins were then approximated with interrupted buried subcutaneous sutures.
Size Of Lesion In Cm: 0.4
Complex Repair And Rhombic Flap Text: The defect edges were debeveled with a #15 scalpel blade.  The primary defect was closed partially with a complex linear closure.  Given the location of the remaining defect, shape of the defect and the proximity to free margins a rhombic flap was deemed most appropriate for complete closure of the defect.  Using a sterile surgical marker, an appropriate advancement flap was drawn incorporating the defect and placing the expected incisions within the relaxed skin tension lines where possible.    The area thus outlined was incised deep to adipose tissue with a #15 scalpel blade.  The skin margins were undermined to an appropriate distance in all directions utilizing iris scissors.
Rhomboid Transposition Flap Text: The defect edges were debeveled with a #15 scalpel blade.  Given the location of the defect and the proximity to free margins a rhomboid transposition flap was deemed most appropriate.  Using a sterile surgical marker, an appropriate rhomboid flap was drawn incorporating the defect.    The area thus outlined was incised deep to adipose tissue with a #15 scalpel blade.  The skin margins were undermined to an appropriate distance in all directions utilizing iris scissors.
Bi-Rhombic Flap Text: The defect edges were debeveled with a #15 scalpel blade.  Given the location of the defect and the proximity to free margins a bi-rhombic flap was deemed most appropriate.  Using a sterile surgical marker, an appropriate rhombic flap was drawn incorporating the defect. The area thus outlined was incised deep to adipose tissue with a #15 scalpel blade.  The skin margins were undermined to an appropriate distance in all directions utilizing iris scissors.
Complex Repair And Transposition Flap Text: The defect edges were debeveled with a #15 scalpel blade.  The primary defect was closed partially with a complex linear closure.  Given the location of the remaining defect, shape of the defect and the proximity to free margins a transposition flap was deemed most appropriate for complete closure of the defect.  Using a sterile surgical marker, an appropriate advancement flap was drawn incorporating the defect and placing the expected incisions within the relaxed skin tension lines where possible.    The area thus outlined was incised deep to adipose tissue with a #15 scalpel blade.  The skin margins were undermined to an appropriate distance in all directions utilizing iris scissors.
Crescentic Advancement Flap Text: The defect edges were debeveled with a #15 scalpel blade.  Given the location of the defect and the proximity to free margins a crescentic advancement flap was deemed most appropriate.  Using a sterile surgical marker, the appropriate advancement flap was drawn incorporating the defect and placing the expected incisions within the relaxed skin tension lines where possible.    The area thus outlined was incised deep to adipose tissue with a #15 scalpel blade.  The skin margins were undermined to an appropriate distance in all directions utilizing iris scissors.
Tissue Cultured Epidermal Autograft Text: The defect edges were debeveled with a #15 scalpel blade.  Given the location of the defect, shape of the defect and the proximity to free margins a tissue cultured epidermal autograft was deemed most appropriate.  The graft was then trimmed to fit the size of the defect.  The graft was then placed in the primary defect and oriented appropriately.
Detail Level: Detailed
V-Y Plasty Text: The defect edges were debeveled with a #15 scalpel blade.  Given the location of the defect, shape of the defect and the proximity to free margins an V-Y advancement flap was deemed most appropriate.  Using a sterile surgical marker, an appropriate advancement flap was drawn incorporating the defect and placing the expected incisions within the relaxed skin tension lines where possible.    The area thus outlined was incised deep to adipose tissue with a #15 scalpel blade.  The skin margins were undermined to an appropriate distance in all directions utilizing iris scissors.
Complex Repair And V-Y Plasty Text: The defect edges were debeveled with a #15 scalpel blade.  The primary defect was closed partially with a complex linear closure.  Given the location of the remaining defect, shape of the defect and the proximity to free margins a V-Y plasty was deemed most appropriate for complete closure of the defect.  Using a sterile surgical marker, an appropriate advancement flap was drawn incorporating the defect and placing the expected incisions within the relaxed skin tension lines where possible.    The area thus outlined was incised deep to adipose tissue with a #15 scalpel blade.  The skin margins were undermined to an appropriate distance in all directions utilizing iris scissors.
Helical Rim Advancement Flap Text: The defect edges were debeveled with a #15 blade scalpel.  Given the location of the defect and the proximity to free margins (helical rim) a double helical rim advancement flap was deemed most appropriate.  Using a sterile surgical marker, the appropriate advancement flaps were drawn incorporating the defect and placing the expected incisions between the helical rim and antihelix where possible.  The area thus outlined was incised through and through with a #15 scalpel blade.  With a skin hook and iris scissors, the flaps were gently and sharply undermined and freed up.
Xenograft Text: The defect edges were debeveled with a #15 scalpel blade.  Given the location of the defect, shape of the defect and the proximity to free margins a xenograft was deemed most appropriate.  The graft was then trimmed to fit the size of the defect.  The graft was then placed in the primary defect and oriented appropriately.
Post-Care Instructions: I reviewed with the patient in detail post-care instructions. Patient is not to engage in any heavy lifting, exercise, or swimming for the next 14 days. Should the patient develop any fevers, chills, bleeding, severe pain patient will contact the office immediately.
A-T Advancement Flap Text: The defect edges were debeveled with a #15 scalpel blade.  Given the location of the defect, shape of the defect and the proximity to free margins an A-T advancement flap was deemed most appropriate.  Using a sterile surgical marker, an appropriate advancement flap was drawn incorporating the defect and placing the expected incisions within the relaxed skin tension lines where possible.    The area thus outlined was incised deep to adipose tissue with a #15 scalpel blade.  The skin margins were undermined to an appropriate distance in all directions utilizing iris scissors.
H Plasty Text: Given the location of the defect, shape of the defect and the proximity to free margins a H-plasty was deemed most appropriate for repair.  Using a sterile surgical marker, the appropriate advancement arms of the H-plasty were drawn incorporating the defect and placing the expected incisions within the relaxed skin tension lines where possible. The area thus outlined was incised deep to adipose tissue with a #15 scalpel blade. The skin margins were undermined to an appropriate distance in all directions utilizing iris scissors.  The opposing advancement arms were then advanced into place in opposite direction and anchored with interrupted buried subcutaneous sutures.
Billing Type: Third-Party Bill
Home Suture Removal Text: Patient was provided a home suture removal kit and will remove their sutures at home.  If they have any questions or difficulties they will call the office.
O-T Advancement Flap Text: The defect edges were debeveled with a #15 scalpel blade.  Given the location of the defect, shape of the defect and the proximity to free margins an O-T advancement flap was deemed most appropriate.  Using a sterile surgical marker, an appropriate advancement flap was drawn incorporating the defect and placing the expected incisions within the relaxed skin tension lines where possible.    The area thus outlined was incised deep to adipose tissue with a #15 scalpel blade.  The skin margins were undermined to an appropriate distance in all directions utilizing iris scissors.
Excision Depth: adipose tissue
W Plasty Text: The lesion was extirpated to the level of the fat with a #15 scalpel blade.  Given the location of the defect, shape of the defect and the proximity to free margins a W-plasty was deemed most appropriate for repair.  Using a sterile surgical marker, the appropriate transposition arms of the W-plasty were drawn incorporating the defect and placing the expected incisions within the relaxed skin tension lines where possible.    The area thus outlined was incised deep to adipose tissue with a #15 scalpel blade.  The skin margins were undermined to an appropriate distance in all directions utilizing iris scissors.  The opposing transposition arms were then transposed into place in opposite direction and anchored with interrupted buried subcutaneous sutures.
Information: Selecting Yes will display possible errors in your note based on the variables you have selected. This validation is only offered as a suggestion for you. PLEASE NOTE THAT THE VALIDATION TEXT WILL BE REMOVED WHEN YOU FINALIZE YOUR NOTE. IF YOU WANT TO FAX A PRELIMINARY NOTE YOU WILL NEED TO TOGGLE THIS TO 'NO' IF YOU DO NOT WANT IT IN YOUR FAXED NOTE.
Complex Repair And M Plasty Text: The defect edges were debeveled with a #15 scalpel blade.  The primary defect was closed partially with a complex linear closure.  Given the location of the remaining defect, shape of the defect and the proximity to free margins an M plasty was deemed most appropriate for complete closure of the defect.  Using a sterile surgical marker, an appropriate advancement flap was drawn incorporating the defect and placing the expected incisions within the relaxed skin tension lines where possible.    The area thus outlined was incised deep to adipose tissue with a #15 scalpel blade.  The skin margins were undermined to an appropriate distance in all directions utilizing iris scissors.
Bilateral Helical Rim Advancement Flap Text: The defect edges were debeveled with a #15 blade scalpel.  Given the location of the defect and the proximity to free margins (helical rim) a bilateral helical rim advancement flap was deemed most appropriate.  Using a sterile surgical marker, the appropriate advancement flaps were drawn incorporating the defect and placing the expected incisions between the helical rim and antihelix where possible.  The area thus outlined was incised through and through with a #15 scalpel blade.  With a skin hook and iris scissors, the flaps were gently and sharply undermined and freed up.
Purse String (Intermediate) Text: Given the location of the defect and the characteristics of the surrounding skin a purse string intermediate closure was deemed most appropriate.  Undermining was performed circumferentially around the surgical defect.  A purse string suture was then placed and tightened.
Ear Star Wedge Flap Text: The defect edges were debeveled with a #15 blade scalpel.  Given the location of the defect and the proximity to free margins (helical rim) an ear star wedge flap was deemed most appropriate.  Using a sterile surgical marker, the appropriate flap was drawn incorporating the defect and placing the expected incisions between the helical rim and antihelix where possible.  The area thus outlined was incised through and through with a #15 scalpel blade.
Excision Method: Elliptical
Complex Repair And Double M Plasty Text: The defect edges were debeveled with a #15 scalpel blade.  The primary defect was closed partially with a complex linear closure.  Given the location of the remaining defect, shape of the defect and the proximity to free margins a double M plasty was deemed most appropriate for complete closure of the defect.  Using a sterile surgical marker, an appropriate advancement flap was drawn incorporating the defect and placing the expected incisions within the relaxed skin tension lines where possible.    The area thus outlined was incised deep to adipose tissue with a #15 scalpel blade.  The skin margins were undermined to an appropriate distance in all directions utilizing iris scissors.
O-L Flap Text: The defect edges were debeveled with a #15 scalpel blade.  Given the location of the defect, shape of the defect and the proximity to free margins an O-L flap was deemed most appropriate.  Using a sterile surgical marker, an appropriate advancement flap was drawn incorporating the defect and placing the expected incisions within the relaxed skin tension lines where possible.    The area thus outlined was incised deep to adipose tissue with a #15 scalpel blade.  The skin margins were undermined to an appropriate distance in all directions utilizing iris scissors.
Purse String (Simple) Text: Given the location of the defect and the characteristics of the surrounding skin a purse string simple closure was deemed most appropriate.  Undermining was performed circumferentially around the surgical defect.  A purse string suture was then placed and tightened.
Z Plasty Text: The lesion was extirpated to the level of the fat with a #15 scalpel blade.  Given the location of the defect, shape of the defect and the proximity to free margins a Z-plasty was deemed most appropriate for repair.  Using a sterile surgical marker, the appropriate transposition arms of the Z-plasty were drawn incorporating the defect and placing the expected incisions within the relaxed skin tension lines where possible.    The area thus outlined was incised deep to adipose tissue with a #15 scalpel blade.  The skin margins were undermined to an appropriate distance in all directions utilizing iris scissors.  The opposing transposition arms were then transposed into place in opposite direction and anchored with interrupted buried subcutaneous sutures.
Scalpel Size: 10 blade
Complex Repair And W Plasty Text: The defect edges were debeveled with a #15 scalpel blade.  The primary defect was closed partially with a complex linear closure.  Given the location of the remaining defect, shape of the defect and the proximity to free margins a W plasty was deemed most appropriate for complete closure of the defect.  Using a sterile surgical marker, an appropriate advancement flap was drawn incorporating the defect and placing the expected incisions within the relaxed skin tension lines where possible.    The area thus outlined was incised deep to adipose tissue with a #15 scalpel blade.  The skin margins were undermined to an appropriate distance in all directions utilizing iris scissors.
Banner Transposition Flap Text: The defect edges were debeveled with a #15 scalpel blade.  Given the location of the defect and the proximity to free margins a Banner transposition flap was deemed most appropriate.  Using a sterile surgical marker, an appropriate flap drawn around the defect. The area thus outlined was incised deep to adipose tissue with a #15 scalpel blade.  The skin margins were undermined to an appropriate distance in all directions utilizing iris scissors.
Epidermal Closure: horizontal mattress
Complex Repair And Single Advancement Flap Text: The defect edges were debeveled with a #15 scalpel blade.  The primary defect was closed partially with a complex linear closure.  Given the location of the remaining defect, shape of the defect and the proximity to free margins a single advancement flap was deemed most appropriate for complete closure of the defect.  Using a sterile surgical marker, an appropriate advancement flap was drawn incorporating the defect and placing the expected incisions within the relaxed skin tension lines where possible.    The area thus outlined was incised deep to adipose tissue with a #15 scalpel blade.  The skin margins were undermined to an appropriate distance in all directions utilizing iris scissors.
O-Z Flap Text: The defect edges were debeveled with a #15 scalpel blade.  Given the location of the defect, shape of the defect and the proximity to free margins an O-Z flap was deemed most appropriate.  Using a sterile surgical marker, an appropriate transposition flap was drawn incorporating the defect and placing the expected incisions within the relaxed skin tension lines where possible. The area thus outlined was incised deep to adipose tissue with a #15 scalpel blade.  The skin margins were undermined to an appropriate distance in all directions utilizing iris scissors.
Cheek Interpolation Flap Text: A decision was made to reconstruct the defect utilizing an interpolation axial flap and a staged reconstruction.  A telfa template was made of the defect.  This telfa template was then used to outline the Cheek Interpolation flap.  The donor area for the pedicle flap was then injected with anesthesia.  The flap was excised through the skin and subcutaneous tissue down to the layer of the underlying musculature.  The interpolation flap was carefully excised within this deep plane to maintain its blood supply.  The edges of the donor site were undermined.   The donor site was closed in a primary fashion.  The pedicle was then rotated into position and sutured.  Once the tube was sutured into place, adequate blood supply was confirmed with blanching and refill.  The pedicle was then wrapped with xeroform gauze and dressed appropriately with a telfa and gauze bandage to ensure continued blood supply and protect the attached pedicle.
Medical Necessity Clause: This procedure was medically necessary because the lesion that was treated was:
Cheek-To-Nose Interpolation Flap Text: A decision was made to reconstruct the defect utilizing an interpolation axial flap and a staged reconstruction.  A telfa template was made of the defect.  This telfa template was then used to outline the Cheek-To-Nose Interpolation flap.  The donor area for the pedicle flap was then injected with anesthesia.  The flap was excised through the skin and subcutaneous tissue down to the layer of the underlying musculature.  The interpolation flap was carefully excised within this deep plane to maintain its blood supply.  The edges of the donor site were undermined.   The donor site was closed in a primary fashion.  The pedicle was then rotated into position and sutured.  Once the tube was sutured into place, adequate blood supply was confirmed with blanching and refill.  The pedicle was then wrapped with xeroform gauze and dressed appropriately with a telfa and gauze bandage to ensure continued blood supply and protect the attached pedicle.
Bilobed Flap Text: The defect edges were debeveled with a #15 scalpel blade.  Given the location of the defect and the proximity to free margins a bilobe flap was deemed most appropriate.  Using a sterile surgical marker, an appropriate bilobe flap drawn around the defect.    The area thus outlined was incised deep to adipose tissue with a #15 scalpel blade.  The skin margins were undermined to an appropriate distance in all directions utilizing iris scissors.
Complex Repair And Z Plasty Text: The defect edges were debeveled with a #15 scalpel blade.  The primary defect was closed partially with a complex linear closure.  Given the location of the remaining defect, shape of the defect and the proximity to free margins a Z plasty was deemed most appropriate for complete closure of the defect.  Using a sterile surgical marker, an appropriate advancement flap was drawn incorporating the defect and placing the expected incisions within the relaxed skin tension lines where possible.    The area thus outlined was incised deep to adipose tissue with a #15 scalpel blade.  The skin margins were undermined to an appropriate distance in all directions utilizing iris scissors.
Double O-Z Flap Text: The defect edges were debeveled with a #15 scalpel blade.  Given the location of the defect, shape of the defect and the proximity to free margins a Double O-Z flap was deemed most appropriate.  Using a sterile surgical marker, an appropriate transposition flap was drawn incorporating the defect and placing the expected incisions within the relaxed skin tension lines where possible. The area thus outlined was incised deep to adipose tissue with a #15 scalpel blade.  The skin margins were undermined to an appropriate distance in all directions utilizing iris scissors.
Complex Repair And Double Advancement Flap Text: The defect edges were debeveled with a #15 scalpel blade.  The primary defect was closed partially with a complex linear closure.  Given the location of the remaining defect, shape of the defect and the proximity to free margins a double advancement flap was deemed most appropriate for complete closure of the defect.  Using a sterile surgical marker, an appropriate advancement flap was drawn incorporating the defect and placing the expected incisions within the relaxed skin tension lines where possible.    The area thus outlined was incised deep to adipose tissue with a #15 scalpel blade.  The skin margins were undermined to an appropriate distance in all directions utilizing iris scissors.
Bilobed Transposition Flap Text: The defect edges were debeveled with a #15 scalpel blade.  Given the location of the defect and the proximity to free margins a bilobed transposition flap was deemed most appropriate.  Using a sterile surgical marker, an appropriate bilobe flap drawn around the defect.    The area thus outlined was incised deep to adipose tissue with a #15 scalpel blade.  The skin margins were undermined to an appropriate distance in all directions utilizing iris scissors.
Complex Repair And Dorsal Nasal Flap Text: The defect edges were debeveled with a #15 scalpel blade.  The primary defect was closed partially with a complex linear closure.  Given the location of the remaining defect, shape of the defect and the proximity to free margins a dorsal nasal flap was deemed most appropriate for complete closure of the defect.  Using a sterile surgical marker, an appropriate flap was drawn incorporating the defect and placing the expected incisions within the relaxed skin tension lines where possible.    The area thus outlined was incised deep to adipose tissue with a #15 scalpel blade.  The skin margins were undermined to an appropriate distance in all directions utilizing iris scissors.
Lab: [id number]
V-Y Flap Text: The defect edges were debeveled with a #15 scalpel blade.  Given the location of the defect, shape of the defect and the proximity to free margins a V-Y flap was deemed most appropriate.  Using a sterile surgical marker, an appropriate advancement flap was drawn incorporating the defect and placing the expected incisions within the relaxed skin tension lines where possible.    The area thus outlined was incised deep to adipose tissue with a #15 scalpel blade.  The skin margins were undermined to an appropriate distance in all directions utilizing iris scissors.
Complex Repair And Modified Advancement Flap Text: The defect edges were debeveled with a #15 scalpel blade.  The primary defect was closed partially with a complex linear closure.  Given the location of the remaining defect, shape of the defect and the proximity to free margins a modified advancement flap was deemed most appropriate for complete closure of the defect.  Using a sterile surgical marker, an appropriate advancement flap was drawn incorporating the defect and placing the expected incisions within the relaxed skin tension lines where possible.    The area thus outlined was incised deep to adipose tissue with a #15 scalpel blade.  The skin margins were undermined to an appropriate distance in all directions utilizing iris scissors.
Fusiform Excision Additional Text (Leave Blank If You Do Not Want): The margin was drawn around the clinically apparent lesion.  A fusiform shape was then drawn on the skin incorporating the lesion and margins.  Incisions were then made along these lines to the appropriate tissue plane and the lesion was extirpated.
Interpolation Flap Text: A decision was made to reconstruct the defect utilizing an interpolation axial flap and a staged reconstruction.  A telfa template was made of the defect.  This telfa template was then used to outline the interpolation flap.  The donor area for the pedicle flap was then injected with anesthesia.  The flap was excised through the skin and subcutaneous tissue down to the layer of the underlying musculature.  The interpolation flap was carefully excised within this deep plane to maintain its blood supply.  The edges of the donor site were undermined.   The donor site was closed in a primary fashion.  The pedicle was then rotated into position and sutured.  Once the tube was sutured into place, adequate blood supply was confirmed with blanching and refill.  The pedicle was then wrapped with xeroform gauze and dressed appropriately with a telfa and gauze bandage to ensure continued blood supply and protect the attached pedicle.
Repair Type: Complex
Complex Repair And Ftsg Text: The defect edges were debeveled with a #15 scalpel blade.  The primary defect was closed partially with a complex linear closure.  Given the location of the defect, shape of the defect and the proximity to free margins a full thickness skin graft was deemed most appropriate to repair the remaining defect.  The graft was trimmed to fit the size of the remaining defect.  The graft was then placed in the primary defect, oriented appropriately, and sutured into place.
Trilobed Flap Text: The defect edges were debeveled with a #15 scalpel blade.  Given the location of the defect and the proximity to free margins a trilobed flap was deemed most appropriate.  Using a sterile surgical marker, an appropriate trilobed flap drawn around the defect.    The area thus outlined was incised deep to adipose tissue with a #15 scalpel blade.  The skin margins were undermined to an appropriate distance in all directions utilizing iris scissors.
Lab Facility: 
Complex Repair And A-T Advancement Flap Text: The defect edges were debeveled with a #15 scalpel blade.  The primary defect was closed partially with a complex linear closure.  Given the location of the remaining defect, shape of the defect and the proximity to free margins an A-T advancement flap was deemed most appropriate for complete closure of the defect.  Using a sterile surgical marker, an appropriate advancement flap was drawn incorporating the defect and placing the expected incisions within the relaxed skin tension lines where possible.    The area thus outlined was incised deep to adipose tissue with a #15 scalpel blade.  The skin margins were undermined to an appropriate distance in all directions utilizing iris scissors.
Mercedes Flap Text: The defect edges were debeveled with a #15 scalpel blade.  Given the location of the defect, shape of the defect and the proximity to free margins a Mercedes flap was deemed most appropriate.  Using a sterile surgical marker, an appropriate advancement flap was drawn incorporating the defect and placing the expected incisions within the relaxed skin tension lines where possible. The area thus outlined was incised deep to adipose tissue with a #15 scalpel blade.  The skin margins were undermined to an appropriate distance in all directions utilizing iris scissors.
Undermining Location (Optional): in the superficial subcutaneous fat
Melolabial Interpolation Flap Text: A decision was made to reconstruct the defect utilizing an interpolation axial flap and a staged reconstruction.  A telfa template was made of the defect.  This telfa template was then used to outline the melolabial interpolation flap.  The donor area for the pedicle flap was then injected with anesthesia.  The flap was excised through the skin and subcutaneous tissue down to the layer of the underlying musculature.  The pedicle flap was carefully excised within this deep plane to maintain its blood supply.  The edges of the donor site were undermined.   The donor site was closed in a primary fashion.  The pedicle was then rotated into position and sutured.  Once the tube was sutured into place, adequate blood supply was confirmed with blanching and refill.  The pedicle was then wrapped with xeroform gauze and dressed appropriately with a telfa and gauze bandage to ensure continued blood supply and protect the attached pedicle.
Eliptical Excision Additional Text (Leave Blank If You Do Not Want): The margin was drawn around the clinically apparent lesion.  An elliptical shape was then drawn on the skin incorporating the lesion and margins.  Incisions were then made along these lines to the appropriate tissue plane and the lesion was extirpated.
Saucerization Excision Additional Text (Leave Blank If You Do Not Want): The margin was drawn around the clinically apparent lesion.  Incisions were then made along these lines, in a tangential fashion, to the appropriate tissue plane and the lesion was extirpated.
Mastoid Interpolation Flap Text: A decision was made to reconstruct the defect utilizing an interpolation axial flap and a staged reconstruction.  A telfa template was made of the defect.  This telfa template was then used to outline the mastoid interpolation flap.  The donor area for the pedicle flap was then injected with anesthesia.  The flap was excised through the skin and subcutaneous tissue down to the layer of the underlying musculature.  The pedicle flap was carefully excised within this deep plane to maintain its blood supply.  The edges of the donor site were undermined.   The donor site was closed in a primary fashion.  The pedicle was then rotated into position and sutured.  Once the tube was sutured into place, adequate blood supply was confirmed with blanching and refill.  The pedicle was then wrapped with xeroform gauze and dressed appropriately with a telfa and gauze bandage to ensure continued blood supply and protect the attached pedicle.
Dorsal Nasal Flap Text: The defect edges were debeveled with a #15 scalpel blade.  Given the location of the defect and the proximity to free margins a dorsal nasal flap was deemed most appropriate.  Using a sterile surgical marker, an appropriate dorsal nasal flap was drawn around the defect.    The area thus outlined was incised deep to adipose tissue with a #15 scalpel blade.  The skin margins were undermined to an appropriate distance in all directions utilizing iris scissors.
Wound Care: Vaseline
Complex Repair And Split-Thickness Skin Graft Text: The defect edges were debeveled with a #15 scalpel blade.  The primary defect was closed partially with a complex linear closure.  Given the location of the defect, shape of the defect and the proximity to free margins a split thickness skin graft was deemed most appropriate to repair the remaining defect.  The graft was trimmed to fit the size of the remaining defect.  The graft was then placed in the primary defect, oriented appropriately, and sutured into place.

## 2019-12-30 ENCOUNTER — APPOINTMENT (RX ONLY)
Dept: URBAN - METROPOLITAN AREA CLINIC 31 | Facility: CLINIC | Age: 64
Setting detail: DERMATOLOGY
End: 2019-12-30

## 2019-12-30 DIAGNOSIS — Z48.02 ENCOUNTER FOR REMOVAL OF SUTURES: ICD-10-CM

## 2019-12-30 PROCEDURE — ? SUTURE REMOVAL (GLOBAL PERIOD)

## 2019-12-30 ASSESSMENT — LOCATION ZONE DERM: LOCATION ZONE: LEG

## 2019-12-30 ASSESSMENT — LOCATION SIMPLE DESCRIPTION DERM: LOCATION SIMPLE: LEFT THIGH

## 2019-12-30 ASSESSMENT — LOCATION DETAILED DESCRIPTION DERM: LOCATION DETAILED: LEFT ANTERIOR PROXIMAL THIGH

## 2019-12-30 NOTE — PROCEDURE: SUTURE REMOVAL (GLOBAL PERIOD)
Detail Level: Detailed
Add 13504 Cpt? (Important Note: In 2017 The Use Of 08619 Is Being Tracked By Cms To Determine Future Global Period Reimbursement For Global Periods): no

## 2020-01-09 ENCOUNTER — OFFICE VISIT (OUTPATIENT)
Dept: URGENT CARE | Facility: CLINIC | Age: 65
End: 2020-01-09
Payer: MEDICARE

## 2020-01-09 VITALS
BODY MASS INDEX: 36.5 KG/M2 | WEIGHT: 206 LBS | HEIGHT: 63 IN | HEART RATE: 68 BPM | OXYGEN SATURATION: 93 % | DIASTOLIC BLOOD PRESSURE: 82 MMHG | SYSTOLIC BLOOD PRESSURE: 122 MMHG | RESPIRATION RATE: 18 BRPM | TEMPERATURE: 99.1 F

## 2020-01-09 DIAGNOSIS — J22 LRTI (LOWER RESPIRATORY TRACT INFECTION): ICD-10-CM

## 2020-01-09 PROCEDURE — 99214 OFFICE O/P EST MOD 30 MIN: CPT | Performed by: PHYSICIAN ASSISTANT

## 2020-01-09 RX ORDER — PREDNISONE 20 MG/1
40 TABLET ORAL DAILY
Qty: 10 TAB | Refills: 0 | Status: SHIPPED | OUTPATIENT
Start: 2020-01-09 | End: 2020-01-14

## 2020-01-09 RX ORDER — BENZONATATE 100 MG/1
100 CAPSULE ORAL 3 TIMES DAILY PRN
Qty: 30 CAP | Refills: 0 | Status: SHIPPED | OUTPATIENT
Start: 2020-01-09 | End: 2021-01-07

## 2020-01-09 RX ORDER — CODEINE PHOSPHATE/GUAIFENESIN 10-100MG/5
5 LIQUID (ML) ORAL
Qty: 50 ML | Refills: 0 | Status: SHIPPED | OUTPATIENT
Start: 2020-01-09 | End: 2020-01-19

## 2020-01-09 RX ORDER — AZITHROMYCIN 250 MG/1
TABLET, FILM COATED ORAL
Qty: 6 TAB | Refills: 0 | Status: SHIPPED | OUTPATIENT
Start: 2020-01-09 | End: 2021-01-07

## 2020-01-09 ASSESSMENT — ENCOUNTER SYMPTOMS
SORE THROAT: 1
DIZZINESS: 0
WHEEZING: 0
NECK PAIN: 0
COUGH: 1
VOMITING: 0
FEVER: 0
MYALGIAS: 1
EYE PAIN: 0
SHORTNESS OF BREATH: 0
CHILLS: 1
SPUTUM PRODUCTION: 1
EYE REDNESS: 0
PALPITATIONS: 0
EYE DISCHARGE: 0
HEADACHES: 1
NAUSEA: 0

## 2020-01-09 NOTE — PROGRESS NOTES
Subjective:      Sona Thakur is a 64 y.o. female who presents with Cough (bronchitis-x1 week got worse 2 days ago) and Groin Pain (started lasted after coughing too hard)            HPI  64-year-old female presents to urgent care with new problem of worsening productive cough, sinus drainage, nasal congestion and headaches about 8 days ago.  Patient reports onset of right-sided groin pain secondary to coughing last night. She denies abdominal pain, fevers, chills, or vomiting. Denies other associated aggravating or alleviating factors.      Review of Systems   Constitutional: Positive for chills. Negative for fever and malaise/fatigue.   HENT: Positive for congestion and sore throat. Negative for ear pain.    Eyes: Negative for pain, discharge and redness.   Respiratory: Positive for cough and sputum production. Negative for shortness of breath and wheezing.    Cardiovascular: Negative for chest pain and palpitations.   Gastrointestinal: Negative for nausea and vomiting.   Genitourinary: Negative for dysuria.   Musculoskeletal: Positive for myalgias. Negative for neck pain.   Skin: Negative for rash.   Neurological: Positive for headaches. Negative for dizziness.   Endo/Heme/Allergies: Negative for environmental allergies.       Past Medical History:   Diagnosis Date   • Allergy    • Anesthesia     nausea and vomitting   • Arthritis     osteoarthritis, right knee, left hand   • Atrial fibrillation ([facility]) 2/15/2013   • Breast nodule 9/15/2017   • Dyslipidemia    • Family history of breast cancer 9/15/2017   • GERD (gastroesophageal reflux disease)    • Glaucoma     left eye   • Hypertension    • IFG (impaired fasting glucose) 3/24/2019   • Nasal step visual field defect of both eyes 10/11/2017   • Obesity (BMI 35.0-39.9 without comorbidity) ([facility]) 11/15/2017   • Unspecified cataract     early stages   • Varicose veins of left lower extremity, unspecified whether complicated 3/11/2019     Current Outpatient  Medications on File Prior to Visit   Medication Sig Dispense Refill   • omeprazole (PRILOSEC) 20 MG delayed-release capsule Take 1 Cap by mouth 2 times a day. Take 1 cap am and 2 caps pm 180 Cap 3   • chlorthalidone (HYGROTON) 25 MG Tab Take 1 Tab by mouth every day. 90 Tab 3   • acetaminophen/caffeine/butalbital 325-40-50 mg (FIORICET) -40 MG Tab TAKE 1 TABLET BY MOUTH EVERY 4 HOURS AS NEEDED FOR  HEADACHE  (MUST  LAST  30  DAYS) 30 Tab 1   • acetaminophen/caffeine/butalbital 325-40-50 mg (FIORICET) -40 MG Tab Take 1 Tab by mouth every four hours as needed for Headache for up to 90 days. 30 Tab 1   • colesevelam (WELCHOL) 625 MG Tab Take 2 Tabs by mouth 2 times a day, with meals for 360 days. 360 Tab 6   • Pitavastatin Calcium (LIVALO) 1 MG Tab Take 1 mg by mouth every 48 hours. 90 Tab 3   • sertraline (ZOLOFT) 100 MG Tab TAKE 1 TABLET BY MOUTH ONCE DAILY 90 Tab 0   • valsartan (DIOVAN) 80 MG Tab TAKE 1 TABLET BY MOUTH ONCE DAILY 90 Tab 0   • potassium chloride ER (KLOR-CON) 10 MEQ tablet Take 10 mEq by mouth.  3   • potassium chloride SA (K-DUR) 10 MEQ Tab CR TAKE 1 TABLET BY MOUTH ONCE DAILY (Patient not taking: Reported on 9/9/2019) 90 Tab 3   • latanoprost (XALATAN) 0.005 % Solution      • timolol gel-forming (TIMOPTIC-XR) 0.5 % GEL FORMING SOLUTION      • aspirin EC (ECOTRIN) 81 MG Tablet Delayed Response Take 162 mg by mouth every day.     • ascorbic acid (ASCORBIC ACID) 500 MG Tab Take 500 mg by mouth every day.     • calcium-vitamin D (CALCIUM 500 + D) 500-125 MG-UNIT Tab Take 1 Tab by mouth every day.     • Omega-3 Fatty Acids (FISH OIL) 1000 MG Cap capsule Take 1 Cap by mouth 3 times a day, with meals. 90 Cap 0   • Cholecalciferol (VITAMIN D) 2000 UNITS Cap Take 1 Cap by mouth every day. (Patient taking differently: Take 2,000 Units by mouth 2 Times a Day.) 30 Cap 0   • acetaminophen (TYLENOL) 325 MG Tab Take 2 Tabs by mouth every four hours as needed. 30 Tab 0   • timolol (TIMOPTIC) 0.5 %  "SOLN Place 1 Drop in both eyes 2 times a day.     • travoprost (TRAVOPROST) 0.004 % SOLN Place 1 Drop in left eye every evening.       No current facility-administered medications on file prior to visit.      Allergies   Allergen Reactions   • Allegra      \"Gave me a bad headache   • Lipitor [Atorvastatin Calcium]      \"Hurts my bones\"   • Pravastatin      Back, legs and hip pain   • Voltaren [Diclofenac]      Social History     Tobacco Use   • Smoking status: Former Smoker     Packs/day: 0.50     Years: 5.00     Pack years: 2.50     Types: Cigars     Last attempt to quit: 2015     Years since quittin.9   • Smokeless tobacco: Never Used   Substance Use Topics   • Alcohol use: No     Alcohol/week: 0.0 oz      Objective:     /82 (BP Location: Left arm)   Pulse 68   Temp 37.3 °C (99.1 °F) (Temporal)   Resp 18   Ht 1.6 m (5' 3\")   Wt 93.4 kg (206 lb)   SpO2 93%   BMI 36.49 kg/m²      Physical Exam  Vitals signs reviewed.   Constitutional:       General: She is not in acute distress.     Appearance: Normal appearance. She is well-developed. She is not ill-appearing.   HENT:      Head: Normocephalic and atraumatic.      Right Ear: Tympanic membrane normal.      Left Ear: Tympanic membrane normal.      Nose: Mucosal edema, congestion and rhinorrhea present.      Mouth/Throat:      Mouth: Mucous membranes are moist.      Pharynx: Oropharynx is clear. Posterior oropharyngeal erythema present.   Eyes:      Extraocular Movements: Extraocular movements intact.      Conjunctiva/sclera: Conjunctivae normal.   Neck:      Musculoskeletal: Normal range of motion and neck supple.   Cardiovascular:      Rate and Rhythm: Normal rate and regular rhythm.      Heart sounds: Normal heart sounds.   Pulmonary:      Effort: Pulmonary effort is normal. No respiratory distress.      Breath sounds: Normal breath sounds.   Abdominal:      General: Bowel sounds are normal. There is no distension.      Palpations: Abdomen is " soft.   Musculoskeletal: Normal range of motion.   Skin:     General: Skin is warm and dry.      Findings: No rash.   Neurological:      General: No focal deficit present.      Mental Status: She is alert and oriented to person, place, and time.   Psychiatric:         Mood and Affect: Mood normal.         Behavior: Behavior normal.         Thought Content: Thought content normal.         Judgment: Judgment normal.                 Assessment/Plan:     1. LRTI (lower respiratory tract infection)  predniSONE (DELTASONE) 20 MG Tab    azithromycin (ZITHROMAX) 250 MG Tab    guaifenesin-codeine (TUSSI-ORGANIDIN NR) 100-10 MG/5ML syrup    benzonatate (TESSALON) 100 MG Cap     PT should follow up with PCP in 1-2 days for re-evaluation if symptoms have not improved.  Discussed red flags and reasons to return to UC or ED.  Pt and/or family verbalized understanding of diagnosis and follow up instructions and was offered informational handout on diagnosis.  PT discharged.

## 2020-02-12 ENCOUNTER — TELEPHONE (OUTPATIENT)
Dept: VASCULAR LAB | Facility: MEDICAL CENTER | Age: 65
End: 2020-02-12

## 2020-02-12 DIAGNOSIS — E78.5 HYPERLIPIDEMIA LDL GOAL <70: ICD-10-CM

## 2020-02-12 RX ORDER — PITAVASTATIN CALCIUM 1.04 MG/1
1 TABLET, FILM COATED ORAL
Qty: 45 TAB | Refills: 3 | Status: SHIPPED | OUTPATIENT
Start: 2020-02-12 | End: 2021-01-07

## 2020-02-12 RX ORDER — PITAVASTATIN CALCIUM 1.04 MG/1
1 TABLET, FILM COATED ORAL
Qty: 45 TAB | Refills: 3 | Status: SHIPPED
Start: 2020-02-12 | End: 2020-02-12

## 2020-02-13 NOTE — TELEPHONE ENCOUNTER
Received documentation that prior authorization for livalo was not approved.  [employer] is suggesting patient try pravastatin instead, but she has already failed pravastatin    Appeal letter completed  Medical assistant to submit    Await further coverage determination    Michael J Bloch, MD  Certified Clinical Lipid Specialist  Medial Director, [facility]

## 2020-03-17 ENCOUNTER — APPOINTMENT (OUTPATIENT)
Dept: VASCULAR LAB | Facility: MEDICAL CENTER | Age: 65
End: 2020-03-17
Payer: MEDICARE

## 2020-05-18 DIAGNOSIS — K21.9 GASTROESOPHAGEAL REFLUX DISEASE WITHOUT ESOPHAGITIS: ICD-10-CM

## 2020-05-18 RX ORDER — OMEPRAZOLE 20 MG/1
20 CAPSULE, DELAYED RELEASE ORAL 2 TIMES DAILY
Qty: 180 CAP | Refills: 3 | Status: SHIPPED | OUTPATIENT
Start: 2020-05-18 | End: 2021-06-07

## 2020-05-27 DIAGNOSIS — E87.6 HYPOKALEMIA: ICD-10-CM

## 2020-05-27 RX ORDER — POTASSIUM CHLORIDE 750 MG/1
TABLET, FILM COATED, EXTENDED RELEASE ORAL
Qty: 90 TAB | Refills: 2 | Status: SHIPPED | OUTPATIENT
Start: 2020-05-27 | End: 2021-01-07

## 2020-05-27 NOTE — TELEPHONE ENCOUNTER
Received request via: Pharmacy    Was the patient seen in the last year in this department? Yes 12/11/2019    Does the patient have an active prescription (recently filled or refills available) for medication(s) requested? No

## 2020-09-01 DIAGNOSIS — F43.9 STRESS: ICD-10-CM

## 2020-09-01 RX ORDER — SERTRALINE HYDROCHLORIDE 100 MG/1
TABLET, FILM COATED ORAL
Qty: 90 TAB | Refills: 1 | Status: SHIPPED | OUTPATIENT
Start: 2020-09-01 | End: 2021-03-04

## 2020-09-23 DIAGNOSIS — Z12.31 ENCOUNTER FOR SCREENING MAMMOGRAM FOR MALIGNANT NEOPLASM OF BREAST: ICD-10-CM

## 2020-10-26 DIAGNOSIS — R73.01 IMPAIRED FASTING GLUCOSE: ICD-10-CM

## 2020-10-26 DIAGNOSIS — E78.5 DYSLIPIDEMIA: ICD-10-CM

## 2020-10-26 DIAGNOSIS — E87.6 HYPOKALEMIA: ICD-10-CM

## 2020-10-26 DIAGNOSIS — I10 HYPERTENSION, ESSENTIAL: ICD-10-CM

## 2020-10-26 DIAGNOSIS — R00.2 HEART PALPITATIONS: ICD-10-CM

## 2020-10-26 RX ORDER — VALSARTAN 80 MG/1
TABLET ORAL
Qty: 30 TAB | Refills: 0 | Status: SHIPPED | OUTPATIENT
Start: 2020-10-26 | End: 2020-12-02

## 2020-10-26 NOTE — LETTER
October 27, 2020        Sona Thakur  [address] [address]        Dear Sona:    We have received a request from your pharmacy to refill your prescription(s). After careful review of your chart, we have noted you are due for labs and a follow up appointment.  We request you call our office at [phone] at your earliest convenience and make an appointment. I have included a fasting lab order for you.    However, when patients are not followed closely by their physician, potential medication complications may go unadressed. We look forward to scheduling an appointment for you, so that we may provide you with the safest and most complete medical care.        If you have any questions or concerns, please don't hesitate to call.        Sincerely,        DOMINIQUE Fountain.    Electronically Signed

## 2020-12-10 ENCOUNTER — APPOINTMENT (RX ONLY)
Dept: URBAN - METROPOLITAN AREA CLINIC 22 | Facility: CLINIC | Age: 65
Setting detail: DERMATOLOGY
End: 2020-12-10

## 2020-12-10 DIAGNOSIS — D18.0 HEMANGIOMA: ICD-10-CM

## 2020-12-10 DIAGNOSIS — L81.4 OTHER MELANIN HYPERPIGMENTATION: ICD-10-CM

## 2020-12-10 DIAGNOSIS — L91.8 OTHER HYPERTROPHIC DISORDERS OF THE SKIN: ICD-10-CM

## 2020-12-10 DIAGNOSIS — L82.1 OTHER SEBORRHEIC KERATOSIS: ICD-10-CM

## 2020-12-10 DIAGNOSIS — D22 MELANOCYTIC NEVI: ICD-10-CM

## 2020-12-10 DIAGNOSIS — Z71.89 OTHER SPECIFIED COUNSELING: ICD-10-CM

## 2020-12-10 DIAGNOSIS — L57.0 ACTINIC KERATOSIS: ICD-10-CM

## 2020-12-10 PROBLEM — D18.01 HEMANGIOMA OF SKIN AND SUBCUTANEOUS TISSUE: Status: ACTIVE | Noted: 2020-12-10

## 2020-12-10 PROBLEM — D22.5 MELANOCYTIC NEVI OF TRUNK: Status: ACTIVE | Noted: 2020-12-10

## 2020-12-10 PROCEDURE — 99214 OFFICE O/P EST MOD 30 MIN: CPT | Mod: 25

## 2020-12-10 PROCEDURE — ? SKIN TAG REMOVAL MULTI

## 2020-12-10 PROCEDURE — 17000 DESTRUCT PREMALG LESION: CPT | Mod: 59

## 2020-12-10 PROCEDURE — 11200 RMVL SKIN TAGS UP TO&INC 15: CPT

## 2020-12-10 PROCEDURE — ? COUNSELING

## 2020-12-10 PROCEDURE — ? LIQUID NITROGEN

## 2020-12-10 PROCEDURE — 17003 DESTRUCT PREMALG LES 2-14: CPT

## 2020-12-10 ASSESSMENT — LOCATION ZONE DERM
LOCATION ZONE: FACE
LOCATION ZONE: NOSE
LOCATION ZONE: NECK
LOCATION ZONE: TRUNK

## 2020-12-10 ASSESSMENT — LOCATION DETAILED DESCRIPTION DERM
LOCATION DETAILED: LEFT SUPERIOR MEDIAL UPPER BACK
LOCATION DETAILED: RIGHT CLAVICULAR NECK
LOCATION DETAILED: RIGHT INFERIOR LATERAL NECK
LOCATION DETAILED: LEFT CLAVICULAR NECK
LOCATION DETAILED: EPIGASTRIC SKIN
LOCATION DETAILED: RIGHT SUPERIOR CENTRAL MALAR CHEEK
LOCATION DETAILED: RIGHT NASAL SIDEWALL
LOCATION DETAILED: RIGHT SUPERIOR MEDIAL MIDBACK

## 2020-12-10 ASSESSMENT — LOCATION SIMPLE DESCRIPTION DERM
LOCATION SIMPLE: LEFT ANTERIOR NECK
LOCATION SIMPLE: RIGHT LOWER BACK
LOCATION SIMPLE: RIGHT NOSE
LOCATION SIMPLE: ABDOMEN
LOCATION SIMPLE: RIGHT ANTERIOR NECK
LOCATION SIMPLE: LEFT UPPER BACK
LOCATION SIMPLE: RIGHT CHEEK

## 2020-12-10 NOTE — PROCEDURE: SKIN TAG REMOVAL MULTI
Include Z78.9 (Other Specified Conditions Influencing Health Status) As An Associated Diagnosis?: Yes
Anesthesia Volume In Cc: 0
Consent: Written consent obtained and the risks of skin tag removal was reviewed with the patient including but not limited to bleeding, pigmentary change, infection, pain, and remote possibility of scarring.
Total Number Of Lesions Treated: 1
Medical Necessity Clause: This procedure was medically necessary because the lesions that were treated were:
Medical Necessity Information: It is in your best interest to select a reason for this procedure from the list below. All of these items fulfill various CMS LCD requirements except the new and changing color options.
Detail Level: Detailed

## 2020-12-31 ENCOUNTER — PATIENT OUTREACH (OUTPATIENT)
Dept: MEDICAL GROUP | Facility: MEDICAL CENTER | Age: 65
End: 2020-12-31

## 2021-01-01 LAB
ALBUMIN SERPL-MCNC: 4.3 G/DL (ref 3.8–4.8)
ALBUMIN/CREAT UR: 5 MG/G CREAT (ref 0–29)
ALBUMIN/GLOB SERPL: 2 {RATIO} (ref 1.2–2.2)
ALP SERPL-CCNC: 92 IU/L (ref 39–117)
ALT SERPL-CCNC: 27 IU/L (ref 0–32)
AST SERPL-CCNC: 33 IU/L (ref 0–40)
BILIRUB SERPL-MCNC: 0.5 MG/DL (ref 0–1.2)
BUN SERPL-MCNC: 10 MG/DL (ref 8–27)
BUN/CREAT SERPL: 17 (ref 12–28)
CALCIUM SERPL-MCNC: 9.4 MG/DL (ref 8.7–10.3)
CHLORIDE SERPL-SCNC: 99 MMOL/L (ref 96–106)
CHOLEST SERPL-MCNC: 164 MG/DL (ref 100–199)
CO2 SERPL-SCNC: 22 MMOL/L (ref 20–29)
CREAT SERPL-MCNC: 0.6 MG/DL (ref 0.57–1)
CREAT UR-MCNC: 76.7 MG/DL
GLOBULIN SER CALC-MCNC: 2.1 G/DL (ref 1.5–4.5)
GLUCOSE SERPL-MCNC: 85 MG/DL (ref 65–99)
HBA1C MFR BLD: 5.9 % (ref 4.8–5.6)
HDLC SERPL-MCNC: 58 MG/DL
LABORATORY COMMENT REPORT: NORMAL
LDLC SERPL CALC-MCNC: 87 MG/DL (ref 0–99)
MICROALBUMIN UR-MCNC: 3.8 UG/ML
POTASSIUM SERPL-SCNC: 3.9 MMOL/L (ref 3.5–5.2)
PROT SERPL-MCNC: 6.4 G/DL (ref 6–8.5)
SODIUM SERPL-SCNC: 138 MMOL/L (ref 134–144)
TRIGL SERPL-MCNC: 106 MG/DL (ref 0–149)
VLDLC SERPL CALC-MCNC: 19 MG/DL (ref 5–40)

## 2021-01-07 ENCOUNTER — OFFICE VISIT (OUTPATIENT)
Dept: MEDICAL GROUP | Facility: MEDICAL CENTER | Age: 66
End: 2021-01-07
Payer: MEDICARE

## 2021-01-07 VITALS
TEMPERATURE: 97.5 F | WEIGHT: 209 LBS | BODY MASS INDEX: 37.03 KG/M2 | HEART RATE: 64 BPM | OXYGEN SATURATION: 98 % | SYSTOLIC BLOOD PRESSURE: 108 MMHG | HEIGHT: 63 IN | DIASTOLIC BLOOD PRESSURE: 64 MMHG

## 2021-01-07 DIAGNOSIS — Z12.11 SCREENING FOR MALIGNANT NEOPLASM OF COLON: ICD-10-CM

## 2021-01-07 DIAGNOSIS — G43.909 MIGRAINE SYNDROME: ICD-10-CM

## 2021-01-07 DIAGNOSIS — N81.10 BLADDER PROLAPSE, FEMALE, ACQUIRED: ICD-10-CM

## 2021-01-07 DIAGNOSIS — E66.9 OBESITY (BMI 30-39.9): ICD-10-CM

## 2021-01-07 DIAGNOSIS — R73.01 IFG (IMPAIRED FASTING GLUCOSE): ICD-10-CM

## 2021-01-07 DIAGNOSIS — E78.5 HYPERLIPIDEMIA LDL GOAL <70: ICD-10-CM

## 2021-01-07 PROCEDURE — 99214 OFFICE O/P EST MOD 30 MIN: CPT | Performed by: NURSE PRACTITIONER

## 2021-01-07 RX ORDER — BUTALBITAL, ACETAMINOPHEN AND CAFFEINE 50; 325; 40 MG/1; MG/1; MG/1
1 TABLET ORAL EVERY 4 HOURS PRN
Qty: 30 TAB | Refills: 0 | Status: SHIPPED | OUTPATIENT
Start: 2021-01-07 | End: 2021-07-14

## 2021-01-07 RX ORDER — BUTALBITAL, ACETAMINOPHEN AND CAFFEINE 50; 325; 40 MG/1; MG/1; MG/1
1 TABLET ORAL EVERY 4 HOURS PRN
COMMUNITY
End: 2021-01-07 | Stop reason: SDUPTHER

## 2021-01-07 RX ORDER — ROSUVASTATIN CALCIUM 10 MG/1
10 TABLET, COATED ORAL EVERY EVENING
COMMUNITY

## 2021-01-07 RX ORDER — UBIDECARENONE 75 MG
100 CAPSULE ORAL DAILY
COMMUNITY

## 2021-01-07 RX ORDER — COLESEVELAM 180 1/1
625 TABLET ORAL 2 TIMES DAILY WITH MEALS
Qty: 180 TAB | Refills: 3 | Status: SHIPPED | OUTPATIENT
Start: 2021-01-07 | End: 2021-10-07 | Stop reason: SDUPTHER

## 2021-01-07 ASSESSMENT — PATIENT HEALTH QUESTIONNAIRE - PHQ9: CLINICAL INTERPRETATION OF PHQ2 SCORE: 0

## 2021-01-07 NOTE — PROGRESS NOTES
Subjective:     Sona Thakur is a 65 y.o. female who presents with hyperlipidemia.    HPI:   Seen in f/u for hyperlipidemia.  [facility]  At [facility] appt there her LP showed her trg and total chol highest its ever been.  They put her on crestor.  Taking 1 tab alt w/1/2 daily.  She is still on welchol 2 tabs bid.  Will try and dec to 1 tab bid.   She has recie[facility]. Doesn't know which pneumonia shot she received.  She will check with them.   Reviewed lab with pt.  Her CMP, LP, alb/cr ratio IS WNL  A1C is up sl from 5.8 to 5.9.  She has bladder prolapse.  No recurrent infections.  She doesn't feel that she can do repair yet since she has to lift her .  If she did repair she could ruin the surgery when she had to lift.  Urology has told her that pessary won't work.      Patient Active Problem List    Diagnosis Date Noted   • Obesity (BMI 30-39.9) 12/11/2019   • Primary osteoarthritis involving multiple joints 03/24/2019   • IFG (impaired fasting glucose) 03/24/2019   • Anesthesia    • Dyslipidemia 03/11/2019   • Varicose veins of left lower extremity, unspecified whether complicated 03/11/2019   • Obesity (BMI 35.0-39.9 without comorbidity) ([facility]) 11/15/2017   • Nasal step visual field defect of both eyes 10/11/2017   • Family history of breast cancer 09/15/2017   • Breast nodule 09/15/2017   • Cataract    • Glaucoma    • Atrial fibrillation (HCC) 02/15/2013   • GERD (gastroesophageal reflux disease) 05/22/2012   • Allergic rhinitis 05/22/2012   • Hypertension 05/22/2012   • Preventative health care 11/09/2010       Current medicines (including changes today)  Current Outpatient Medications   Medication Sig Dispense Refill   • cyanocobalamin (VITAMIN B-12) 100 MCG Tab Take 100 mcg by mouth every day.     • rosuvastatin (CRESTOR) 10 MG Tab Take 10 mg by mouth every evening. Taking 1 tab alt w/1/2 tab daily     • coenzyme Q-10 30 MG capsule Take 60 mg by mouth  "every day.     • butalbital/apap/caffeine -40 mg (FIORICET) -40 MG Tab Take 1 Tab by mouth every four hours as needed for Headache for up to 180 days. 30 Tab 0   • colesevelam (WELCHOL) 625 MG Tab Take 1 Tab by mouth 2 times a day, with meals. 180 Tab 3   • valsartan (DIOVAN) 80 MG Tab Take 1 tablet by mouth once daily 90 Tab 0   • sertraline (ZOLOFT) 100 MG Tab Take 1 tablet by mouth once daily 90 Tab 1   • omeprazole (PRILOSEC) 20 MG delayed-release capsule Take 1 Cap by mouth 2 times a day. 180 Cap 3   • chlorthalidone (HYGROTON) 25 MG Tab Take 1 Tab by mouth every day. 90 Tab 3   • potassium chloride ER (KLOR-CON) 10 MEQ tablet Take 10 mEq by mouth.  3   • latanoprost (XALATAN) 0.005 % Solution      • timolol gel-forming (TIMOPTIC-XR) 0.5 % GEL FORMING SOLUTION      • aspirin EC (ECOTRIN) 81 MG Tablet Delayed Response Take 162 mg by mouth every day.     • ascorbic acid (ASCORBIC ACID) 500 MG Tab Take 500 mg by mouth every day.     • calcium-vitamin D (CALCIUM 500 + D) 500-125 MG-UNIT Tab Take 1 Tab by mouth every day.     • Cholecalciferol (VITAMIN D) 2000 UNITS Cap Take 1 Cap by mouth every day. (Patient taking differently: Take 2,000 Units by mouth 2 Times a Day.) 30 Cap 0   • acetaminophen (TYLENOL) 325 MG Tab Take 2 Tabs by mouth every four hours as needed. 30 Tab 0   • travoprost (TRAVOPROST) 0.004 % SOLN Place 1 Drop in left eye every evening.       No current facility-administered medications for this visit.        Allergies   Allergen Reactions   • Allegra      \"Gave me a bad headache   • Lipitor [Atorvastatin Calcium]      \"Hurts my bones\"   • Pravastatin      Back, legs and hip pain   • Voltaren [Diclofenac]        ROS  Constitutional: Negative. Negative for fever, chills, weight loss, malaise/fatigue and diaphoresis.   HENT: Negative. Negative for hearing loss, ear pain, nosebleeds, congestion, sore throat, neck pain, tinnitus and ear discharge.   Respiratory: Negative. Negative for cough, " "hemoptysis, sputum production, shortness of breath, wheezing and stridor.   Cardiovascular: Negative. Negative for chest pain, palpitations, orthopnea, claudication, leg swelling and PND.   Gastrointestinal: Denies nausea, vomiting, diarrhea, constipation, heartburn, melena or hematochezia.  Genitourinary: Denies dysuria, hematuria, urinary incontinence, frequency or urgency.        Objective:     /64 (BP Location: Right arm, Patient Position: Sitting)   Pulse 64   Temp 36.4 °C (97.5 °F) (Temporal)   Ht 1.6 m (5' 3\")   Wt 94.8 kg (209 lb)   SpO2 98%  Body mass index is 37.02 kg/m².    Physical Exam:  Vitals reviewed.  Constitutional: Oriented to person, place, and time. appears well-developed and well-nourished. No distress.   Cardiovascular: Normal rate, regular rhythm, normal heart sounds and intact distal pulses. Exam reveals no gallop and no friction rub. No murmur heard. No carotid bruits.   Pulmonary/Chest: Effort normal and breath sounds normal. No stridor. No respiratory distress. no wheezes or rales. exhibits no tenderness.   Musculoskeletal: Normal range of motion. exhibits no edema. taran pedal pulses 2+.  Lymphadenopathy: No cervical or supraclavicular adenopathy.   Neurological: Alert and oriented to person, place, and time. exhibits normal muscle tone.  Skin: Skin is warm and dry. No diaphoresis.   Psychiatric: Normal mood and affect. Behavior is normal.      Assessment and Plan:     The following treatment plan was discussed:    1. IFG (impaired fasting glucose)      a1c stable w/o med.  enc pt to improve healthy diet and inc exercise.    2. Hyperlipidemia LDL goal <70  colesevelam (WELCHOL) 625 MG Tab    stable on welchol and crestor.  dec welchol to i tab bid.     3. Migraine syndrome  butalbital/apap/caffeine -40 mg (FIORICET) -40 MG Tab    refilled fiorcet 30 tabs for 6 mo.  stable on med   4. Bladder prolapse, female, acquired      has seen urology.  going to wait on repair.  " no recurrent UTI   5. Screening for malignant neoplasm of colon  COLOGUARD (FIT DNA)   6. Obesity (BMI 30-39.9)  Patient identified as having weight management issue.  Appropriate orders and counseling given.         Followup: Return in about 1 year (around 1/7/2022).

## 2021-03-03 DIAGNOSIS — Z23 NEED FOR VACCINATION: ICD-10-CM

## 2021-03-04 DIAGNOSIS — E87.6 HYPOKALEMIA: ICD-10-CM

## 2021-03-04 DIAGNOSIS — F43.9 STRESS: ICD-10-CM

## 2021-03-04 DIAGNOSIS — R00.2 HEART PALPITATIONS: ICD-10-CM

## 2021-03-04 DIAGNOSIS — I10 HYPERTENSION, ESSENTIAL: ICD-10-CM

## 2021-03-04 RX ORDER — SERTRALINE HYDROCHLORIDE 100 MG/1
TABLET, FILM COATED ORAL
Qty: 90 TABLET | Refills: 3 | Status: SHIPPED | OUTPATIENT
Start: 2021-03-04 | End: 2021-10-07 | Stop reason: SDUPTHER

## 2021-03-04 RX ORDER — VALSARTAN 80 MG/1
TABLET ORAL
Qty: 90 TABLET | Refills: 3 | Status: SHIPPED | OUTPATIENT
Start: 2021-03-04 | End: 2021-10-07 | Stop reason: SDUPTHER

## 2021-03-09 RX ORDER — POTASSIUM CHLORIDE 750 MG/1
10 TABLET, FILM COATED, EXTENDED RELEASE ORAL DAILY
Qty: 90 TABLET | Refills: 3 | Status: SHIPPED | OUTPATIENT
Start: 2021-03-09 | End: 2021-10-07 | Stop reason: SDUPTHER

## 2021-04-16 NOTE — TELEPHONE ENCOUNTER
Left message for patient to call back at [phone].     Need for [facility] evaluation was communicated by Dr Chase today.  Spoke with daughter Rebecca and explained the hospice philosophy, care provided at [facility] including but not limited to pharmacologic and nonpharmaclogic interventions, hospice benefits, transition of care process, levels of hospice care provided, and insurance/room and board coverage related to different levels of care. Rebecca verbalized understanding.     Patient meets general inpatient criteria for [facility] due to acute respiratory management needs. Family is accepting and in agreement of transfer to [facility] today at 1700.  [facility] updated and will schedule transport for 1630.  Hospital care team updated on patient acceptance to [facility].   Staff RN updated and will call RN to RN report at [phone]. [facility] hospice order received. State DNR bracelet to be completed  and placed prior to discharge.  POA was activated. POA will be present at [facility] admission.     Please medicate patient for comfort prior to transfer. Liaison to continue to follow until discharge.     Thank you for the referral.   Charissa Gonzalez RN  Hospice & Palliative Care Liaison  [phone]

## 2021-06-07 DIAGNOSIS — K21.9 GASTROESOPHAGEAL REFLUX DISEASE WITHOUT ESOPHAGITIS: ICD-10-CM

## 2021-06-07 RX ORDER — OMEPRAZOLE 20 MG/1
CAPSULE, DELAYED RELEASE ORAL
Qty: 180 CAPSULE | Refills: 3 | Status: SHIPPED | OUTPATIENT
Start: 2021-06-07 | End: 2021-10-07 | Stop reason: SDUPTHER

## 2021-07-14 DIAGNOSIS — G43.909 MIGRAINE SYNDROME: ICD-10-CM

## 2021-07-14 RX ORDER — BUTALBITAL, ACETAMINOPHEN AND CAFFEINE 50; 325; 40 MG/1; MG/1; MG/1
TABLET ORAL
Qty: 30 TABLET | Refills: 0 | Status: SHIPPED | OUTPATIENT
Start: 2021-07-14 | End: 2021-10-07 | Stop reason: SDUPTHER

## 2021-07-19 ENCOUNTER — HOSPITAL ENCOUNTER (OUTPATIENT)
Dept: RADIOLOGY | Facility: MEDICAL CENTER | Age: 66
End: 2021-07-19
Attending: STUDENT IN AN ORGANIZED HEALTH CARE EDUCATION/TRAINING PROGRAM
Payer: MEDICARE

## 2021-07-19 DIAGNOSIS — Z12.31 VISIT FOR SCREENING MAMMOGRAM: ICD-10-CM

## 2021-07-19 PROCEDURE — 77063 BREAST TOMOSYNTHESIS BI: CPT

## 2021-08-04 ENCOUNTER — OFFICE VISIT (OUTPATIENT)
Dept: MEDICAL GROUP | Facility: MEDICAL CENTER | Age: 66
End: 2021-08-04
Payer: MEDICARE

## 2021-08-04 ENCOUNTER — HOSPITAL ENCOUNTER (OUTPATIENT)
Dept: LAB | Facility: MEDICAL CENTER | Age: 66
End: 2021-08-04
Attending: INTERNAL MEDICINE
Payer: MEDICARE

## 2021-08-04 VITALS
SYSTOLIC BLOOD PRESSURE: 114 MMHG | DIASTOLIC BLOOD PRESSURE: 68 MMHG | WEIGHT: 202 LBS | HEART RATE: 60 BPM | BODY MASS INDEX: 35.79 KG/M2 | HEIGHT: 63 IN | OXYGEN SATURATION: 95 % | TEMPERATURE: 96.8 F

## 2021-08-04 DIAGNOSIS — R19.7 ACUTE DIARRHEA: ICD-10-CM

## 2021-08-04 DIAGNOSIS — K92.1 MELENA: ICD-10-CM

## 2021-08-04 LAB
ALBUMIN SERPL BCP-MCNC: 4 G/DL (ref 3.2–4.9)
ALBUMIN/GLOB SERPL: 1.4 G/DL
ALP SERPL-CCNC: 86 U/L (ref 30–99)
ALT SERPL-CCNC: 21 U/L (ref 2–50)
ANION GAP SERPL CALC-SCNC: 13 MMOL/L (ref 7–16)
AST SERPL-CCNC: 25 U/L (ref 12–45)
BASOPHILS # BLD AUTO: 0.5 % (ref 0–1.8)
BASOPHILS # BLD: 0.03 K/UL (ref 0–0.12)
BILIRUB SERPL-MCNC: 0.3 MG/DL (ref 0.1–1.5)
BUN SERPL-MCNC: 15 MG/DL (ref 8–22)
CALCIUM SERPL-MCNC: 9.4 MG/DL (ref 8.4–10.2)
CHLORIDE SERPL-SCNC: 100 MMOL/L (ref 96–112)
CO2 SERPL-SCNC: 25 MMOL/L (ref 20–33)
CREAT SERPL-MCNC: 0.62 MG/DL (ref 0.5–1.4)
EOSINOPHIL # BLD AUTO: 0.14 K/UL (ref 0–0.51)
EOSINOPHIL NFR BLD: 2.3 % (ref 0–6.9)
ERYTHROCYTE [DISTWIDTH] IN BLOOD BY AUTOMATED COUNT: 40.3 FL (ref 35.9–50)
GLOBULIN SER CALC-MCNC: 2.9 G/DL (ref 1.9–3.5)
GLUCOSE SERPL-MCNC: 107 MG/DL (ref 65–99)
HCT VFR BLD AUTO: 39.2 % (ref 37–47)
HGB BLD-MCNC: 13.1 G/DL (ref 12–16)
IMM GRANULOCYTES # BLD AUTO: 0.02 K/UL (ref 0–0.11)
IMM GRANULOCYTES NFR BLD AUTO: 0.3 % (ref 0–0.9)
LYMPHOCYTES # BLD AUTO: 1.9 K/UL (ref 1–4.8)
LYMPHOCYTES NFR BLD: 31.8 % (ref 22–41)
MCH RBC QN AUTO: 28.9 PG (ref 27–33)
MCHC RBC AUTO-ENTMCNC: 33.4 G/DL (ref 33.6–35)
MCV RBC AUTO: 86.5 FL (ref 81.4–97.8)
MONOCYTES # BLD AUTO: 0.66 K/UL (ref 0–0.85)
MONOCYTES NFR BLD AUTO: 11.1 % (ref 0–13.4)
NEUTROPHILS # BLD AUTO: 3.22 K/UL (ref 2–7.15)
NEUTROPHILS NFR BLD: 54 % (ref 44–72)
NRBC # BLD AUTO: 0 K/UL
NRBC BLD-RTO: 0 /100 WBC
PLATELET # BLD AUTO: 204 K/UL (ref 164–446)
PMV BLD AUTO: 10.6 FL (ref 9–12.9)
POTASSIUM SERPL-SCNC: 3.4 MMOL/L (ref 3.6–5.5)
PROT SERPL-MCNC: 6.9 G/DL (ref 6–8.2)
RBC # BLD AUTO: 4.53 M/UL (ref 4.2–5.4)
SODIUM SERPL-SCNC: 138 MMOL/L (ref 135–145)
WBC # BLD AUTO: 6 K/UL (ref 4.8–10.8)

## 2021-08-04 PROCEDURE — 85025 COMPLETE CBC W/AUTO DIFF WBC: CPT

## 2021-08-04 PROCEDURE — 36415 COLL VENOUS BLD VENIPUNCTURE: CPT

## 2021-08-04 PROCEDURE — 80053 COMPREHEN METABOLIC PANEL: CPT

## 2021-08-04 PROCEDURE — 99214 OFFICE O/P EST MOD 30 MIN: CPT | Performed by: INTERNAL MEDICINE

## 2021-08-04 RX ORDER — OMEPRAZOLE 40 MG/1
40 CAPSULE, DELAYED RELEASE ORAL 2 TIMES DAILY
Qty: 120 CAPSULE | Refills: 0 | Status: SHIPPED | OUTPATIENT
Start: 2021-08-04 | End: 2021-10-07

## 2021-08-04 NOTE — PROGRESS NOTES
Established Patient    Sona presents today with the following:    CC: Diarrhea, black tarry stool    HPI:   Sona is a 65 y.o. female who came in for above.  PCP not available.    She is having diarrhea for the past 3 days, started with explosive diarrhea. dark tarry stools + which resolved after one day. She was also having epigastric pain and vomiting on second day.  After that, she continues to have diarrhea without blood for the last 2 days, three episodes today.  No recent antibiotic intake since 2020.    She is taking Pedialyte and light diet which she is tolerating currently.    She is on aspirin 81 mg chronically for A. fib since she cannot tolerate anticoagulation.  She has been taking more aspirin lately due to headache and stress related to her 's hospitalization.  She has also been taking Advil.    ROS:   As above    Patient Active Problem List    Diagnosis Date Noted   • Obesity (BMI 30-39.9) 12/11/2019   • Primary osteoarthritis involving multiple joints 03/24/2019   • IFG (impaired fasting glucose) 03/24/2019   • Anesthesia    • Dyslipidemia 03/11/2019   • Varicose veins of left lower extremity, unspecified whether complicated 03/11/2019   • Obesity (BMI 35.0-39.9 without comorbidity) ([facility]) 11/15/2017   • Nasal step visual field defect of both eyes 10/11/2017   • Family history of breast cancer 09/15/2017   • Breast nodule 09/15/2017   • Cataract    • Glaucoma    • Atrial fibrillation (HCC) 02/15/2013   • GERD (gastroesophageal reflux disease) 05/22/2012   • Allergic rhinitis 05/22/2012   • Hypertension 05/22/2012   • Preventative health care 11/09/2010       Current Outpatient Medications   Medication Sig Dispense Refill   • omeprazole (PRILOSEC) 40 MG delayed-release capsule Take 1 capsule by mouth 2 times a day. 120 capsule 0   • butalbital/apap/caffeine -40 mg (FIORICET) -40 MG Tab TAKE 1 TABLET BY MOUTH EVERY 4 HOURS AS NEEDED FOR  HEADACHE  FOR  180  DAYS 30 tablet 0  "  • omeprazole (PRILOSEC) 20 MG delayed-release capsule Take 1 capsule by mouth twice daily 180 capsule 3   • potassium chloride ER (KLOR-CON) 10 MEQ tablet Take 1 tablet by mouth every day. 90 tablet 3   • sertraline (ZOLOFT) 100 MG Tab Take 1 tablet by mouth once daily 90 tablet 3   • valsartan (DIOVAN) 80 MG Tab Take 1 tablet by mouth once daily 90 tablet 3   • chlorthalidone (HYGROTON) 25 MG Tab Take 1 tablet by mouth once daily 90 Tab 3   • cyanocobalamin (VITAMIN B-12) 100 MCG Tab Take 100 mcg by mouth every day.     • rosuvastatin (CRESTOR) 10 MG Tab Take 10 mg by mouth every evening. Taking 1 tab alt w/1/2 tab daily     • coenzyme Q-10 30 MG capsule Take 60 mg by mouth every day.     • colesevelam (WELCHOL) 625 MG Tab Take 1 Tab by mouth 2 times a day, with meals. 180 Tab 3   • latanoprost (XALATAN) 0.005 % Solution      • timolol gel-forming (TIMOPTIC-XR) 0.5 % GEL FORMING SOLUTION      • aspirin EC (ECOTRIN) 81 MG Tablet Delayed Response Take 162 mg by mouth every day.     • ascorbic acid (ASCORBIC ACID) 500 MG Tab Take 500 mg by mouth every day.     • calcium-vitamin D (CALCIUM 500 + D) 500-125 MG-UNIT Tab Take 1 Tab by mouth every day.     • Cholecalciferol (VITAMIN D) 2000 UNITS Cap Take 1 Cap by mouth every day. (Patient taking differently: Take 2,000 Units by mouth 2 Times a Day.) 30 Cap 0   • acetaminophen (TYLENOL) 325 MG Tab Take 2 Tabs by mouth every four hours as needed. 30 Tab 0   • travoprost (TRAVOPROST) 0.004 % SOLN Place 1 Drop in left eye every evening.       No current facility-administered medications for this visit.         /68   Pulse 60   Temp 36 °C (96.8 °F) (Temporal)   Ht 1.6 m (5' 3\")   Wt 91.6 kg (202 lb)   SpO2 95%   BMI 35.78 kg/m²     Physical Exam  General: Alert and oriented, No apparent distress.  Lungs: Clear to auscultation bilaterally without any wheezing, crepitations.  Cardiovascular: Regular rate and rhythm. No murmurs, rubs or gallops.  Abdomen: Bowel " sound +, soft, tenderness in epigastrium, no rebound or guarding, no palpable organomegaly  Extremities: No clubbing, cyanosis, edema.         Assessment and Plan    1. Acute diarrhea  Likely viral origin complicated by possible upper GI bleed from NSAID use.  - CULTURE STOOL; Future  - C Diff by PCR rflx Toxin; Future    2. Melena  - Comp Metabolic Panel; Future  - CBC WITH DIFFERENTIAL; Future  - OCCULT BLOOD FECES IMMUNOASSAY; Future  - REFERRAL TO GASTROENTEROLOGY  -She is currently on omeprazole 20 mg twice daily to control her GERD with hiatal hernia.  She has been missing some doses due to her 's hospitalization.  - Increase omeprazole (PRILOSEC) 40 MG delayed-release capsule; Take 1 capsule by mouth 2 times a day.  6-8 weeks for now.  -ER precautions with recurrence of melena.      Return if symptoms worsen or fail to improve.      Signed by: Lynne Smith M.D.

## 2021-08-06 ENCOUNTER — HOSPITAL ENCOUNTER (OUTPATIENT)
Facility: MEDICAL CENTER | Age: 66
End: 2021-08-06
Attending: INTERNAL MEDICINE
Payer: MEDICARE

## 2021-08-06 DIAGNOSIS — R19.7 ACUTE DIARRHEA: ICD-10-CM

## 2021-08-06 LAB
C DIFF DNA SPEC QL NAA+PROBE: NEGATIVE
C DIFF TOX GENS STL QL NAA+PROBE: NEGATIVE

## 2021-08-06 PROCEDURE — 82274 ASSAY TEST FOR BLOOD FECAL: CPT

## 2021-08-06 PROCEDURE — 87899 AGENT NOS ASSAY W/OPTIC: CPT

## 2021-08-06 PROCEDURE — 87493 C DIFF AMPLIFIED PROBE: CPT

## 2021-08-06 PROCEDURE — 87045 FECES CULTURE AEROBIC BACT: CPT

## 2021-08-07 LAB
E COLI SXT1+2 STL IA: NORMAL
SIGNIFICANT IND 70042: NORMAL
SITE SITE: NORMAL
SOURCE SOURCE: NORMAL

## 2021-08-08 LAB
BACTERIA STL CULT: ABNORMAL
C JEJUNI+C COLI AG STL QL: ABNORMAL
E COLI SXT1+2 STL IA: ABNORMAL
SIGNIFICANT IND 70042: ABNORMAL
SITE SITE: ABNORMAL
SOURCE SOURCE: ABNORMAL

## 2021-08-09 ENCOUNTER — TELEPHONE (OUTPATIENT)
Dept: MEDICAL GROUP | Facility: MEDICAL CENTER | Age: 66
End: 2021-08-09

## 2021-08-10 DIAGNOSIS — K92.1 MELENA: ICD-10-CM

## 2021-08-13 LAB — IMM ASSAY OCC BLD FITOB: POSITIVE

## 2021-10-07 ENCOUNTER — OFFICE VISIT (OUTPATIENT)
Dept: MEDICAL GROUP | Facility: MEDICAL CENTER | Age: 66
End: 2021-10-07
Payer: MEDICARE

## 2021-10-07 VITALS
BODY MASS INDEX: 35.97 KG/M2 | DIASTOLIC BLOOD PRESSURE: 80 MMHG | OXYGEN SATURATION: 96 % | HEIGHT: 63 IN | TEMPERATURE: 97.2 F | HEART RATE: 66 BPM | WEIGHT: 203 LBS | SYSTOLIC BLOOD PRESSURE: 118 MMHG

## 2021-10-07 DIAGNOSIS — E78.5 HYPERLIPIDEMIA LDL GOAL <70: ICD-10-CM

## 2021-10-07 DIAGNOSIS — I10 ESSENTIAL HYPERTENSION: ICD-10-CM

## 2021-10-07 DIAGNOSIS — G43.909 MIGRAINE SYNDROME: ICD-10-CM

## 2021-10-07 DIAGNOSIS — K21.9 GASTROESOPHAGEAL REFLUX DISEASE WITHOUT ESOPHAGITIS: ICD-10-CM

## 2021-10-07 DIAGNOSIS — F43.9 STRESS: ICD-10-CM

## 2021-10-07 DIAGNOSIS — E87.6 HYPOKALEMIA: ICD-10-CM

## 2021-10-07 PROCEDURE — 99214 OFFICE O/P EST MOD 30 MIN: CPT | Performed by: NURSE PRACTITIONER

## 2021-10-07 RX ORDER — OMEPRAZOLE 20 MG/1
CAPSULE, DELAYED RELEASE ORAL
Qty: 180 CAPSULE | Refills: 1 | Status: SHIPPED | OUTPATIENT
Start: 2021-10-07

## 2021-10-07 RX ORDER — VALSARTAN 80 MG/1
80 TABLET ORAL DAILY
Qty: 90 TABLET | Refills: 1 | Status: SHIPPED | OUTPATIENT
Start: 2021-10-07

## 2021-10-07 RX ORDER — SERTRALINE HYDROCHLORIDE 100 MG/1
100 TABLET, FILM COATED ORAL DAILY
Qty: 90 TABLET | Refills: 1 | Status: SHIPPED | OUTPATIENT
Start: 2021-10-07

## 2021-10-07 RX ORDER — POTASSIUM CHLORIDE 750 MG/1
10 TABLET, FILM COATED, EXTENDED RELEASE ORAL DAILY
Qty: 90 TABLET | Refills: 1 | Status: SHIPPED | OUTPATIENT
Start: 2021-10-07

## 2021-10-07 RX ORDER — COLESEVELAM 180 1/1
625 TABLET ORAL 2 TIMES DAILY WITH MEALS
Qty: 180 TABLET | Refills: 1 | Status: SHIPPED | OUTPATIENT
Start: 2021-10-07

## 2021-10-07 RX ORDER — BUTALBITAL, ACETAMINOPHEN AND CAFFEINE 50; 325; 40 MG/1; MG/1; MG/1
TABLET ORAL
Qty: 30 TABLET | Refills: 0 | Status: SHIPPED | OUTPATIENT
Start: 2021-10-11 | End: 2022-01-07

## 2021-10-07 RX ORDER — CHLORTHALIDONE 25 MG/1
TABLET ORAL
Qty: 90 TABLET | Refills: 1 | Status: SHIPPED | OUTPATIENT
Start: 2021-10-07

## 2021-10-07 ASSESSMENT — FIBROSIS 4 INDEX: FIB4 SCORE: 1.76

## 2021-10-07 NOTE — PROGRESS NOTES
Subjective:     Sona Thakur is a 65 y.o. female who presents with HTN.    HPI:   Seen in f/u for HTN.  Her   in august.  She is moving to [place].  She has already bought a house out of [place].  She is moving next wed.  Needs refills on all her meds.  She is on omeprazole for GERD.  Was up to 40 mg daily but now decreased with good controll on 20 mg.  Needs refill  She is on zoloft for stress d/t husbands illness.  She feels that after she is settled down from her move she will try and wean off.  Needs refill.  She is on chlorthaliadone, k+, valsartan for HTN.  Bp is stable and well controlled.  Needs refill on all meds  She is on occas fiorcet for migraines.  Has had more migraines recently d/t stress of moving and husbands death.    She is on welchol and crestor for her chol.  Stable on meds.  Needs refill on welchol only.  [facility]  Has some back pain d/t the statin but its tolerable.     Patient Active Problem List    Diagnosis Date Noted   • Obesity (BMI 30-39.9) 2019   • Primary osteoarthritis involving multiple joints 2019   • IFG (impaired fasting glucose) 2019   • Anesthesia    • Dyslipidemia 2019   • Varicose veins of left lower extremity, unspecified whether complicated 2019   • Obesity (BMI 35.0-39.9 without comorbidity) ([facility]) 11/15/2017   • Nasal step visual field defect of both eyes 10/11/2017   • Family history of breast cancer 09/15/2017   • Breast nodule 09/15/2017   • Cataract    • Glaucoma    • Atrial fibrillation (HCC) 02/15/2013   • GERD (gastroesophageal reflux disease) 2012   • Allergic rhinitis 2012   • Hypertension 2012   • Preventative health care 2010       Current medicines (including changes today)  Current Outpatient Medications   Medication Sig Dispense Refill   • [START ON 10/11/2021] butalbital/apap/caffeine -40 mg (FIORICET) -40 MG Tab TAKE 1 TABLET BY MOUTH EVERY 4 HOURS AS NEEDED FOR   "HEADACHE  FOR  180  DAYS 30 Tablet 0   • omeprazole (PRILOSEC) 20 MG delayed-release capsule Take 1 capsule by mouth twice daily 180 Capsule 1   • potassium chloride ER (KLOR-CON) 10 MEQ tablet Take 1 Tablet by mouth every day. 90 Tablet 1   • sertraline (ZOLOFT) 100 MG Tab Take 1 Tablet by mouth every day. 90 Tablet 1   • valsartan (DIOVAN) 80 MG Tab Take 1 Tablet by mouth every day. 90 Tablet 1   • chlorthalidone (HYGROTON) 25 MG Tab Take 1 tablet by mouth once daily 90 Tablet 1   • colesevelam (WELCHOL) 625 MG Tab Take 1 Tablet by mouth 2 times a day with meals. 180 Tablet 1   • cyanocobalamin (VITAMIN B-12) 100 MCG Tab Take 100 mcg by mouth every day.     • rosuvastatin (CRESTOR) 10 MG Tab Take 10 mg by mouth every evening. Taking 1 tab alt w/1/2 tab daily     • coenzyme Q-10 30 MG capsule Take 60 mg by mouth every day.     • latanoprost (XALATAN) 0.005 % Solution      • timolol gel-forming (TIMOPTIC-XR) 0.5 % GEL FORMING SOLUTION      • aspirin EC (ECOTRIN) 81 MG Tablet Delayed Response Take 162 mg by mouth every day.     • ascorbic acid (ASCORBIC ACID) 500 MG Tab Take 500 mg by mouth every day.     • calcium-vitamin D (CALCIUM 500 + D) 500-125 MG-UNIT Tab Take 1 Tab by mouth every day.     • Cholecalciferol (VITAMIN D) 2000 UNITS Cap Take 1 Cap by mouth every day. (Patient taking differently: Take 2,000 Units by mouth 2 Times a Day.) 30 Cap 0   • acetaminophen (TYLENOL) 325 MG Tab Take 2 Tabs by mouth every four hours as needed. 30 Tab 0   • travoprost (TRAVOPROST) 0.004 % SOLN Place 1 Drop in left eye every evening.       No current facility-administered medications for this visit.       Allergies   Allergen Reactions   • Allegra      \"Gave me a bad headache   • Lipitor [Atorvastatin Calcium]      \"Hurts my bones\"   • Pravastatin      Back, legs and hip pain   • Voltaren [Diclofenac]        ROS  Constitutional: Negative. Negative for fever, chills, weight loss, malaise/fatigue and diaphoresis.   HENT: " "Negative. Negative for hearing loss, ear pain, nosebleeds, congestion, sore throat, neck pain, tinnitus and ear discharge.   Respiratory: Negative. Negative for cough, hemoptysis, sputum production, shortness of breath, wheezing and stridor.   Cardiovascular: Negative. Negative for chest pain, palpitations, orthopnea, claudication, leg swelling and PND.   Gastrointestinal: Denies nausea, vomiting, diarrhea, constipation, heartburn, melena or hematochezia.  Genitourinary: Denies dysuria, hematuria, urinary incontinence, frequency or urgency.        Objective:     /80 (BP Location: Right arm, Patient Position: Sitting)   Pulse 66   Temp 36.2 °C (97.2 °F) (Temporal)   Ht 1.6 m (5' 3\")   Wt 92.1 kg (203 lb)   SpO2 96%  Body mass index is 35.96 kg/m².    Physical Exam:  Vitals reviewed.  Constitutional: Oriented to person, place, and time. appears well-developed and well-nourished. No distress.   Cardiovascular: Normal rate, regular rhythm, normal heart sounds and intact distal pulses. Exam reveals no gallop and no friction rub. No murmur heard. No carotid bruits.   Pulmonary/Chest: Effort normal and breath sounds normal. No stridor. No respiratory distress. no wheezes or rales. exhibits no tenderness.   Musculoskeletal: Normal range of motion. exhibits no edema. taran pedal pulses 2+.  Lymphadenopathy: No cervical or supraclavicular adenopathy.   Neurological: Alert and oriented to person, place, and time. exhibits normal muscle tone.  Skin: Skin is warm and dry. No diaphoresis.   Psychiatric: Normal mood and affect. Behavior is normal.      Assessment and Plan:     The following treatment plan was discussed:    1. Essential hypertension  valsartan (DIOVAN) 80 MG Tab    chlorthalidone (HYGROTON) 25 MG Tab    bp controlled and stable on meds.  refilled meds.  gave 6 mo supply of all meds for move.  she will [facility] with [facility] [place] and [facility]   2. Migraine syndrome  butalbital/apap/caffeine -40 mg " (FIORICET) -40 MG Tab    refilled fiorcet 30 tabs for 3 mo.  stable on med.  she may  on 10/11/21 d/t moving.   3. Gastroesophageal reflux disease without esophagitis  omeprazole (PRILOSEC) 20 MG delayed-release capsule    omeprazole 20 mg refilled. stable on med.  she is moving to [place] next wk.      4. Stress  sertraline (ZOLOFT) 100 MG Tab    stable on zoloft.  refilled med.  will start weaning off after stable from move   5. Hypokalemia  potassium chloride ER (KLOR-CON) 10 MEQ tablet    stable on k+.  refilled med.     6. Hyperlipidemia LDL goal <70  colesevelam (WELCHOL) 625 MG Tab    stable on welchol and crestor.  refill welchol.  [facility]  has tolerable s/e to crestor         Followup: Return if symptoms worsen or fail to improve.

## 2022-11-07 ENCOUNTER — PATIENT MESSAGE (OUTPATIENT)
Dept: HEALTH INFORMATION MANAGEMENT | Facility: OTHER | Age: 67
End: 2022-11-07

## 2023-04-19 NOTE — TELEPHONE ENCOUNTER
Received request via: Pharmacy    Was the patient seen in the last year in this department? Yes    Does the patient have an active prescription (recently filled or refills available) for medication(s) requested? No   Propranolol Pregnancy And Lactation Text: This medication is Pregnancy Category C and it isn't known if it is safe during pregnancy. It is excreted in breast milk.

## 2024-01-26 ENCOUNTER — APPOINTMENT (RX ONLY)
Dept: URBAN - METROPOLITAN AREA CLINIC 158 | Facility: CLINIC | Age: 69
Setting detail: DERMATOLOGY
End: 2024-01-26

## 2024-01-26 DIAGNOSIS — L57.0 ACTINIC KERATOSIS: ICD-10-CM | Status: INADEQUATELY CONTROLLED

## 2024-01-26 DIAGNOSIS — L82.1 OTHER SEBORRHEIC KERATOSIS: ICD-10-CM

## 2024-01-26 DIAGNOSIS — D18.0 HEMANGIOMA: ICD-10-CM

## 2024-01-26 DIAGNOSIS — D22 MELANOCYTIC NEVI: ICD-10-CM

## 2024-01-26 DIAGNOSIS — Z71.89 OTHER SPECIFIED COUNSELING: ICD-10-CM

## 2024-01-26 PROBLEM — D18.01 HEMANGIOMA OF SKIN AND SUBCUTANEOUS TISSUE: Status: ACTIVE | Noted: 2024-01-26

## 2024-01-26 PROBLEM — D22.61 MELANOCYTIC NEVI OF RIGHT UPPER LIMB, INCLUDING SHOULDER: Status: ACTIVE | Noted: 2024-01-26

## 2024-01-26 PROCEDURE — ? COUNSELING

## 2024-01-26 PROCEDURE — ? SUNSCREEN RECOMMENDATIONS

## 2024-01-26 PROCEDURE — 99203 OFFICE O/P NEW LOW 30 MIN: CPT | Mod: 25

## 2024-01-26 PROCEDURE — 17003 DESTRUCT PREMALG LES 2-14: CPT

## 2024-01-26 PROCEDURE — 17000 DESTRUCT PREMALG LESION: CPT

## 2024-01-26 PROCEDURE — ? LIQUID NITROGEN

## 2024-01-26 ASSESSMENT — LOCATION ZONE DERM
LOCATION ZONE: FACE
LOCATION ZONE: NOSE
LOCATION ZONE: HAND
LOCATION ZONE: ARM
LOCATION ZONE: TRUNK

## 2024-01-26 ASSESSMENT — LOCATION SIMPLE DESCRIPTION DERM
LOCATION SIMPLE: RIGHT FOREARM
LOCATION SIMPLE: RIGHT UPPER BACK
LOCATION SIMPLE: ABDOMEN
LOCATION SIMPLE: RIGHT HAND
LOCATION SIMPLE: LEFT LOWER BACK
LOCATION SIMPLE: LEFT CHEEK
LOCATION SIMPLE: RIGHT UPPER ARM
LOCATION SIMPLE: NOSE
LOCATION SIMPLE: LEFT NOSE
LOCATION SIMPLE: LEFT UPPER BACK
LOCATION SIMPLE: RIGHT ELBOW

## 2024-01-26 ASSESSMENT — LOCATION DETAILED DESCRIPTION DERM
LOCATION DETAILED: NASAL DORSUM
LOCATION DETAILED: LEFT RIB CAGE
LOCATION DETAILED: LEFT SUPERIOR LATERAL MIDBACK
LOCATION DETAILED: RIGHT ELBOW
LOCATION DETAILED: RIGHT SUPERIOR MEDIAL UPPER BACK
LOCATION DETAILED: RIGHT DISTAL DORSAL FOREARM
LOCATION DETAILED: RIGHT RADIAL DORSAL HAND
LOCATION DETAILED: LEFT MID-UPPER BACK
LOCATION DETAILED: LEFT CENTRAL MALAR CHEEK
LOCATION DETAILED: EPIGASTRIC SKIN
LOCATION DETAILED: LEFT NASAL SIDEWALL
LOCATION DETAILED: RIGHT ANTERIOR DISTAL UPPER ARM

## 2024-01-26 NOTE — PROCEDURE: LIQUID NITROGEN
Post-Care Instructions: I reviewed with the patient in detail post-care instructions. Patient is to wear sunprotection, and avoid picking at any of the treated lesions. Pt may apply Vaseline to crusted or scabbing areas.
Render Post-Care Instructions In Note?: yes
Consent: The patient's consent was obtained including but not limited to risks of crusting, scabbing, blistering, scarring, darker or lighter pigmentary change, recurrence, incomplete removal and infection.
Detail Level: Detailed
Number Of Freeze-Thaw Cycles: 1 freeze-thaw cycle
Render Note In Bullet Format When Appropriate: No
Duration Of Freeze Thaw-Cycle (Seconds): 6

## (undated) DEVICE — Device

## (undated) DEVICE — GLOVES, #7 1/2 BIOGEL M

## (undated) DEVICE — GVL 4 STAT DISPOSABLE - (10/BX)

## (undated) DEVICE — LACTATED RINGERS INJ. 500 ML - (24EA/CA)

## (undated) DEVICE — BOVIE NEEDLE TIP 3CM COLORADO

## (undated) DEVICE — CANNULA ANTERIOR CHAMBER 27G

## (undated) DEVICE — WATER IRRIGATION STERILE 1000ML (12EA/CA)

## (undated) DEVICE — CATHETER IV 20 GA X 1-1/4 ---SURG.& SDS ONLY--- (50EA/BX)

## (undated) DEVICE — MASK ANESTHESIA ADULT  - (100/CA)

## (undated) DEVICE — TUBING IV NEEDLESS PRE-OP - (50/CA)

## (undated) DEVICE — SET LEADWIRE 5 LEAD BEDSIDE DISPOSABLE ECG (1SET OF 5/EA)

## (undated) DEVICE — SHIELD OPTH AL GRTR CVR FOX (50EA/BX)

## (undated) DEVICE — SUCTION INSTRUMENT YANKAUER BULBOUS TIP W/O VENT (50EA/CA)

## (undated) DEVICE — PACK BASIC CATARACT - (4/BX)

## (undated) DEVICE — GLOVE BIOGEL M SZ 8 SURGICAL PF LTX - (50/BX 4BX/CA)

## (undated) DEVICE — PEN SKIN MARKER W/RULER - (50EA/BX)

## (undated) DEVICE — KIT  I.V. START (100EA/CA)

## (undated) DEVICE — PENCIL ELECTSURG 10FT BTN SWH - (50/CA)

## (undated) DEVICE — CANNULA DIVIDED ADULT CO2 - SAMPLE W/FEMALE CONNCT (25/CA)

## (undated) DEVICE — NEPTUNE 4 PORT MANIFOLD - (20/PK)

## (undated) DEVICE — PROTECTOR ULNA NERVE - (36PR/CA)

## (undated) DEVICE — LACTATED RINGERS INJ 1000 ML - (14EA/CA 60CA/PF)

## (undated) DEVICE — TUBE CONNECTING SUCTION - CLEAR PLASTIC STERILE 72 IN (50EA/CA)

## (undated) DEVICE — ELECTRODE 850 FOAM ADHESIVE - HYDROGEL RADIOTRNSPRNT (50/PK)

## (undated) DEVICE — DRAPE STRLE REG TOWEL 18X24 - (10/BX 4BX/CA)"

## (undated) DEVICE — GLOVE BIOGEL SZ 8 SURGICAL PF LTX - (50PR/BX 4BX/CA)

## (undated) DEVICE — PACK ENT OR - (2EA/CA)

## (undated) DEVICE — GOWN WARMING STANDARD FLEX - (30/CA)

## (undated) DEVICE — BOVIE NEEDLE TIP INSULATD NON-SAFETY 2CM (50/PK)

## (undated) DEVICE — SYRINGE SAFETY 10 ML 18 GA X 1 1/2 BLUNT LL (100/BX 4BX/CA)

## (undated) DEVICE — NEEDLE FILTER ASPIRATION 18 GA X 1 1/2 IN (100EA/BX)

## (undated) DEVICE — CANISTER SUCTION RIGID RED 1500CC (40EA/CA)

## (undated) DEVICE — CANISTER SUCTION 3000ML MECHANICAL FILTER AUTO SHUTOFF MEDI-VAC NONSTERILE LF DISP  (40EA/CA)

## (undated) DEVICE — NEEDLE NON SAFETY HYPO 22 GA X 1 1/2 IN (100/BX)

## (undated) DEVICE — ANTI-FOG SOLUTION - 60BTL/CA

## (undated) DEVICE — SYRINGE 3 CC 21 GA X 1-1/2 - NDL SAFETY (50/BX 8BX/CA)

## (undated) DEVICE — SENSOR SPO2 NEO LNCS ADHESIVE (20/BX) SEE USER NOTES

## (undated) DEVICE — CON SEDATION/>5 YR 1ST 15 MIN

## (undated) DEVICE — ELECTRODE DUAL RETURN W/ CORD - (50/PK)

## (undated) DEVICE — CYSTOTOME 27G

## (undated) DEVICE — SUTURE GENERAL

## (undated) DEVICE — TIP MINI FLARE 30 DEGREE OZIL - (6/BX)

## (undated) DEVICE — HEAD HOLDER JUNIOR/ADULT

## (undated) DEVICE — TUBING CLEARLINK DUO-VENT - C-FLO (48EA/CA)

## (undated) DEVICE — SODIUM CHL IRRIGATION 0.9% 1000ML (12EA/CA)

## (undated) DEVICE — KIT ANESTHESIA W/CIRCUIT & 3/LT BAG W/FILTER (20EA/CA)

## (undated) DEVICE — SUTURE 7-0 PROLENE BV-1 D/A 24 (36PK/BX)"

## (undated) DEVICE — NEEDLE HYPO NON-SAFETY 18 GA X 1 IN (100/BX)

## (undated) DEVICE — NEEDLE  NON-SAFETY 30GA X 3/4 IN (10EA/CA)

## (undated) DEVICE — CON SEDATION EA ADDL 15 MIN

## (undated) DEVICE — CUFF SOFT ADLT 12W/RECTUS - CONNECTER (20/CA)

## (undated) DEVICE — CORDS BIPOLAR COAGULATION - 12FT STERILE DISP. (10EA/BX)

## (undated) DEVICE — SET EXTENSION WITH 2 PORTS (48EA/CA) ***PART #2C8610 IS A SUBSTITUTE*****

## (undated) DEVICE — SYRINGE 1 ML LL POLYCARBONATE LUER LOCK (100EA/BX 8BX/CA)

## (undated) DEVICE — GVL 3 STAT DISPOSABLE - (10/BX)

## (undated) DEVICE — APPLICATOR COTTONTIP 3 IN - STERILE (10EA/PK 100PK/CA)